# Patient Record
Sex: MALE | Race: WHITE | NOT HISPANIC OR LATINO | Employment: FULL TIME | URBAN - METROPOLITAN AREA
[De-identification: names, ages, dates, MRNs, and addresses within clinical notes are randomized per-mention and may not be internally consistent; named-entity substitution may affect disease eponyms.]

---

## 2017-01-01 ENCOUNTER — HOSPITAL ENCOUNTER (EMERGENCY)
Facility: HOSPITAL | Age: 57
Discharge: HOME | End: 2017-01-01
Attending: EMERGENCY MEDICINE
Payer: COMMERCIAL

## 2017-01-01 ENCOUNTER — GENERIC CONVERSION - ENCOUNTER (OUTPATIENT)
Dept: OTHER | Facility: OTHER | Age: 57
End: 2017-01-01

## 2017-01-01 VITALS
WEIGHT: 290 LBS | OXYGEN SATURATION: 98 % | HEIGHT: 72 IN | RESPIRATION RATE: 18 BRPM | BODY MASS INDEX: 39.28 KG/M2 | TEMPERATURE: 96.4 F | HEART RATE: 99 BPM | SYSTOLIC BLOOD PRESSURE: 140 MMHG | DIASTOLIC BLOOD PRESSURE: 71 MMHG

## 2017-01-01 DIAGNOSIS — R33.8 ACUTE URINARY RETENTION: Primary | ICD-10-CM

## 2017-01-01 LAB
AMORPH URATE CRY URNS QL MICRO: ABNORMAL /HPF
ANION GAP SERPL CALCULATED.3IONS-SCNC: 10 MMOL/L (ref 4–13)
BACTERIA UR QL AUTO: ABNORMAL /HPF
BILIRUB UR QL STRIP: NEGATIVE
BUN SERPL-MCNC: 22 MG/DL (ref 5–25)
CALCIUM SERPL-MCNC: 9.3 MG/DL (ref 8.3–10.1)
CHLORIDE SERPL-SCNC: 105 MMOL/L (ref 100–108)
CLARITY UR: CLEAR
CLARITY, POC: CLEAR
CO2 SERPL-SCNC: 24 MMOL/L (ref 21–32)
COLOR UR: YELLOW
COLOR, POC: YELLOW
CREAT SERPL-MCNC: 0.96 MG/DL (ref 0.6–1.3)
GFR SERPL CREATININE-BSD FRML MDRD: >60 ML/MIN/1.73SQ M
GLUCOSE SERPL-MCNC: 140 MG/DL (ref 65–140)
GLUCOSE UR STRIP-MCNC: NEGATIVE MG/DL
HGB UR QL STRIP.AUTO: ABNORMAL
KETONES UR STRIP-MCNC: NEGATIVE MG/DL
LEUKOCYTE ESTERASE UR QL STRIP: NEGATIVE
NITRITE UR QL STRIP: NEGATIVE
NON-SQ EPI CELLS URNS QL MICRO: ABNORMAL /HPF
PH UR STRIP.AUTO: 5.5 [PH] (ref 4.5–8)
POTASSIUM SERPL-SCNC: 3.8 MMOL/L (ref 3.5–5.3)
PROT UR STRIP-MCNC: NEGATIVE MG/DL
RBC #/AREA URNS AUTO: ABNORMAL /HPF
SODIUM SERPL-SCNC: 139 MMOL/L (ref 136–145)
SP GR UR STRIP.AUTO: 1.01 (ref 1–1.03)
UROBILINOGEN UR QL STRIP.AUTO: 0.2 E.U./DL
WBC #/AREA URNS AUTO: ABNORMAL /HPF

## 2017-01-01 PROCEDURE — 87086 URINE CULTURE/COLONY COUNT: CPT

## 2017-01-01 PROCEDURE — 99284 EMERGENCY DEPT VISIT MOD MDM: CPT

## 2017-01-01 PROCEDURE — 81002 URINALYSIS NONAUTO W/O SCOPE: CPT | Performed by: EMERGENCY MEDICINE

## 2017-01-01 PROCEDURE — 80048 BASIC METABOLIC PNL TOTAL CA: CPT | Performed by: EMERGENCY MEDICINE

## 2017-01-01 PROCEDURE — 81001 URINALYSIS AUTO W/SCOPE: CPT

## 2017-01-01 PROCEDURE — 36415 COLL VENOUS BLD VENIPUNCTURE: CPT | Performed by: EMERGENCY MEDICINE

## 2017-01-01 RX ORDER — LISINOPRIL 40 MG/1
40 TABLET ORAL DAILY
COMMUNITY

## 2017-01-01 RX ORDER — ALBUTEROL SULFATE 90 UG/1
2 AEROSOL, METERED RESPIRATORY (INHALATION) EVERY 6 HOURS PRN
COMMUNITY

## 2017-01-01 RX ORDER — AMOXICILLIN 500 MG
1 CAPSULE ORAL 2 TIMES DAILY
COMMUNITY

## 2017-01-01 RX ORDER — DIPHENOXYLATE HYDROCHLORIDE AND ATROPINE SULFATE 2.5; .025 MG/1; MG/1
1 TABLET ORAL DAILY
COMMUNITY

## 2017-01-01 RX ORDER — MELOXICAM 7.5 MG/1
7.5 TABLET ORAL DAILY
COMMUNITY
End: 2021-03-22

## 2017-01-01 RX ORDER — RANITIDINE 150 MG/1
150 TABLET ORAL 2 TIMES DAILY
COMMUNITY

## 2017-01-01 RX ORDER — GABAPENTIN 300 MG/1
300 CAPSULE ORAL 2 TIMES DAILY
COMMUNITY

## 2017-01-01 RX ORDER — LIDOCAINE HYDROCHLORIDE 20 MG/ML
JELLY TOPICAL ONCE
Status: COMPLETED | OUTPATIENT
Start: 2017-01-01 | End: 2017-01-01

## 2017-01-01 RX ORDER — TRAMADOL HYDROCHLORIDE 50 MG/1
50 TABLET ORAL EVERY 6 HOURS PRN
COMMUNITY
End: 2021-03-22

## 2017-01-01 RX ADMIN — LIDOCAINE HYDROCHLORIDE: 20 JELLY TOPICAL at 17:33

## 2017-01-02 LAB — BACTERIA UR CULT: NORMAL

## 2017-01-09 ENCOUNTER — ALLSCRIPTS OFFICE VISIT (OUTPATIENT)
Dept: OTHER | Facility: OTHER | Age: 57
End: 2017-01-09

## 2017-01-09 ENCOUNTER — TRANSCRIBE ORDERS (OUTPATIENT)
Dept: ADMINISTRATIVE | Facility: HOSPITAL | Age: 57
End: 2017-01-09

## 2017-01-09 DIAGNOSIS — R31.0 GROSS HEMATURIA: Primary | ICD-10-CM

## 2017-01-09 DIAGNOSIS — R97.20 ELEVATED PROSTATE SPECIFIC ANTIGEN (PSA): ICD-10-CM

## 2017-01-09 DIAGNOSIS — R31.0 GROSS HEMATURIA: ICD-10-CM

## 2017-01-16 ENCOUNTER — HOSPITAL ENCOUNTER (OUTPATIENT)
Dept: CT IMAGING | Facility: HOSPITAL | Age: 57
Discharge: HOME | End: 2017-01-16
Attending: UROLOGY
Payer: COMMERCIAL

## 2017-01-16 DIAGNOSIS — R97.20 ELEVATED PROSTATE SPECIFIC ANTIGEN (PSA): ICD-10-CM

## 2017-01-16 DIAGNOSIS — R31.0 GROSS HEMATURIA: ICD-10-CM

## 2017-01-16 PROCEDURE — 74178 CT ABD&PLV WO CNTR FLWD CNTR: CPT

## 2017-01-16 RX ADMIN — IOHEXOL 100 ML: 350 INJECTION, SOLUTION INTRAVENOUS at 16:00

## 2017-02-20 ENCOUNTER — ALLSCRIPTS OFFICE VISIT (OUTPATIENT)
Dept: OTHER | Facility: OTHER | Age: 57
End: 2017-02-20

## 2017-03-10 ENCOUNTER — ALLSCRIPTS OFFICE VISIT (OUTPATIENT)
Dept: OTHER | Facility: OTHER | Age: 57
End: 2017-03-10

## 2017-09-07 DIAGNOSIS — R97.20 ELEVATED PROSTATE SPECIFIC ANTIGEN (PSA): ICD-10-CM

## 2018-01-12 NOTE — PROCEDURES
Procedures  by Lorena Mcdowell PA-C at 1/1/2017  5:46 PM      Author: W Romayne Terryl Plumber, PA-C Service:  Urology Author Type:  Physician Assistant    Filed:  1/1/2017  5:49 PM Date of Service:  1/1/2017  5:46 PM Status:  Attested    : Lorena Mcdowell PA-C (Physician Assistant)  Cosigner:  Becky Fry MD at 1/2/2017  9:33 AM      Pre-procedure Diagnoses:       1  Urinary retention [R33 9]                Procedures:       1  BLADDER CATHETERIZATION [LSV278 (Custom)]              Attestation signed by Becky Fry MD at 1/2/2017  9:33 AM           I agree with the physician assistant as above  I received a call on the evening of January 1, 2017 from the emergency room at [facility]  The physician  stated that Mr Jaden Barba was in urinary retention and multiple attempts by the emergency room staff and physician were unsuccessful to place a Arriola catheter  I was informed that there was a urologist on call for the hospital but that he was unavailable  Therefore the emergency room at [facility] requested transfer to [facility] for urologic assistance with Arriola catheter placement  The on-call surgical physician assistant was able to easily place a Arriola catheter without difficulty  or resistance  The patient was discharged from the emergency room with an indwelling Arriola catheter in place will follow-up in the outpatient setting for a voiding trial                                            Pt transferred to ER from [facility]  Pt unable to urinate since 2 am  Doctors in ER there unable to place arriola  Using standard sterile technique and urojet lidocaine, placed 16F  coudet catheter without resistance  Urine returned, balloon inflated with 10 ml of sterile water  Pt tolerated procedure well  800 ml of urine returned with more still draining             Received [address]  Jan 2 2017 9:34AM Eastern Standard Time

## 2018-01-13 VITALS
WEIGHT: 292 LBS | RESPIRATION RATE: 16 BRPM | HEART RATE: 92 BPM | SYSTOLIC BLOOD PRESSURE: 150 MMHG | BODY MASS INDEX: 38.7 KG/M2 | DIASTOLIC BLOOD PRESSURE: 90 MMHG | HEIGHT: 73 IN

## 2018-01-14 VITALS
SYSTOLIC BLOOD PRESSURE: 130 MMHG | DIASTOLIC BLOOD PRESSURE: 80 MMHG | RESPIRATION RATE: 16 BRPM | BODY MASS INDEX: 40.16 KG/M2 | WEIGHT: 303 LBS | HEIGHT: 73 IN | HEART RATE: 100 BPM

## 2018-01-22 VITALS
BODY MASS INDEX: 40.64 KG/M2 | HEART RATE: 80 BPM | DIASTOLIC BLOOD PRESSURE: 82 MMHG | SYSTOLIC BLOOD PRESSURE: 140 MMHG | WEIGHT: 308 LBS

## 2018-02-26 ENCOUNTER — OFFICE VISIT (OUTPATIENT)
Dept: URGENT CARE | Facility: CLINIC | Age: 58
End: 2018-02-26
Payer: COMMERCIAL

## 2018-02-26 VITALS
DIASTOLIC BLOOD PRESSURE: 81 MMHG | OXYGEN SATURATION: 96 % | SYSTOLIC BLOOD PRESSURE: 165 MMHG | HEART RATE: 108 BPM | TEMPERATURE: 100.6 F

## 2018-02-26 DIAGNOSIS — J11.1 INFLUENZA: Primary | ICD-10-CM

## 2018-02-26 PROCEDURE — G0382 LEV 3 HOSP TYPE B ED VISIT: HCPCS | Performed by: NURSE PRACTITIONER

## 2018-02-26 RX ORDER — OSELTAMIVIR PHOSPHATE 75 MG/1
75 CAPSULE ORAL 2 TIMES DAILY
Qty: 10 CAPSULE | Refills: 0 | Status: SHIPPED | OUTPATIENT
Start: 2018-02-26 | End: 2018-03-03

## 2018-02-26 RX ORDER — FINASTERIDE 5 MG/1
5 TABLET, FILM COATED ORAL
COMMUNITY
End: 2018-04-09 | Stop reason: SDUPTHER

## 2018-02-26 RX ORDER — CYCLOBENZAPRINE HCL 10 MG
10 TABLET ORAL
COMMUNITY
End: 2021-03-22

## 2018-02-26 RX ORDER — TAMSULOSIN HYDROCHLORIDE 0.4 MG/1
0.4 CAPSULE ORAL
COMMUNITY
End: 2018-04-09 | Stop reason: SDUPTHER

## 2018-02-26 RX ORDER — BUDESONIDE AND FORMOTEROL FUMARATE DIHYDRATE 160; 4.5 UG/1; UG/1
2 AEROSOL RESPIRATORY (INHALATION)
COMMUNITY

## 2018-02-26 NOTE — PATIENT INSTRUCTIONS
Increase fluids and rest  OTC cough/cold medications  Discussed viral vs bacterial infection  Return if symptoms worsen or for problems/concerns  Tamiflu as directed

## 2018-02-26 NOTE — PROGRESS NOTES
[facility]        NAME: Bj Le is a 62 y o  male  : 1960    MRN: [id number]  DATE: 2018  TIME: 11:12 AM    Assessment and Plan   Influenza [J11 1]  1  Influenza  oseltamivir (TAMIFLU) 75 mg capsule         Patient Instructions     Patient Instructions   Increase fluids and rest  OTC cough/cold medications  Discussed viral vs bacterial infection  Return if symptoms worsen or for problems/concerns  Tamiflu as directed      Follow up with PCP in 3-5 days  Proceed to  ER if symptoms worsen  Chief Complaint     Chief Complaint   Patient presents with    Cold Like Symptoms     cough and achy since  with chills         History of Present Illness       Started with a scratchy throat on Saturday, last night started with bodyaches, fever, headache, sinus cognestion, and cough  Review of Systems   Review of Systems   Constitutional: Positive for activity change, chills, fatigue and fever  HENT: Positive for postnasal drip, rhinorrhea and sore throat  Negative for congestion, ear pain and sinus pressure  Eyes: Negative for pain, discharge and redness  Respiratory: Positive for cough  Negative for wheezing  Cardiovascular: Negative for chest pain  Gastrointestinal: Negative for constipation, diarrhea, nausea and vomiting  Musculoskeletal: Positive for myalgias  Skin: Negative for rash  Neurological: Positive for headaches  Negative for dizziness           Current Medications       Current Outpatient Prescriptions:     apixaban (ELIQUIS) 5 mg, 2 tablets twice a day until , then on  change to 1 tablet twice a day , Disp: , Rfl:     tamsulosin (FLOMAX) 0 4 mg, Take 0 4 mg by mouth daily with dinner, Disp: , Rfl:     albuterol (PROVENTIL HFA,VENTOLIN HFA) 90 mcg/act inhaler, Inhale 2 puffs every 6 (six) hours as needed for wheezing, Disp: , Rfl:     budesonide-formoterol (SYMBICORT) 160-4 5 mcg/act inhaler, Inhale 2 puffs, Disp: , Rfl:    Capsaicin-Menthol-Methyl Anil (Glo Browne) 0 025-10-30 % LOTN, Apply topically, Disp: , Rfl:     Cholecalciferol (VITAMIN D3 PO), Take 1 tablet by mouth daily, Disp: , Rfl:     cyclobenzaprine (FLEXERIL) 10 mg tablet, Take 10 mg by mouth, Disp: , Rfl:     finasteride (PROSCAR) 5 mg tablet, Take 5 mg by mouth, Disp: , Rfl:     gabapentin (NEURONTIN) 300 mg capsule, Take 300 mg by mouth 2 (two) times a day, Disp: , Rfl:     lisinopril (ZESTRIL) 40 mg tablet, Take 40 mg by mouth daily, Disp: , Rfl:     meloxicam (MOBIC) 7 5 mg tablet, Take 7 5 mg by mouth daily, Disp: , Rfl:     multivitamin (THERAGRAN) TABS, Take 1 tablet by mouth daily, Disp: , Rfl:     Omega-3 Fatty Acids (FISH OIL) 1200 MG CAPS, Take 1 capsule by mouth 2 (two) times a day, Disp: , Rfl:     oseltamivir (TAMIFLU) 75 mg capsule, Take 1 capsule (75 mg total) by mouth 2 (two) times a day for 5 days, Disp: 10 capsule, Rfl: 0    ranitidine (ZANTAC) 150 mg tablet, Take 150 mg by mouth 2 (two) times a day, Disp: , Rfl:     Red Yeast Rice Extract (RED YEAST RICE PO), Take by mouth, Disp: , Rfl:     traMADol (ULTRAM) 50 mg tablet, Take 50 mg by mouth every 6 (six) hours as needed for moderate pain, Disp: , Rfl:     Zolpidem Tartrate (AMBIEN PO), Take 10 mg by mouth daily at bedtime, Disp: , Rfl:     Current Allergies     Allergies as of 02/26/2018 - Reviewed 02/26/2018   Allergen Reaction Noted    Percocet [oxycodone-acetaminophen] Itching 01/01/2017            The following portions of the patient's history were reviewed and updated as appropriate: allergies, current medications, past family history, past medical history, past social history, past surgical history and problem list      Past Medical History:   Diagnosis Date    Asthma     Enlarged prostate     Hypertension     Lumbar herniated disc        Past Surgical History:   Procedure Laterality Date    LUNG BIOPSY      PROSTATE BIOPSY         No family history on file       Medications have been verified  Objective   /81   Pulse (!) 108   Temp (!) 100 6 °F (38 1 °C)   SpO2 96%        Physical Exam     Physical Exam   Constitutional: He is oriented to person, place, and time  He appears well-developed and well-nourished  He has a sickly appearance  No distress  HENT:   Head: Normocephalic and atraumatic  Right Ear: Tympanic membrane, external ear and ear canal normal    Left Ear: Tympanic membrane, external ear and ear canal normal    Nose: Rhinorrhea present  No epistaxis  Mouth/Throat: Uvula is midline, oropharynx is clear and moist and mucous membranes are normal    Cardiovascular: Regular rhythm and normal heart sounds  Tachycardia present  Exam reveals no gallop and no friction rub  No murmur heard  Pulmonary/Chest: Effort normal and breath sounds normal  No respiratory distress  He has no wheezes  He has no rales  Abdominal: He exhibits no distension  There is no tenderness  Lymphadenopathy:        Head (right side): No submandibular adenopathy present  Head (left side): No submandibular adenopathy present  Neurological: He is alert and oriented to person, place, and time  Skin: He is not diaphoretic  Psychiatric: He has a normal mood and affect  His behavior is normal  Judgment and thought content normal    Nursing note and vitals reviewed

## 2018-04-09 DIAGNOSIS — N40.1 BENIGN PROSTATIC HYPERPLASIA WITH LOWER URINARY TRACT SYMPTOMS, SYMPTOM DETAILS UNSPECIFIED: Primary | ICD-10-CM

## 2018-04-09 RX ORDER — TAMSULOSIN HYDROCHLORIDE 0.4 MG/1
0.4 CAPSULE ORAL
Qty: 30 CAPSULE | Refills: 1 | Status: SHIPPED | OUTPATIENT
Start: 2018-04-09 | End: 2018-07-04 | Stop reason: SDUPTHER

## 2018-04-09 RX ORDER — FINASTERIDE 5 MG/1
5 TABLET, FILM COATED ORAL DAILY
Qty: 30 TABLET | Refills: 1 | Status: SHIPPED | OUTPATIENT
Start: 2018-04-09 | End: 2018-07-08 | Stop reason: SDUPTHER

## 2018-07-04 DIAGNOSIS — N40.1 BENIGN PROSTATIC HYPERPLASIA WITH LOWER URINARY TRACT SYMPTOMS, SYMPTOM DETAILS UNSPECIFIED: ICD-10-CM

## 2018-07-05 RX ORDER — TAMSULOSIN HYDROCHLORIDE 0.4 MG/1
CAPSULE ORAL
Qty: 30 CAPSULE | Refills: 1 | Status: SHIPPED | OUTPATIENT
Start: 2018-07-05 | End: 2018-09-01 | Stop reason: SDUPTHER

## 2018-07-08 DIAGNOSIS — N40.1 BENIGN PROSTATIC HYPERPLASIA WITH LOWER URINARY TRACT SYMPTOMS, SYMPTOM DETAILS UNSPECIFIED: ICD-10-CM

## 2018-07-09 RX ORDER — FINASTERIDE 5 MG/1
TABLET, FILM COATED ORAL
Qty: 30 TABLET | Refills: 1 | Status: SHIPPED | OUTPATIENT
Start: 2018-07-09 | End: 2018-09-06 | Stop reason: SDUPTHER

## 2018-09-01 DIAGNOSIS — N40.1 BENIGN PROSTATIC HYPERPLASIA WITH LOWER URINARY TRACT SYMPTOMS, SYMPTOM DETAILS UNSPECIFIED: ICD-10-CM

## 2018-09-04 RX ORDER — TAMSULOSIN HYDROCHLORIDE 0.4 MG/1
CAPSULE ORAL
Qty: 30 CAPSULE | Refills: 1 | Status: SHIPPED | OUTPATIENT
Start: 2018-09-04 | End: 2018-10-16 | Stop reason: SDUPTHER

## 2018-09-06 DIAGNOSIS — N40.1 BENIGN PROSTATIC HYPERPLASIA WITH LOWER URINARY TRACT SYMPTOMS, SYMPTOM DETAILS UNSPECIFIED: ICD-10-CM

## 2018-09-06 RX ORDER — FINASTERIDE 5 MG/1
TABLET, FILM COATED ORAL
Qty: 30 TABLET | Refills: 1 | OUTPATIENT
Start: 2018-09-06

## 2018-09-06 RX ORDER — FINASTERIDE 5 MG/1
5 TABLET, FILM COATED ORAL DAILY
Qty: 30 TABLET | Refills: 0 | Status: SHIPPED | OUTPATIENT
Start: 2018-09-06 | End: 2018-10-16 | Stop reason: SDUPTHER

## 2018-09-21 LAB — HBA1C MFR BLD HPLC: 5.6 %

## 2018-10-16 ENCOUNTER — OFFICE VISIT (OUTPATIENT)
Dept: UROLOGY | Facility: CLINIC | Age: 58
End: 2018-10-16
Payer: COMMERCIAL

## 2018-10-16 VITALS
BODY MASS INDEX: 41.3 KG/M2 | WEIGHT: 313 LBS | DIASTOLIC BLOOD PRESSURE: 80 MMHG | HEART RATE: 68 BPM | SYSTOLIC BLOOD PRESSURE: 120 MMHG

## 2018-10-16 DIAGNOSIS — N40.1 BENIGN PROSTATIC HYPERPLASIA WITH LOWER URINARY TRACT SYMPTOMS, SYMPTOM DETAILS UNSPECIFIED: ICD-10-CM

## 2018-10-16 DIAGNOSIS — N40.0 BENIGN PROSTATIC HYPERPLASIA WITHOUT LOWER URINARY TRACT SYMPTOMS: Primary | ICD-10-CM

## 2018-10-16 DIAGNOSIS — R97.20 ELEVATED PSA: ICD-10-CM

## 2018-10-16 LAB — POST-VOID RESIDUAL VOLUME, ML POC: 22 ML

## 2018-10-16 PROCEDURE — 99213 OFFICE O/P EST LOW 20 MIN: CPT | Performed by: UROLOGY

## 2018-10-16 PROCEDURE — 51798 US URINE CAPACITY MEASURE: CPT | Performed by: UROLOGY

## 2018-10-16 RX ORDER — FINASTERIDE 5 MG/1
5 TABLET, FILM COATED ORAL DAILY
Qty: 30 TABLET | Refills: 11 | Status: SHIPPED | OUTPATIENT
Start: 2018-10-16 | End: 2019-12-03 | Stop reason: SDUPTHER

## 2018-10-16 RX ORDER — TAMSULOSIN HYDROCHLORIDE 0.4 MG/1
0.4 CAPSULE ORAL 2 TIMES DAILY
Qty: 60 CAPSULE | Refills: 11 | Status: SHIPPED | OUTPATIENT
Start: 2018-10-16 | End: 2019-10-23 | Stop reason: SDUPTHER

## 2018-10-16 NOTE — PROGRESS NOTES
UROLOGY ROUTINE FOLLOW UP NOTE     CHIEF COMPLAINT   Munira Mackey is a 62 y o  male with a complaint of   Chief Complaint   Patient presents with    Benign Prostatic Hypertrophy     PSA 19 3       History of Present Illness:     62 y o  male with a history of enlarged prostate and elevated PSA  Previously seen by my partner, Dr Felipe Duenas  Last seen in 2011  Patient was on Proscar for lower urinary tract symptoms  He had previously undergone a prostate biopsy in 2009 which was negative for cancer      Patient denies prostate cancer history in his family  His brother has undergone a transrectal resection of prostate for obstructive urinary symptoms      He presented again in 2017 with acute urinary retention  He was transferred from an outside hospital due to difficult placing a Fowler catheter the emergency room  Catheter was able to be placed at [address]  800 mL plus was returned  The patient was started on Flomax      After evaluation for his gross hematuria, patient was also started on Proscar  Hematuria workup was undertaken  The patient did undergo a cystoscopy and a transrectal biopsy the prostate in February 2017      He was noted to have a markedly enlarged prostate at 138 g  He did well after the prostate biopsy and had minimal issues or side effects  Patient is maintained on Proscar and Flomax  Due for updated visit and evaluation    Patient is very happy on the medication as it seems to control symptoms well  He is somewhat concerned about his rising PSA      AUA SS 12 QoL 1    Past Medical History:     Past Medical History:   Diagnosis Date    Asthma     Enlarged prostate     Hypertension     Lumbar herniated disc        PAST SURGICAL HISTORY:     Past Surgical History:   Procedure Laterality Date    LUNG BIOPSY      PROSTATE BIOPSY         CURRENT MEDICATIONS:     Current Outpatient Prescriptions   Medication Sig Dispense Refill    albuterol (PROVENTIL HFA,VENTOLIN HFA) 90 mcg/act inhaler Inhale 2 puffs every 6 (six) hours as needed for wheezing      apixaban (ELIQUIS) 5 mg 2 tablets twice a day until June 8, then on June 9 change to 1 tablet twice a day   budesonide-formoterol (SYMBICORT) 160-4 5 mcg/act inhaler Inhale 2 puffs      Cholecalciferol (VITAMIN D3 PO) Take 1 tablet by mouth daily      cyclobenzaprine (FLEXERIL) 10 mg tablet Take 10 mg by mouth      finasteride (PROSCAR) 5 mg tablet Take 1 tablet (5 mg total) by mouth daily 30 tablet 0    gabapentin (NEURONTIN) 300 mg capsule Take 300 mg by mouth 2 (two) times a day      lisinopril (ZESTRIL) 40 mg tablet Take 40 mg by mouth daily      multivitamin (THERAGRAN) TABS Take 1 tablet by mouth daily      Omega-3 Fatty Acids (FISH OIL) 1200 MG CAPS Take 1 capsule by mouth 2 (two) times a day      ranitidine (ZANTAC) 150 mg tablet Take 150 mg by mouth 2 (two) times a day      tamsulosin (FLOMAX) 0 4 mg take 1 capsule by mouth once daily WITH DINNER 30 capsule 1    traMADol (ULTRAM) 50 mg tablet Take 50 mg by mouth every 6 (six) hours as needed for moderate pain      Zolpidem Tartrate (AMBIEN PO) Take 10 mg by mouth daily at bedtime      Capsaicin-Menthol-Methyl Anil (DENDRACIN NEURODENDRAXCIN) 0 025-10-30 % LOTN Apply topically      meloxicam (MOBIC) 7 5 mg tablet Take 7 5 mg by mouth daily      Red Yeast Rice Extract (RED YEAST RICE PO) Take by mouth       No current facility-administered medications for this visit          ALLERGIES:     Allergies   Allergen Reactions    Percocet [Oxycodone-Acetaminophen] Itching       SOCIAL HISTORY:     Social History     Social History    Marital status: /Civil Union     Spouse name: N/A    Number of children: N/A    Years of education: N/A     Occupational History    laboror      Social History Main Topics    Smoking status: Never Smoker    Smokeless tobacco: Never Used    Alcohol use No    Drug use: No    Sexual activity: Not Asked     Other Topics Concern    None Social History Narrative    None       SOCIAL HISTORY:   History reviewed  No pertinent family history  REVIEW OF SYSTEMS:     Review of Systems   Constitutional: Negative  Respiratory: Negative  Cardiovascular: Negative  Genitourinary: Positive for frequency and urgency  Musculoskeletal: Negative  Neurological: Negative  Psychiatric/Behavioral: Negative  PHYSICAL EXAM:     /80   Pulse 68   Wt (!) 142 kg (313 lb)   BMI 41 30 kg/m²     General:  Healthy appearing male in no acute distress  They have a normal affect  There is not appear to be any gross neurologic defects or abnormalities  HEENT:  Normocephalic, atraumatic  Neck is supple without any palpable lymphadenopathy  Cardiovascular:  Patient has normal palpable distal radial pulses  There is no significant peripheral edema  No JVD is noted  Respiratory:  Patient has unlabored respirations  There is no audible wheeze or rhonchi  Abdomen:  Abdomen is obese without surgical scars  Abdomen is soft and nontender  There is no tympany  Inguinal and umbilical hernia are not appreciated  Musculoskeletal:  Patient does not have significant CVA tenderness in the  flank with palpation or percussion  They full range of motion in all 4 extremities  Strength in all 4 extremities appears congruent  Patient is able to ambulate without assistance or difficulty  Dermatologic:  Patient has no skin abnormalities or rashes        LABS:     CBC: No results found for: WBC, HGB, HCT, MCV, PLT    BMP:   Lab Results   Component Value Date    CALCIUM 9 3 2017     2017    K 3 8 2017    CO2 24 2017     2017    BUN 22 2017    CREATININE 0 96 2017     No results found for: PSA  PSA from May 17, 2016 demonstrates a total PSA of 16 8   PSA from 18 19 3    IMAGIN/16/17  CT ABDOMEN AND PELVIS WITH AND WITHOUT IV CONTRAST     INDICATION:  Urinary retention      COMPARISON: None      TECHNIQUE:  CT examination of the abdomen and pelvis  Precontrast imaging through the upper abdomen was performed  In addition to portal venous phase postcontrast scanning through the abdomen and pelvis, delayed phase postcontrast scanning was   performed through the abdomen from pelvis for performance of a urogram   Axial, sagittal and coronal reformatted projections were created  This examination, like all CT scans performed in the [facility], was performed utilizing   techniques to minimize radiation dose exposure, including the use of iterative reconstruction and automated exposure control       IV Contrast:  iohexol (OMNIPAQUE) 350 MG/ML injection (MULTI-DOSE) 100 mL  Note: (SINGLE DOSE/MULTI DOSE) information refers to the container from which the contrast was acquired  Contrast was injected one time intravenously without immediate   complication      Enteric Contrast:  Enteric contrast was not administered  FINDINGS:     ABDOMEN     LOWER CHEST:  No significant abnormalities identified in the lower chest      LIVER/BILIARY TREE:  Unremarkable      GALLBLADDER:  No calcified gallstones  No pericholecystic inflammatory change      SPLEEN:  Unremarkable      PANCREAS:  Unremarkable      ADRENAL GLANDS:  Unremarkable      KIDNEYS/URETERS:  One or more sharply circumscribed subcentimeter renal hypodensities are noted  These lesions are too small to accurately characterize, but are statistically most likely to represent benign cortical renal cyst(s)  According to the   guidelines published in the [facility] Paper of the ACR Incidental Findings Committee (Radiology 2010), no further workup of these lesions is recommended    No hydronephrosis, perinephric collections, or suspicious enhancing solid mass lesions      STOMACH AND BOWEL:  Unremarkable      APPENDIX:  A normal appendix was visualized      ABDOMINOPELVIC CAVITY:  No ascites or free intraperitoneal air  No lymphadenopathy      VESSELS:  Unremarkable for patient's age      PELVIS     REPRODUCTIVE ORGANS:  The prostate is enlarged      URINARY BLADDER:  Mild bladder wall thickening likely sequela of outlet obstruction with no enhancing mass or pericystic inflammation      ABDOMINAL WALL/INGUINAL REGIONS:  Unremarkable      OSSEOUS STRUCTURES:  No acute fracture or destructive osseous lesion      IMPRESSION:        1  Prostatomegaly with mild bladder wall thickening concerning for obstruction  2   No hydronephrosis or perinephric collections  PATHOLOGY:     2/2017  Prostate pathology from biopsy - negative    12/2009      PROCEDURE:     Recent Results (from the past 2 hour(s))   POCT Measure PVR    Collection Time: 10/16/18  1:44 PM   Result Value Ref Range    POST-VOID RESIDUAL VOLUME, ML POC 22 mL     ASSESSMENT:     62 y o  male with enlarged prostate, >130g, and moderate LUTs on dual therapy    PLAN:     Patient has a very large prostate and this is likely the reason for elevated PSA  However the PSA has risen over the last year and his PSA density has gone up given prior prostate measurements  I did discuss the patient multiparametric MRI of the prostate given his young age  He is interested in proceeding  I refilled his Proscar and Flomax is the seem to be controlling his symptoms very well  I will see the patient back in 3 months unless there is an abnormality on the MR I  If the MRI returns completely normal, we can certainly stretch out the patient's follow-up

## 2018-12-04 ENCOUNTER — HOSPITAL ENCOUNTER (OUTPATIENT)
Dept: RADIOLOGY | Facility: HOSPITAL | Age: 58
Discharge: HOME | End: 2018-12-04
Attending: UROLOGY
Payer: COMMERCIAL

## 2018-12-04 DIAGNOSIS — R97.20 ELEVATED PSA: ICD-10-CM

## 2018-12-04 PROCEDURE — 72197 MRI PELVIS W/O & W/DYE: CPT

## 2018-12-04 PROCEDURE — 76377 3D RENDER W/INTRP POSTPROCES: CPT

## 2018-12-04 PROCEDURE — A9585 GADOBUTROL INJECTION: HCPCS | Performed by: UROLOGY

## 2018-12-04 RX ADMIN — GADOBUTROL 17 ML: 604.72 INJECTION INTRAVENOUS at 09:32

## 2019-03-25 ENCOUNTER — OFFICE VISIT (OUTPATIENT)
Dept: UROLOGY | Facility: CLINIC | Age: 59
End: 2019-03-25
Payer: COMMERCIAL

## 2019-03-25 VITALS
WEIGHT: 315 LBS | HEIGHT: 73 IN | SYSTOLIC BLOOD PRESSURE: 150 MMHG | DIASTOLIC BLOOD PRESSURE: 90 MMHG | BODY MASS INDEX: 41.75 KG/M2 | HEART RATE: 80 BPM

## 2019-03-25 DIAGNOSIS — N40.0 BENIGN PROSTATIC HYPERPLASIA WITHOUT LOWER URINARY TRACT SYMPTOMS: ICD-10-CM

## 2019-03-25 DIAGNOSIS — R97.20 ELEVATED PSA: Primary | ICD-10-CM

## 2019-03-25 PROCEDURE — 99213 OFFICE O/P EST LOW 20 MIN: CPT | Performed by: UROLOGY

## 2019-10-23 DIAGNOSIS — N40.1 BENIGN PROSTATIC HYPERPLASIA WITH LOWER URINARY TRACT SYMPTOMS, SYMPTOM DETAILS UNSPECIFIED: ICD-10-CM

## 2019-10-23 RX ORDER — TAMSULOSIN HYDROCHLORIDE 0.4 MG/1
CAPSULE ORAL
Qty: 60 CAPSULE | Refills: 11 | Status: SHIPPED | OUTPATIENT
Start: 2019-10-23 | End: 2020-11-03 | Stop reason: SDUPTHER

## 2019-12-03 DIAGNOSIS — N40.1 BENIGN PROSTATIC HYPERPLASIA WITH LOWER URINARY TRACT SYMPTOMS, SYMPTOM DETAILS UNSPECIFIED: ICD-10-CM

## 2019-12-04 RX ORDER — FINASTERIDE 5 MG/1
TABLET, FILM COATED ORAL
Qty: 30 TABLET | Refills: 11 | Status: SHIPPED | OUTPATIENT
Start: 2019-12-04 | End: 2021-01-17 | Stop reason: SDUPTHER

## 2020-01-08 ENCOUNTER — APPOINTMENT (EMERGENCY)
Dept: CT IMAGING | Facility: HOSPITAL | Age: 60
End: 2020-01-08
Payer: COMMERCIAL

## 2020-01-08 ENCOUNTER — HOSPITAL ENCOUNTER (EMERGENCY)
Facility: HOSPITAL | Age: 60
Discharge: HOME | End: 2020-01-08
Attending: INTERNAL MEDICINE
Payer: COMMERCIAL

## 2020-01-08 VITALS
DIASTOLIC BLOOD PRESSURE: 97 MMHG | HEIGHT: 72 IN | SYSTOLIC BLOOD PRESSURE: 177 MMHG | OXYGEN SATURATION: 98 % | WEIGHT: 300 LBS | TEMPERATURE: 98.6 F | RESPIRATION RATE: 16 BRPM | HEART RATE: 98 BPM | BODY MASS INDEX: 40.63 KG/M2

## 2020-01-08 DIAGNOSIS — S06.0X0A CONCUSSION WITHOUT LOSS OF CONSCIOUSNESS, INITIAL ENCOUNTER: Primary | ICD-10-CM

## 2020-01-08 PROCEDURE — 99284 EMERGENCY DEPT VISIT MOD MDM: CPT | Performed by: INTERNAL MEDICINE

## 2020-01-08 PROCEDURE — 99283 EMERGENCY DEPT VISIT LOW MDM: CPT

## 2020-01-08 PROCEDURE — 70450 CT HEAD/BRAIN W/O DYE: CPT

## 2020-01-08 NOTE — ED PROVIDER NOTES
History  Chief Complaint   Patient presents with    Head Injury     patient fell and hit back of head on the concrete  on blood thinners    Headache     61year-old simply taking the trash, slipped on the drive falling back and hitting back of his head  He is on Eliquis for extensive history of blood clots  Does not feel well, somewhat nauseous  Denies loss of consciousness  Just feels out of sorts  Prior to Admission Medications   Prescriptions Last Dose Informant Patient Reported? Taking? Capsaicin-Menthol-Methyl Anil (DENDRACIN NEURODENDRAXCIN) 0 025-10-30 % LOTN   Yes No   Sig: Apply topically   Cholecalciferol (VITAMIN D3 PO)   Yes No   Sig: Take 1 tablet by mouth daily   Omega-3 Fatty Acids (FISH OIL) 1200 MG CAPS   Yes No   Sig: Take 1 capsule by mouth 2 (two) times a day   Red Yeast Rice Extract (RED YEAST RICE PO)   Yes No   Sig: Take by mouth   Zolpidem Tartrate (AMBIEN PO)   Yes No   Sig: Take 10 mg by mouth daily at bedtime   albuterol (PROVENTIL HFA,VENTOLIN HFA) 90 mcg/act inhaler   Yes No   Sig: Inhale 2 puffs every 6 (six) hours as needed for wheezing   apixaban (ELIQUIS) 5 mg   Yes No   Si tablets twice a day until , then on  change to 1 tablet twice a day     budesonide-formoterol (SYMBICORT) 160-4 5 mcg/act inhaler   Yes No   Sig: Inhale 2 puffs   cyclobenzaprine (FLEXERIL) 10 mg tablet   Yes No   Sig: Take 10 mg by mouth   finasteride (PROSCAR) 5 mg tablet   No No   Sig: take 1 tablet by mouth once daily   gabapentin (NEURONTIN) 300 mg capsule   Yes No   Sig: Take 300 mg by mouth 2 (two) times a day   lisinopril (ZESTRIL) 40 mg tablet   Yes No   Sig: Take 40 mg by mouth daily   meloxicam (MOBIC) 7 5 mg tablet   Yes No   Sig: Take 7 5 mg by mouth daily   multivitamin (THERAGRAN) TABS   Yes No   Sig: Take 1 tablet by mouth daily   ranitidine (ZANTAC) 150 mg tablet   Yes No   Sig: Take 150 mg by mouth 2 (two) times a day   tamsulosin (FLOMAX) 0 4 mg   No No   Sig: take 1 capsule by mouth twice a day   traMADol (ULTRAM) 50 mg tablet   Yes No   Sig: Take 50 mg by mouth every 6 (six) hours as needed for moderate pain      Facility-Administered Medications: None       Past Medical History:   Diagnosis Date    Asthma     Enlarged prostate     Hyperlipidemia     Hypertension     Lumbar herniated disc        Past Surgical History:   Procedure Laterality Date    LUNG BIOPSY      PROSTATE BIOPSY         History reviewed  No pertinent family history  I have reviewed and agree with the history as documented  Social History     Tobacco Use    Smoking status: Never Smoker    Smokeless tobacco: Never Used   Substance Use Topics    Alcohol use: No    Drug use: No        Review of Systems   Constitutional: Negative for chills and fever  HENT: Negative for rhinorrhea and sore throat  Eyes: Negative for visual disturbance  Respiratory: Negative for cough and shortness of breath  Cardiovascular: Negative for chest pain and leg swelling  Gastrointestinal: Positive for nausea  Negative for abdominal pain, diarrhea and vomiting  Genitourinary: Negative for dysuria  Musculoskeletal: Positive for arthralgias  Negative for back pain and myalgias  Skin: Negative for rash  Neurological: Negative for dizziness and headaches  Psychiatric/Behavioral: Negative for confusion  All other systems reviewed and are negative  Physical Exam  Physical Exam   Constitutional: He is oriented to person, place, and time  He appears well-developed and well-nourished  HENT:   Nose: Nose normal    Mouth/Throat: Oropharynx is clear and moist  No oropharyngeal exudate  Hematoma just above right occiput   Eyes: Pupils are equal, round, and reactive to light  Conjunctivae and EOM are normal  No scleral icterus  Neck: Normal range of motion  Neck supple  No JVD present  No tracheal deviation present  Cardiovascular: Normal rate, regular rhythm and normal heart sounds     No murmur heard   Pulmonary/Chest: Effort normal and breath sounds normal  No respiratory distress  He has no wheezes  He has no rales  Abdominal: Soft  Bowel sounds are normal  There is no tenderness  There is no guarding  Musculoskeletal: Normal range of motion  He exhibits no edema or tenderness  Trace edema lower extremities   Neurological: He is alert and oriented to person, place, and time  No cranial nerve deficit or sensory deficit  He exhibits normal muscle tone  5/5 motor, nl sens   Skin: Skin is warm and dry  There is pallor  Psychiatric: He has a normal mood and affect  His behavior is normal    Nursing note and vitals reviewed  Vital Signs  ED Triage Vitals [01/08/20 1613]   Temp Pulse Respirations Blood Pressure SpO2   -- 98 16 (!) 177/97 98 %      Temp src Heart Rate Source Patient Position - Orthostatic VS BP Location FiO2 (%)   -- Monitor Sitting Left arm --      Pain Score       4           Vitals:    01/08/20 1613   BP: (!) 177/97   Pulse: 98   Patient Position - Orthostatic VS: Sitting         Visual Acuity      ED Medications  Medications - No data to display    Diagnostic Studies  Results Reviewed     None                 No orders to display              Procedures  Procedures         ED Course  ED Course as of Jan 08 2113 Wed Jan 08, 2020   1729 Discussed CT scan findings and postconcussion syndrome  Patient will follow-up up with family doctor  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

## 2020-01-16 LAB
PSA FREE MFR SERPL: 21 % (CALC)
PSA FREE SERPL-MCNC: 1 NG/ML
PSA SERPL-MCNC: 4.7 NG/ML

## 2020-02-13 ENCOUNTER — OFFICE VISIT (OUTPATIENT)
Dept: UROLOGY | Facility: CLINIC | Age: 60
End: 2020-02-13
Payer: COMMERCIAL

## 2020-02-13 VITALS
HEART RATE: 81 BPM | HEIGHT: 72 IN | DIASTOLIC BLOOD PRESSURE: 84 MMHG | BODY MASS INDEX: 41.72 KG/M2 | SYSTOLIC BLOOD PRESSURE: 138 MMHG | WEIGHT: 308 LBS

## 2020-02-13 DIAGNOSIS — R31.0 GROSS HEMATURIA: ICD-10-CM

## 2020-02-13 DIAGNOSIS — N40.1 BENIGN PROSTATIC HYPERPLASIA WITH LOWER URINARY TRACT SYMPTOMS, SYMPTOM DETAILS UNSPECIFIED: Primary | ICD-10-CM

## 2020-02-13 DIAGNOSIS — R97.20 ELEVATED PSA: ICD-10-CM

## 2020-02-13 PROCEDURE — 99213 OFFICE O/P EST LOW 20 MIN: CPT | Performed by: UROLOGY

## 2020-02-13 RX ORDER — MONTELUKAST SODIUM 10 MG/1
TABLET ORAL
Refills: 0 | COMMUNITY
Start: 2019-11-27 | End: 2021-03-22

## 2020-02-13 NOTE — PROGRESS NOTES
UROLOGY ROUTINE FOLLOW UP NOTE     CHIEF COMPLAINT   Niel Brunner is a 61 y o  male with a complaint of   Chief Complaint   Patient presents with    Elevated PSA       History of Present Illness:     61 y o  male with a history of enlarged prostate and elevated PSA  Previously seen by my partner, Dr Keyla Raymundo  Last seen in 2011  Patient was on Proscar for lower urinary tract symptoms  He had previously undergone a prostate biopsy in 2009 which was negative for cancer      Patient denies prostate cancer history in his family  His brother has undergone a transrectal resection of prostate for obstructive urinary symptoms  PSA from May 17, 2016 demonstrates a total PSA of 16 8   PSA from 9/21/18 19 3     He presented again in 2017 with acute urinary retention  He was transferred from an outside hospital due to difficult placing a Fowler catheter the emergency room  Catheter was able to be placed at [address]  800 mL plus was returned  The patient was started on Flomax      After evaluation for his gross hematuria, patient was restarted on Proscar  Hematuria workup was undertaken  The patient did undergo a cystoscopy and a second transrectal biopsy the prostate in February 2017      He was noted to have a markedly enlarged prostate at 138 g  He did well after the prostate biopsy and had minimal issues or side effects  Patient is maintained on Proscar and Flomax  Given an elevated PSA in September after prior biopsy, the patient agreed to undergo multiparametric MRI  Lab Results   Component Value Date    PSA 4 7 (H) 01/15/2020      the patient has had dramatic reduction in his PSA on Proscar  His PSA previously approach 20 and is now 4 7  After 2- biopsies and an MRI that demonstrated a prostate size approaching 140 g, I am pleased to say that we can be comfortable that likely his risk of cancer is extremely low    The patient also feels as though he has improved his urinary function on dual therapy  Past Medical History:     Past Medical History:   Diagnosis Date    Asthma     Enlarged prostate     Hyperlipidemia     Hypertension     Lumbar herniated disc        PAST SURGICAL HISTORY:     Past Surgical History:   Procedure Laterality Date    LUNG BIOPSY      PROSTATE BIOPSY         CURRENT MEDICATIONS:     Current Outpatient Medications   Medication Sig Dispense Refill    albuterol (PROVENTIL HFA,VENTOLIN HFA) 90 mcg/act inhaler Inhale 2 puffs every 6 (six) hours as needed for wheezing      apixaban (ELIQUIS) 5 mg 2 tablets twice a day until June 8, then on June 9 change to 1 tablet twice a day   budesonide-formoterol (SYMBICORT) 160-4 5 mcg/act inhaler Inhale 2 puffs      Capsaicin-Menthol-Methyl Anil (DENDRACIN NEURODENDRAXCIN) 0 025-10-30 % LOTN Apply topically      Cholecalciferol (VITAMIN D3 PO) Take 1 tablet by mouth daily      cyclobenzaprine (FLEXERIL) 10 mg tablet Take 10 mg by mouth      finasteride (PROSCAR) 5 mg tablet take 1 tablet by mouth once daily 30 tablet 11    gabapentin (NEURONTIN) 300 mg capsule Take 300 mg by mouth 2 (two) times a day      lisinopril (ZESTRIL) 40 mg tablet Take 40 mg by mouth daily      meloxicam (MOBIC) 7 5 mg tablet Take 7 5 mg by mouth daily      montelukast (SINGULAIR) 10 mg tablet   0    multivitamin (THERAGRAN) TABS Take 1 tablet by mouth daily      Omega-3 Fatty Acids (FISH OIL) 1200 MG CAPS Take 1 capsule by mouth 2 (two) times a day      Red Yeast Rice Extract (RED YEAST RICE PO) Take by mouth      tamsulosin (FLOMAX) 0 4 mg take 1 capsule by mouth twice a day 60 capsule 11    Zolpidem Tartrate (AMBIEN PO) Take 10 mg by mouth daily at bedtime      ranitidine (ZANTAC) 150 mg tablet Take 150 mg by mouth 2 (two) times a day      traMADol (ULTRAM) 50 mg tablet Take 50 mg by mouth every 6 (six) hours as needed for moderate pain       No current facility-administered medications for this visit          ALLERGIES:     Allergies Allergen Reactions    Percocet [Oxycodone-Acetaminophen] Itching       SOCIAL HISTORY:     Social History     Socioeconomic History    Marital status: /Civil Union     Spouse name: None    Number of children: None    Years of education: None    Highest education level: None   Occupational History    Occupation: laboror   Social Needs    Financial resource strain: None    Food insecurity:     Worry: None     Inability: None    Transportation needs:     Medical: None     Non-medical: None   Tobacco Use    Smoking status: Never Smoker    Smokeless tobacco: Never Used   Substance and Sexual Activity    Alcohol use: No    Drug use: No    Sexual activity: None   Lifestyle    Physical activity:     Days per week: None     Minutes per session: None    Stress: None   Relationships    Social connections:     Talks on phone: None     Gets together: None     Attends Scientology service: None     Active member of club or organization: None     Attends meetings of clubs or organizations: None     Relationship status: None    Intimate partner violence:     Fear of current or ex partner: None     Emotionally abused: None     Physically abused: None     Forced sexual activity: None   Other Topics Concern    None   Social History Narrative    None       SOCIAL HISTORY:   History reviewed  No pertinent family history  REVIEW OF SYSTEMS:     Review of Systems   Constitutional: Negative  Respiratory: Negative  Cardiovascular: Negative  Genitourinary: Negative for difficulty urinating, frequency and urgency  Musculoskeletal: Negative  Neurological: Negative  Psychiatric/Behavioral: Negative  PHYSICAL EXAM:     /84 (BP Location: Left arm, Patient Position: Sitting, Cuff Size: Standard)   Pulse 81   Ht 6' (1 829 m)   Wt (!) 140 kg (308 lb)   BMI 41 77 kg/m²     General:  Healthy appearing male in no acute distress  They have a normal affect    There is not appear to be any gross neurologic defects or abnormalities  HEENT:  Normocephalic, atraumatic  Neck is supple without any palpable lymphadenopathy  Cardiovascular:  Patient has normal palpable distal radial pulses  There is no significant peripheral edema  No JVD is noted  Respiratory:  Patient has unlabored respirations  There is no audible wheeze or rhonchi  Abdomen:  Abdomen is obese without surgical scars  Abdomen is soft and nontender  There is no tympany  Inguinal and umbilical hernia are not appreciated  Musculoskeletal:  Patient does not have significant CVA tenderness in the  flank with palpation or percussion  They full range of motion in all 4 extremities  Strength in all 4 extremities appears congruent  Patient is able to ambulate without assistance or difficulty  Dermatologic:  Patient has no skin abnormalities or rashes  LABS:     CBC: No results found for: WBC, HGB, HCT, MCV, PLT    BMP:   Lab Results   Component Value Date    CALCIUM 9 3 2017    K 3 8 2017    CO2 24 2017     2017    BUN 22 2017    CREATININE 0 96 2017       PSA from May 17, 2016 demonstrates a total PSA of 16 8   PSA from 18 19 3  Lab Results   Component Value Date    PSA 4 7 (H) 01/15/2020     IMAGIN/4/18  MULTIPARAMETRIC MRI OF THE PROSTATE WITH AND WITHOUT CONTRAST      INDICATION:   R97 20: Elevated prostate specific antigen (PSA)      COMPARISON: None      PSA LEVEL: 19 3 ng/ml  on 2018  Jean Marie Ledesma PRIOR BIOPSY DATE:     BIOPSY RESULTS: Negative      TECHNIQUE: The following pulse sequences were obtained on a 1 5 T scanner:  T1w axial; T2w axial, sagittal and coronal; DWI axial and ADC map; T1w water, fat, in-phase and opposed-phase axials of entire pelvis and dynamic 3D T1w axial before and during   IV contrast injection      CONTRAST:  Gadobutrol (Gadavist) 17 mL of Gadobutrol injection (SINGLE-DOSE) ml      TECHNICAL LIMITATIONS: None         FINDINGS:     PROSTATE:     Size (AP x TRV x CC): 5 9 x 5 7 x 6 1 = 107 mL  Post-biopsy hemorrhage:  None  Central gland enlargement (BPH): Marked      Focal lesions - No dominant lesion  Heterogeneous peripheral zone  PI-RADSv2 Category 2 - Low (clinically significant cancer unlikely)      Note: Clinically significant cancer is defined on pathology/histology as [place] score greater than or equal to 7, and/or volume of greater than or equal to 0 5 mL, and/or extraprostatic extension      Seminal vesicles: Unremarkable      URINARY BLADDER: Mildly thickened wall, likely a sequela of chronic bladder outlet obstruction      LYMPH NODES: No pelvic lymphadenopathy      BONES: No suspicious osseous lesion         IMPRESSION:     1  PI-RADSv2 Category 2 - Low (clinically significant cancer is unlikely to be present)      2  No extraprostatic tumor, seminal vesicle invasion, pelvic lymphadenopathy, or pelvic osseous metastatic disease      3  Marked BPH with calculated prostate volume of 107 mL  PATHOLOGY:     2/2017 - 138grams on TRUS  Prostate pathology from biopsy - negative      12/2009      ASSESSMENT:     61 y o  male with enlarged prostate, >130g, and moderate LUTs on dual therapy    PLAN:     Patient doing well now on Proscar  PSA has reduced  To almost 25% of what  Was previously  Patient comfortable with urinary symptoms  No plans for surgical intervention on the benign prostate tissue at this point  Patient return to see me in 1 year with repeat PSA  Knows to call with worsening symptoms or recurrent hematuria

## 2020-04-06 ENCOUNTER — NURSE TRIAGE (OUTPATIENT)
Dept: OTHER | Facility: OTHER | Age: 60
End: 2020-04-06

## 2020-04-06 ENCOUNTER — OFFICE VISIT (OUTPATIENT)
Dept: URGENT CARE | Facility: MEDICAL CENTER | Age: 60
End: 2020-04-06
Payer: COMMERCIAL

## 2020-04-06 VITALS
TEMPERATURE: 97.6 F | OXYGEN SATURATION: 98 % | HEIGHT: 73 IN | SYSTOLIC BLOOD PRESSURE: 144 MMHG | BODY MASS INDEX: 39.76 KG/M2 | WEIGHT: 300 LBS | DIASTOLIC BLOOD PRESSURE: 82 MMHG | HEART RATE: 78 BPM | RESPIRATION RATE: 16 BRPM

## 2020-04-06 DIAGNOSIS — J20.8 ACUTE VIRAL BRONCHITIS: Primary | ICD-10-CM

## 2020-04-06 PROCEDURE — G0383 LEV 4 HOSP TYPE B ED VISIT: HCPCS | Performed by: PHYSICIAN ASSISTANT

## 2020-04-06 PROCEDURE — 87635 SARS-COV-2 COVID-19 AMP PRB: CPT | Performed by: PHYSICIAN ASSISTANT

## 2020-04-08 ENCOUNTER — TELEPHONE (OUTPATIENT)
Dept: URGENT CARE | Facility: MEDICAL CENTER | Age: 60
End: 2020-04-08

## 2020-04-08 LAB — SARS-COV-2 RNA SPEC QL NAA+PROBE: NOT DETECTED

## 2020-11-03 DIAGNOSIS — N40.1 BENIGN PROSTATIC HYPERPLASIA WITH LOWER URINARY TRACT SYMPTOMS, SYMPTOM DETAILS UNSPECIFIED: ICD-10-CM

## 2020-11-03 RX ORDER — TAMSULOSIN HYDROCHLORIDE 0.4 MG/1
0.4 CAPSULE ORAL 2 TIMES DAILY
Qty: 180 CAPSULE | Refills: 1 | Status: SHIPPED | OUTPATIENT
Start: 2020-11-03 | End: 2021-04-17

## 2020-12-03 ENCOUNTER — TELEPHONE (OUTPATIENT)
Dept: UROLOGY | Facility: CLINIC | Age: 60
End: 2020-12-03

## 2021-01-17 DIAGNOSIS — N40.1 BENIGN PROSTATIC HYPERPLASIA WITH LOWER URINARY TRACT SYMPTOMS, SYMPTOM DETAILS UNSPECIFIED: ICD-10-CM

## 2021-01-17 RX ORDER — FINASTERIDE 5 MG/1
5 TABLET, FILM COATED ORAL DAILY
Qty: 90 TABLET | Refills: 0 | Status: SHIPPED | OUTPATIENT
Start: 2021-01-17 | End: 2021-04-30 | Stop reason: SDUPTHER

## 2021-01-17 NOTE — TELEPHONE ENCOUNTER
An Auto-fax Refill Request for Finasteride 5mg was received from [facility] [place] #[id number]        The patient has an upcoming office visit scheduled for 2/12/21 with Dr Jazzy Reveles in the [place] [address] location but will run out of medication until then    Request for same, 90 day supply (180 capsules) with NO refills was queued and forwarded to the Advanced Practitioner covering the [facility] location for approval

## 2021-03-21 ENCOUNTER — APPOINTMENT (EMERGENCY)
Dept: RADIOLOGY | Facility: HOSPITAL | Age: 61
DRG: 871 | End: 2021-03-21
Payer: COMMERCIAL

## 2021-03-21 ENCOUNTER — HOSPITAL ENCOUNTER (INPATIENT)
Facility: HOSPITAL | Age: 61
LOS: 6 days | Discharge: HOME | DRG: 871 | End: 2021-03-28
Attending: EMERGENCY MEDICINE | Admitting: INTERNAL MEDICINE
Payer: COMMERCIAL

## 2021-03-21 DIAGNOSIS — J12.82 PNEUMONIA DUE TO COVID-19 VIRUS: Primary | ICD-10-CM

## 2021-03-21 DIAGNOSIS — J45.909 MODERATE ASTHMA WITHOUT COMPLICATION, UNSPECIFIED WHETHER PERSISTENT: ICD-10-CM

## 2021-03-21 DIAGNOSIS — U07.1 PNEUMONIA DUE TO COVID-19 VIRUS: Primary | ICD-10-CM

## 2021-03-21 PROCEDURE — 86140 C-REACTIVE PROTEIN: CPT | Performed by: PHYSICIAN ASSISTANT

## 2021-03-21 PROCEDURE — 83735 ASSAY OF MAGNESIUM: CPT | Performed by: EMERGENCY MEDICINE

## 2021-03-21 PROCEDURE — 99285 EMERGENCY DEPT VISIT HI MDM: CPT

## 2021-03-21 PROCEDURE — 80053 COMPREHEN METABOLIC PANEL: CPT | Performed by: EMERGENCY MEDICINE

## 2021-03-21 PROCEDURE — 84484 ASSAY OF TROPONIN QUANT: CPT | Performed by: EMERGENCY MEDICINE

## 2021-03-21 PROCEDURE — 0241U HB NFCT DS VIR RESP RNA 4 TRGT: CPT | Performed by: EMERGENCY MEDICINE

## 2021-03-21 PROCEDURE — 82728 ASSAY OF FERRITIN: CPT | Performed by: PHYSICIAN ASSISTANT

## 2021-03-21 PROCEDURE — 36415 COLL VENOUS BLD VENIPUNCTURE: CPT | Performed by: EMERGENCY MEDICINE

## 2021-03-21 PROCEDURE — 85025 COMPLETE CBC W/AUTO DIFF WBC: CPT | Performed by: EMERGENCY MEDICINE

## 2021-03-21 PROCEDURE — 71045 X-RAY EXAM CHEST 1 VIEW: CPT

## 2021-03-21 PROCEDURE — 93005 ELECTROCARDIOGRAM TRACING: CPT

## 2021-03-21 PROCEDURE — 83880 ASSAY OF NATRIURETIC PEPTIDE: CPT | Performed by: EMERGENCY MEDICINE

## 2021-03-22 PROBLEM — I10 ESSENTIAL HYPERTENSION: Chronic | Status: ACTIVE | Noted: 2021-03-22

## 2021-03-22 PROBLEM — A41.89 SEPSIS DUE TO COVID-19 (HCC): Status: ACTIVE | Noted: 2021-03-22

## 2021-03-22 PROBLEM — Z86.718 HISTORY OF DVT (DEEP VEIN THROMBOSIS): Chronic | Status: ACTIVE | Noted: 2021-03-22

## 2021-03-22 PROBLEM — J45.909 MODERATE ASTHMA WITHOUT COMPLICATION: Chronic | Status: ACTIVE | Noted: 2021-03-22

## 2021-03-22 PROBLEM — J12.82 PNEUMONIA DUE TO COVID-19 VIRUS: Status: ACTIVE | Noted: 2021-03-22

## 2021-03-22 PROBLEM — U07.1 PNEUMONIA DUE TO COVID-19 VIRUS: Status: ACTIVE | Noted: 2021-03-22

## 2021-03-22 PROBLEM — U07.1 SEPSIS DUE TO COVID-19 (HCC): Status: ACTIVE | Noted: 2021-03-22

## 2021-03-22 PROBLEM — N40.1 BENIGN PROSTATIC HYPERPLASIA WITH LOWER URINARY TRACT SYMPTOMS: Chronic | Status: ACTIVE | Noted: 2018-10-16

## 2021-03-22 LAB
ALBUMIN SERPL BCP-MCNC: 3.8 G/DL (ref 3.5–5.7)
ALBUMIN SERPL BCP-MCNC: 4.1 G/DL (ref 3.5–5.7)
ALP SERPL-CCNC: 29 U/L (ref 55–165)
ALP SERPL-CCNC: 30 U/L (ref 55–165)
ALT SERPL W P-5'-P-CCNC: 34 U/L (ref 7–52)
ALT SERPL W P-5'-P-CCNC: 36 U/L (ref 7–52)
ANION GAP SERPL CALCULATED.3IONS-SCNC: 11 MMOL/L (ref 4–13)
ANION GAP SERPL CALCULATED.3IONS-SCNC: 12 MMOL/L (ref 4–13)
AST SERPL W P-5'-P-CCNC: 47 U/L (ref 13–39)
AST SERPL W P-5'-P-CCNC: 48 U/L (ref 13–39)
ATRIAL RATE: 85 BPM
BACTERIA UR QL AUTO: ABNORMAL /HPF
BASOPHILS # BLD AUTO: 0 THOUSANDS/ΜL (ref 0–0.1)
BASOPHILS NFR BLD AUTO: 0 % (ref 0–2)
BILIRUB SERPL-MCNC: 0.7 MG/DL (ref 0.2–1)
BILIRUB SERPL-MCNC: 0.8 MG/DL (ref 0.2–1)
BILIRUB UR QL STRIP: NEGATIVE
BNP SERPL-MCNC: 46 PG/ML (ref 1–100)
BUN SERPL-MCNC: 17 MG/DL (ref 7–25)
BUN SERPL-MCNC: 19 MG/DL (ref 7–25)
CALCIUM SERPL-MCNC: 8.4 MG/DL (ref 8.6–10.5)
CALCIUM SERPL-MCNC: 8.9 MG/DL (ref 8.6–10.5)
CHLORIDE SERPL-SCNC: 102 MMOL/L (ref 98–107)
CHLORIDE SERPL-SCNC: 99 MMOL/L (ref 98–107)
CLARITY UR: CLEAR
CO2 SERPL-SCNC: 22 MMOL/L (ref 21–31)
CO2 SERPL-SCNC: 23 MMOL/L (ref 21–31)
COLOR UR: YELLOW
CREAT SERPL-MCNC: 0.78 MG/DL (ref 0.7–1.3)
CREAT SERPL-MCNC: 0.89 MG/DL (ref 0.7–1.3)
CRP SERPL QL: 33 MG/L
D DIMER PPP FEU-MCNC: 0.48 UG/ML FEU
EOSINOPHIL # BLD AUTO: 0 THOUSAND/ΜL (ref 0–0.61)
EOSINOPHIL NFR BLD AUTO: 0 % (ref 0–5)
ERYTHROCYTE [DISTWIDTH] IN BLOOD BY AUTOMATED COUNT: 13.9 % (ref 11.5–14.5)
FERRITIN SERPL-MCNC: 628 NG/ML (ref 8–388)
FLUAV RNA RESP QL NAA+PROBE: NEGATIVE
FLUBV RNA RESP QL NAA+PROBE: NEGATIVE
GFR SERPL CREATININE-BSD FRML MDRD: 93 ML/MIN/1.73SQ M
GFR SERPL CREATININE-BSD FRML MDRD: 98 ML/MIN/1.73SQ M
GLUCOSE SERPL-MCNC: 112 MG/DL (ref 65–99)
GLUCOSE SERPL-MCNC: 120 MG/DL (ref 65–99)
GLUCOSE UR STRIP-MCNC: NEGATIVE MG/DL
HCT VFR BLD AUTO: 43.3 % (ref 42–47)
HGB BLD-MCNC: 14.8 G/DL (ref 14–18)
HGB UR QL STRIP.AUTO: ABNORMAL
KETONES UR STRIP-MCNC: NEGATIVE MG/DL
LEUKOCYTE ESTERASE UR QL STRIP: NEGATIVE
LYMPHOCYTES # BLD AUTO: 0.8 THOUSANDS/ΜL (ref 0.6–4.47)
LYMPHOCYTES NFR BLD AUTO: 15 % (ref 21–51)
MAGNESIUM SERPL-MCNC: 2.1 MG/DL (ref 1.9–2.7)
MCH RBC QN AUTO: 31.7 PG (ref 26–34)
MCHC RBC AUTO-ENTMCNC: 34.1 G/DL (ref 31–37)
MCV RBC AUTO: 93 FL (ref 81–99)
MONOCYTES # BLD AUTO: 0.5 THOUSAND/ΜL (ref 0.17–1.22)
MONOCYTES NFR BLD AUTO: 11 % (ref 2–12)
MUCOUS THREADS UR QL AUTO: ABNORMAL
NEUTROPHILS # BLD AUTO: 3.7 THOUSANDS/ΜL (ref 1.4–6.5)
NEUTS SEG NFR BLD AUTO: 74 % (ref 42–75)
NITRITE UR QL STRIP: NEGATIVE
NON-SQ EPI CELLS URNS QL MICRO: ABNORMAL /HPF
P AXIS: 55 DEGREES
PH UR STRIP.AUTO: 6 [PH]
PLATELET # BLD AUTO: 152 THOUSANDS/UL (ref 149–390)
PMV BLD AUTO: 9 FL (ref 8.6–11.7)
POTASSIUM SERPL-SCNC: 3.3 MMOL/L (ref 3.5–5.5)
POTASSIUM SERPL-SCNC: 3.5 MMOL/L (ref 3.5–5.5)
PR INTERVAL: 166 MS
PROCALCITONIN SERPL-MCNC: <0.05 NG/ML
PROT SERPL-MCNC: 7 G/DL (ref 6.4–8.9)
PROT SERPL-MCNC: 7.5 G/DL (ref 6.4–8.9)
PROT UR STRIP-MCNC: NEGATIVE MG/DL
QRS AXIS: 42 DEGREES
QRSD INTERVAL: 92 MS
QT INTERVAL: 354 MS
QTC INTERVAL: 421 MS
RBC # BLD AUTO: 4.67 MILLION/UL (ref 4.3–5.9)
RBC #/AREA URNS AUTO: ABNORMAL /HPF
RSV RNA RESP QL NAA+PROBE: NEGATIVE
SARS-COV-2 RNA RESP QL NAA+PROBE: POSITIVE
SODIUM SERPL-SCNC: 133 MMOL/L (ref 134–143)
SODIUM SERPL-SCNC: 136 MMOL/L (ref 134–143)
SP GR UR STRIP.AUTO: 1.02 (ref 1–1.03)
T WAVE AXIS: 23 DEGREES
TROPONIN I SERPL-MCNC: <0.03 NG/ML
UROBILINOGEN UR QL STRIP.AUTO: 0.2 E.U./DL
VENTRICULAR RATE: 85 BPM
WBC # BLD AUTO: 5 THOUSAND/UL (ref 4.8–10.8)
WBC #/AREA URNS AUTO: ABNORMAL /HPF

## 2021-03-22 PROCEDURE — 99285 EMERGENCY DEPT VISIT HI MDM: CPT | Performed by: EMERGENCY MEDICINE

## 2021-03-22 PROCEDURE — 85379 FIBRIN DEGRADATION QUANT: CPT | Performed by: PHYSICIAN ASSISTANT

## 2021-03-22 PROCEDURE — 94760 N-INVAS EAR/PLS OXIMETRY 1: CPT

## 2021-03-22 PROCEDURE — 93010 ELECTROCARDIOGRAM REPORT: CPT | Performed by: INTERNAL MEDICINE

## 2021-03-22 PROCEDURE — 96360 HYDRATION IV INFUSION INIT: CPT

## 2021-03-22 PROCEDURE — XW033E5 INTRODUCTION OF REMDESIVIR ANTI-INFECTIVE INTO PERIPHERAL VEIN, PERCUTANEOUS APPROACH, NEW TECHNOLOGY GROUP 5: ICD-10-PCS | Performed by: INTERNAL MEDICINE

## 2021-03-22 PROCEDURE — 80053 COMPREHEN METABOLIC PANEL: CPT | Performed by: PHYSICIAN ASSISTANT

## 2021-03-22 PROCEDURE — 99220 PR INITIAL OBSERVATION CARE/DAY 70 MINUTES: CPT | Performed by: PHYSICIAN ASSISTANT

## 2021-03-22 PROCEDURE — 84145 PROCALCITONIN (PCT): CPT | Performed by: PHYSICIAN ASSISTANT

## 2021-03-22 PROCEDURE — 81001 URINALYSIS AUTO W/SCOPE: CPT | Performed by: EMERGENCY MEDICINE

## 2021-03-22 RX ORDER — ASCORBIC ACID 500 MG
1000 TABLET ORAL EVERY 12 HOURS SCHEDULED
Status: COMPLETED | OUTPATIENT
Start: 2021-03-22 | End: 2021-03-28

## 2021-03-22 RX ORDER — ALBUTEROL SULFATE 90 UG/1
2 AEROSOL, METERED RESPIRATORY (INHALATION) EVERY 6 HOURS PRN
Status: DISCONTINUED | OUTPATIENT
Start: 2021-03-22 | End: 2021-03-28 | Stop reason: HOSPADM

## 2021-03-22 RX ORDER — TAMSULOSIN HYDROCHLORIDE 0.4 MG/1
0.4 CAPSULE ORAL 2 TIMES DAILY
Status: DISCONTINUED | OUTPATIENT
Start: 2021-03-22 | End: 2021-03-28 | Stop reason: HOSPADM

## 2021-03-22 RX ORDER — ZINC SULFATE 50(220)MG
220 CAPSULE ORAL DAILY
Status: COMPLETED | OUTPATIENT
Start: 2021-03-22 | End: 2021-03-28

## 2021-03-22 RX ORDER — ACETAMINOPHEN 325 MG/1
650 TABLET ORAL EVERY 4 HOURS PRN
Status: DISCONTINUED | OUTPATIENT
Start: 2021-03-22 | End: 2021-03-28 | Stop reason: HOSPADM

## 2021-03-22 RX ORDER — FAMOTIDINE 20 MG/1
20 TABLET, FILM COATED ORAL 2 TIMES DAILY
Status: DISCONTINUED | OUTPATIENT
Start: 2021-03-22 | End: 2021-03-28 | Stop reason: HOSPADM

## 2021-03-22 RX ORDER — DEXAMETHASONE SODIUM PHOSPHATE 4 MG/ML
6 INJECTION, SOLUTION INTRA-ARTICULAR; INTRALESIONAL; INTRAMUSCULAR; INTRAVENOUS; SOFT TISSUE ONCE
Status: COMPLETED | OUTPATIENT
Start: 2021-03-22 | End: 2021-03-22

## 2021-03-22 RX ORDER — ACETAMINOPHEN 325 MG/1
650 TABLET ORAL ONCE
Status: COMPLETED | OUTPATIENT
Start: 2021-03-22 | End: 2021-03-22

## 2021-03-22 RX ORDER — DEXAMETHASONE SODIUM PHOSPHATE 4 MG/ML
6 INJECTION, SOLUTION INTRA-ARTICULAR; INTRALESIONAL; INTRAMUSCULAR; INTRAVENOUS; SOFT TISSUE EVERY 24 HOURS
Status: DISCONTINUED | OUTPATIENT
Start: 2021-03-23 | End: 2021-03-28 | Stop reason: HOSPADM

## 2021-03-22 RX ORDER — GABAPENTIN 300 MG/1
300 CAPSULE ORAL
Status: DISCONTINUED | OUTPATIENT
Start: 2021-03-22 | End: 2021-03-28 | Stop reason: HOSPADM

## 2021-03-22 RX ORDER — CHLORAL HYDRATE 500 MG
1000 CAPSULE ORAL 2 TIMES DAILY
Status: DISCONTINUED | OUTPATIENT
Start: 2021-03-22 | End: 2021-03-28 | Stop reason: HOSPADM

## 2021-03-22 RX ORDER — DOXYCYCLINE HYCLATE 100 MG/1
100 CAPSULE ORAL EVERY 12 HOURS
Status: DISCONTINUED | OUTPATIENT
Start: 2021-03-22 | End: 2021-03-22

## 2021-03-22 RX ORDER — LISINOPRIL 20 MG/1
40 TABLET ORAL DAILY
Status: DISCONTINUED | OUTPATIENT
Start: 2021-03-22 | End: 2021-03-28 | Stop reason: HOSPADM

## 2021-03-22 RX ORDER — MELATONIN
2000 DAILY
Status: DISCONTINUED | OUTPATIENT
Start: 2021-03-22 | End: 2021-03-28 | Stop reason: HOSPADM

## 2021-03-22 RX ORDER — ONDANSETRON 2 MG/ML
4 INJECTION INTRAMUSCULAR; INTRAVENOUS EVERY 6 HOURS PRN
Status: DISCONTINUED | OUTPATIENT
Start: 2021-03-22 | End: 2021-03-28 | Stop reason: HOSPADM

## 2021-03-22 RX ORDER — FINASTERIDE 5 MG/1
5 TABLET, FILM COATED ORAL DAILY
Status: DISCONTINUED | OUTPATIENT
Start: 2021-03-22 | End: 2021-03-28 | Stop reason: HOSPADM

## 2021-03-22 RX ORDER — DOXYCYCLINE HYCLATE 100 MG/1
100 CAPSULE ORAL EVERY 12 HOURS
Status: DISCONTINUED | OUTPATIENT
Start: 2021-03-22 | End: 2021-03-23

## 2021-03-22 RX ORDER — POTASSIUM CHLORIDE 20 MEQ/1
40 TABLET, EXTENDED RELEASE ORAL ONCE
Status: COMPLETED | OUTPATIENT
Start: 2021-03-22 | End: 2021-03-22

## 2021-03-22 RX ORDER — MULTIVITAMIN/IRON/FOLIC ACID 18MG-0.4MG
1 TABLET ORAL DAILY
Status: DISCONTINUED | OUTPATIENT
Start: 2021-03-29 | End: 2021-03-28 | Stop reason: HOSPADM

## 2021-03-22 RX ORDER — BUDESONIDE AND FORMOTEROL FUMARATE DIHYDRATE 160; 4.5 UG/1; UG/1
2 AEROSOL RESPIRATORY (INHALATION) 2 TIMES DAILY
Status: DISCONTINUED | OUTPATIENT
Start: 2021-03-22 | End: 2021-03-28 | Stop reason: HOSPADM

## 2021-03-22 RX ORDER — CEFTRIAXONE 1 G/50ML
1000 INJECTION, SOLUTION INTRAVENOUS EVERY 24 HOURS
Status: DISCONTINUED | OUTPATIENT
Start: 2021-03-22 | End: 2021-03-23

## 2021-03-22 RX ORDER — SODIUM CHLORIDE 9 MG/ML
100 INJECTION, SOLUTION INTRAVENOUS CONTINUOUS
Status: DISCONTINUED | OUTPATIENT
Start: 2021-03-22 | End: 2021-03-22

## 2021-03-22 RX ADMIN — GABAPENTIN 300 MG: 300 CAPSULE ORAL at 22:10

## 2021-03-22 RX ADMIN — OXYCODONE HYDROCHLORIDE AND ACETAMINOPHEN 1000 MG: 500 TABLET ORAL at 03:50

## 2021-03-22 RX ADMIN — DOXYCYCLINE 100 MG: 100 CAPSULE ORAL at 22:10

## 2021-03-22 RX ADMIN — BUDESONIDE AND FORMOTEROL FUMARATE DIHYDRATE 2 PUFF: 160; 4.5 AEROSOL RESPIRATORY (INHALATION) at 08:52

## 2021-03-22 RX ADMIN — APIXABAN 5 MG: 5 TABLET, FILM COATED ORAL at 17:00

## 2021-03-22 RX ADMIN — FINASTERIDE 5 MG: 5 TABLET, FILM COATED ORAL at 08:51

## 2021-03-22 RX ADMIN — POTASSIUM CHLORIDE 40 MEQ: 1500 TABLET, EXTENDED RELEASE ORAL at 16:34

## 2021-03-22 RX ADMIN — CEFTRIAXONE 1000 MG: 1 INJECTION, SOLUTION INTRAVENOUS at 03:50

## 2021-03-22 RX ADMIN — DEXAMETHASONE SODIUM PHOSPHATE 6 MG: 4 INJECTION, SOLUTION INTRA-ARTICULAR; INTRALESIONAL; INTRAMUSCULAR; INTRAVENOUS; SOFT TISSUE at 03:51

## 2021-03-22 RX ADMIN — ACETAMINOPHEN 650 MG: 325 TABLET ORAL at 00:30

## 2021-03-22 RX ADMIN — OXYCODONE HYDROCHLORIDE AND ACETAMINOPHEN 1000 MG: 500 TABLET ORAL at 22:09

## 2021-03-22 RX ADMIN — LISINOPRIL 40 MG: 20 TABLET ORAL at 08:51

## 2021-03-22 RX ADMIN — DOXYCYCLINE 100 MG: 100 CAPSULE ORAL at 08:51

## 2021-03-22 RX ADMIN — FAMOTIDINE 20 MG: 20 TABLET ORAL at 17:00

## 2021-03-22 RX ADMIN — BUDESONIDE AND FORMOTEROL FUMARATE DIHYDRATE 2 PUFF: 160; 4.5 AEROSOL RESPIRATORY (INHALATION) at 17:00

## 2021-03-22 RX ADMIN — SODIUM CHLORIDE 1000 ML: 0.9 INJECTION, SOLUTION INTRAVENOUS at 00:30

## 2021-03-22 RX ADMIN — ZINC SULFATE 220 MG (50 MG) CAPSULE 220 MG: CAPSULE at 08:51

## 2021-03-22 RX ADMIN — OMEGA-3 FATTY ACIDS CAP 1000 MG 1000 MG: 1000 CAP at 08:51

## 2021-03-22 RX ADMIN — SODIUM CHLORIDE 100 ML/HR: 0.9 INJECTION, SOLUTION INTRAVENOUS at 17:04

## 2021-03-22 RX ADMIN — Medication 2000 UNITS: at 08:51

## 2021-03-22 RX ADMIN — GABAPENTIN 300 MG: 300 CAPSULE ORAL at 03:54

## 2021-03-22 RX ADMIN — SODIUM CHLORIDE 100 ML/HR: 0.9 INJECTION, SOLUTION INTRAVENOUS at 03:50

## 2021-03-22 RX ADMIN — FAMOTIDINE 20 MG: 20 TABLET ORAL at 08:51

## 2021-03-22 RX ADMIN — TAMSULOSIN HYDROCHLORIDE 0.4 MG: 0.4 CAPSULE ORAL at 08:51

## 2021-03-22 RX ADMIN — OXYCODONE HYDROCHLORIDE AND ACETAMINOPHEN 1000 MG: 500 TABLET ORAL at 08:52

## 2021-03-22 RX ADMIN — TAMSULOSIN HYDROCHLORIDE 0.4 MG: 0.4 CAPSULE ORAL at 17:00

## 2021-03-22 RX ADMIN — REMDESIVIR 200 MG: 100 INJECTION, POWDER, LYOPHILIZED, FOR SOLUTION INTRAVENOUS at 08:53

## 2021-03-22 RX ADMIN — OMEGA-3 FATTY ACIDS CAP 1000 MG 1000 MG: 1000 CAP at 17:00

## 2021-03-22 RX ADMIN — APIXABAN 5 MG: 5 TABLET, FILM COATED ORAL at 08:51

## 2021-03-22 NOTE — RESPIRATORY THERAPY NOTE
RT Protocol Note  Kala Lott 61 y o  male MRN: [id number]  Unit/Bed#: -01 Encounter: [id number]    Assessment    Principal Problem:    Sepsis due to COVID-19 [facility])  Active Problems:    Benign prostatic hyperplasia with lower urinary tract symptoms    Pneumonia due to COVID-19 virus    Essential hypertension    Moderate asthma without complication    History of DVT (deep vein thrombosis)      Home Pulmonary Medications:    Home Devices/Therapy: (P) Other (Comment)(pt  denies home 02 ,CPAP/BiPAP)    Past Medical History:   Diagnosis Date    Asthma     Enlarged prostate     Hyperlipidemia     Hypertension     Lumbar herniated disc      Social History     Socioeconomic History    Marital status: /Civil Union     Spouse name: None    Number of children: None    Years of education: None    Highest education level: None   Occupational History    Occupation: laboror   Social Needs    Financial resource strain: None    Food insecurity     Worry: None     Inability: None    Transportation needs     Medical: None     Non-medical: None   Tobacco Use    Smoking status: Never Smoker    Smokeless tobacco: Never Used   Substance and Sexual Activity    Alcohol use: Yes    Drug use: No    Sexual activity: None   Lifestyle    Physical activity     Days per week: None     Minutes per session: None    Stress: None   Relationships    Social connections     Talks on phone: None     Gets together: None     Attends Hoahaoism service: None     Active member of club or organization: None     Attends meetings of clubs or organizations: None     Relationship status: None    Intimate partner violence     Fear of current or ex partner: None     Emotionally abused: None     Physically abused: None     Forced sexual activity: None   Other Topics Concern    None   Social History Narrative    None       Subjective Pt  Is Covid +  Home bronchodialator pathway followed   Pt  Will call if he needs further asst from respiratory therapy  Protocol DC'D  Objective    Physical Exam:   Assessment Type: (P) Assess only  General Appearance: (P) Alert, Awake  Respiratory Pattern: (P) Dyspnea with exertion  Chest Assessment: (P) Chest expansion symmetrical  Bilateral Breath Sounds: (P) Diminished, Expiratory wheezes(Ft  expiratory whz's noted T/O)    Vitals:  Blood pressure 140/78, pulse 74, temperature (!) 96 5 °F (35 8 °C), temperature source Temporal, resp  rate 20, height 6' (1 829 m), weight 121 kg (267 lb 3 2 oz), SpO2 94 %  Imaging and other studies: I have personally reviewed pertinent reports              Plan    Respiratory Plan: (P) Home Bronchodilator Patient pathway, Discontinue Protocol  Airway Clearance Plan: (P) Incentive Spirometer     Resp Comments: (P) Nursing asst'd with assessment

## 2021-03-22 NOTE — H&P
[facility]  H&P- Tyler Juarez 1960, 61 y o  male MRN: [id number]  Unit/Bed#: FARZAD Encounter: [id number]  Primary Care Provider: Gabby Yuen MD   Date and time admitted to hospital: 3/21/2021 10:56 PM    * Sepsis due to COVID-19 [facility])  Assessment & Plan  · Noted by fever and tachypnea  · Secondary to COVID infection      Pneumonia due to COVID-19 virus  Assessment & Plan  · Start COVID treatment moderate pathway patient currently on 4 L  · IV antibiotics, doxycycline, steroids, remdesivir, vitamin-C, vitamin-D  · Respiratory protocol  · Follow-up serial labs    History of DVT (deep vein thrombosis)  Assessment & Plan  · And pulmonary emboli  · Continue Eliquis 5 mg b i d     Moderate asthma without complication  Assessment & Plan  · Continue Symbicort and Respiratory protocol    Essential hypertension  Assessment & Plan  · Continue lisinopril    Benign prostatic hyperplasia with lower urinary tract symptoms  Assessment & Plan  · Continue Proscar and Flomax    VTE Prophylaxis: Apixaban (Eliquis)  Code Status: full code  Discussion with patient    Anticipated Length of Stay:  Patient will be admitted on an Observation basis with an anticipated length of stay of  < 2 midnights  Justification for Hospital Stay: Covid 19 treatment    Chief Complaint:   Weakness    History of Present Illness: Tyler Juarez is a 61 y o  male who presents with shortness breath  Patient with medical history of hypertension, hyperlipidemia asthma BPH presents ER secondary weakness  Patient reported weakness and fatigue for about 5 days  Went to [facility] in [facility] with no relief  He noted cough, diarrhea diagnosed with COVID 5 days ago  He has fevers that come and go  No shortness of breath noted    He had no leg swelling   "feel like I got hit by a truck"    ER treatment Tylenol 650mg, Decadron 6mg, 1 L IVF    Review of Systems:    Review of Systems   Constitutional: Positive for chills, fatigue and fever  HENT: Positive for rhinorrhea  Negative for sore throat and trouble swallowing  Eyes: Negative for photophobia  Respiratory: Positive for cough  Negative for shortness of breath  Cardiovascular: Negative for chest pain and leg swelling  Gastrointestinal: Positive for diarrhea and nausea  Negative for abdominal pain and vomiting  Genitourinary: Negative for dysuria and hematuria  Musculoskeletal: Positive for back pain, myalgias and neck pain  Skin: Negative for rash and wound  Neurological: Positive for weakness and headaches  Negative for dizziness  Psychiatric/Behavioral: Negative for agitation and confusion  Past Medical and Surgical History:     Past Medical History:   Diagnosis Date    Asthma     Enlarged prostate     Hyperlipidemia     Hypertension     Lumbar herniated disc        Past Surgical History:   Procedure Laterality Date    LUNG BIOPSY      PROSTATE BIOPSY         Meds/Allergies:    Prior to Admission medications    Medication Sig Start Date End Date Taking? Authorizing Provider   albuterol (PROVENTIL HFA,VENTOLIN HFA) 90 mcg/act inhaler Inhale 2 puffs every 6 (six) hours as needed for wheezing    Historical Provider, MD   apixaban (ELIQUIS) 5 mg 2 tablets twice a day until June 8, then on June 9 change to 1 tablet twice a day   7/26/17   Historical Provider, MD   budesonide-formoterol (SYMBICORT) 160-4 5 mcg/act inhaler Inhale 2 puffs    Historical Provider, MD   Cholecalciferol (VITAMIN D3 PO) Take 1 tablet by mouth daily    Historical Provider, MD   cyclobenzaprine (FLEXERIL) 10 mg tablet Take 10 mg by mouth    Historical Provider, MD   finasteride (PROSCAR) 5 mg tablet Take 1 tablet (5 mg total) by mouth daily 1/17/21   LIS Tucker   gabapentin (NEURONTIN) 300 mg capsule Take 300 mg by mouth 2 (two) times a day    Historical Provider, MD   lisinopril (ZESTRIL) 40 mg tablet Take 40 mg by mouth daily    Historical Provider, MD meloxicam (MOBIC) 7 5 mg tablet Take 7 5 mg by mouth daily    Historical Provider, MD   montelukast (SINGULAIR) 10 mg tablet  11/27/19   Historical Provider, MD   multivitamin (THERAGRAN) TABS Take 1 tablet by mouth daily    Historical Provider, MD   Omega-3 Fatty Acids (FISH OIL) 1200 MG CAPS Take 1 capsule by mouth 2 (two) times a day    Historical Provider, MD   ranitidine (ZANTAC) 150 mg tablet Take 150 mg by mouth 2 (two) times a day    Historical Provider, MD   Red Yeast Rice Extract (RED YEAST RICE PO) Take by mouth    Historical Provider, MD   tamsulosin (FLOMAX) 0 4 mg Take 1 capsule (0 4 mg total) by mouth 2 (two) times a day 11/3/20   Agustin Aguilar PA-C   traMADol (ULTRAM) 50 mg tablet Take 50 mg by mouth every 6 (six) hours as needed for moderate pain    Historical Provider, MD   Zolpidem Tartrate (AMBIEN PO) Take 10 mg by mouth daily at bedtime    Historical Provider, MD     all medications and allergies reviewed    Allergies: Allergies   Allergen Reactions    Percocet [Oxycodone-Acetaminophen] Itching       Social History:     Marital Status: /Civil Union   Occupation:   Patient Pre-hospital Living Situation:  Home  Patient Pre-hospital Level of Mobility:  Mobile  Patient Pre-hospital Diet Restrictions:  Gluten free  Substance Use History:   Social History     Substance and Sexual Activity   Alcohol Use Yes     Social History     Tobacco Use   Smoking Status Never Smoker   Smokeless Tobacco Never Used     Social History     Substance and Sexual Activity   Drug Use No       Family History:  I have reviewed the patient's family history    Physical Exam:     Vitals:   Blood Pressure: 121/66 (03/22/21 0300)  Pulse: 73 (03/22/21 0300)  Temperature: (!) 100 7 °F (38 2 °C) (03/21/21 2307)  Respirations: (!) 23 (03/22/21 0300)  Height: 6' (182 9 cm) (03/21/21 2307)  Weight - Scale: 120 kg (265 lb) (03/21/21 2307)  SpO2: 92 % (03/22/21 0300)    Physical Exam  Vitals signs reviewed  Constitutional:       Comments: Fatigued and ill-appearing   HENT:      Head: Normocephalic and atraumatic  Right Ear: External ear normal       Left Ear: External ear normal       Nose: Nose normal    Eyes:      General:         Right eye: No discharge  Left eye: No discharge  Conjunctiva/sclera: Conjunctivae normal    Neck:      Musculoskeletal: Normal range of motion  Cardiovascular:      Rate and Rhythm: Normal rate and regular rhythm  Heart sounds: Normal heart sounds  No murmur  Pulmonary:      Effort: Pulmonary effort is normal  Tachypnea present  No respiratory distress  Breath sounds: Examination of the right-lower field reveals decreased breath sounds  Examination of the left-lower field reveals decreased breath sounds  Decreased breath sounds present  No wheezing or rales  Abdominal:      General: Bowel sounds are normal  There is no distension  Palpations: Abdomen is soft  Tenderness: There is no abdominal tenderness  There is no guarding  Musculoskeletal: Normal range of motion  Skin:     General: Skin is warm and dry  Neurological:      Mental Status: He is alert and oriented to person, place, and time  Mental status is at baseline  Psychiatric:         Mood and Affect: Mood normal          Behavior: Behavior normal          Thought Content: Thought content normal          Judgment: Judgment normal          Additional Data:     Lab Results: I have personally reviewed pertinent reports        Results from last 7 days   Lab Units 03/21/21  2344   WBC Thousand/uL 5 00   HEMOGLOBIN g/dL 14 8   HEMATOCRIT % 43 3   PLATELETS Thousands/uL 152   NEUTROS PCT % 74   LYMPHS PCT % 15*   MONOS PCT % 11   EOS PCT % 0     Results from last 7 days   Lab Units 03/21/21  2344   SODIUM mmol/L 133*   POTASSIUM mmol/L 3 5   CHLORIDE mmol/L 99   CO2 mmol/L 23   BUN mg/dL 19   CREATININE mg/dL 0 89   ANION GAP mmol/L 11   CALCIUM mg/dL 8 9   ALBUMIN g/dL 4 1   TOTAL BILIRUBIN mg/dL 0 80   ALK PHOS U/L 30*   ALT U/L 36   AST U/L 48*   GLUCOSE RANDOM mg/dL 120*     Imaging: Formal read pending  XR chest 1 view portable    (Results Pending)     [facility] Everywhere Records Reviewed: Yes    ** Please Note: This note has been constructed using a voice recognition system   **

## 2021-03-22 NOTE — ASSESSMENT & PLAN NOTE
· Start COVID treatment moderate pathway patient currently on 4 L  · IV antibiotics, doxycycline, steroids, remdesivir, vitamin-C, vitamin-D  · Respiratory protocol  · Follow-up serial labs

## 2021-03-22 NOTE — PLAN OF CARE
Problem: Potential for Falls  Goal: Patient will remain free of falls  Description: INTERVENTIONS:  - Assess patient frequently for physical needs  -  Identify cognitive and physical deficits and behaviors that affect risk of falls    -  [place] fall precautions as indicated by assessment   - Educate patient/family on patient safety including physical limitations  - Instruct patient to call for assistance with activity based on assessment  - Modify environment to reduce risk of injury  - Consider OT/PT consult to assist with strengthening/mobility  Outcome: Progressing     Problem: PAIN - ADULT  Goal: Verbalizes/displays adequate comfort level or baseline comfort level  Description: Interventions:  - Encourage patient to monitor pain and request assistance  - Assess pain using appropriate pain scale  - Administer analgesics based on type and severity of pain and evaluate response  - Implement non-pharmacological measures as appropriate and evaluate response  - Consider cultural and social influences on pain and pain management  - Notify physician/advanced practitioner if interventions unsuccessful or patient reports new pain  Outcome: Progressing     Problem: INFECTION - ADULT  Goal: Absence or prevention of progression during hospitalization  Description: INTERVENTIONS:  - Assess and monitor for signs and symptoms of infection  - Monitor lab/diagnostic results  - Monitor all insertion sites, i e  indwelling lines, tubes, and drains  - Monitor endotracheal if appropriate and nasal secretions for changes in amount and color  - [place] appropriate cooling/warming therapies per order  - Administer medications as ordered  - Instruct and encourage patient and family to use good hand hygiene technique  - Identify and instruct in appropriate isolation precautions for identified infection/condition  Outcome: Progressing  Goal: Absence of fever/infection during neutropenic period  Description: INTERVENTIONS:  - Monitor WBC    Outcome: Progressing     Problem: SAFETY ADULT  Goal: Maintain or return to baseline ADL function  Description: INTERVENTIONS:  -  Assess patient's ability to carry out ADLs; assess patient's baseline for ADL function and identify physical deficits which impact ability to perform ADLs (bathing, care of mouth/teeth, toileting, grooming, dressing, etc )  - Assess/evaluate cause of self-care deficits   - Assess range of motion  - Assess patient's mobility; develop plan if impaired  - Assess patient's need for assistive devices and provide as appropriate  - Encourage maximum independence but intervene and supervise when necessary  - Involve family in performance of ADLs  - Assess for home care needs following discharge   - Consider OT consult to assist with ADL evaluation and planning for discharge  - Provide patient education as appropriate  Outcome: Progressing  Goal: Maintain or return mobility status to optimal level  Description: INTERVENTIONS:  - Assess patient's baseline mobility status (ambulation, transfers, stairs, etc )    - Identify cognitive and physical deficits and behaviors that affect mobility  - Identify mobility aids required to assist with transfers and/or ambulation (gait belt, sit-to-stand, lift, walker, cane, etc )  - [place] fall precautions as indicated by assessment  - Record patient progress and toleration of activity level on Mobility SBAR; progress patient to next Phase/Stage  - Instruct patient to call for assistance with activity based on assessment  - Consider rehabilitation consult to assist with strengthening/weightbearing, etc   Outcome: Progressing     Problem: DISCHARGE PLANNING  Goal: Discharge to home or other facility with appropriate resources  Description: INTERVENTIONS:  - Identify barriers to discharge w/patient and caregiver  - Arrange for needed discharge resources and transportation as appropriate  - Identify discharge learning needs (meds, wound care, etc )  - Arrange for interpretive services to assist at discharge as needed  - Refer to Case Management Department for coordinating discharge planning if the patient needs post-hospital services based on physician/advanced practitioner order or complex needs related to functional status, cognitive ability, or social support system  Outcome: Progressing     Problem: Knowledge Deficit  Goal: Patient/family/caregiver demonstrates understanding of disease process, treatment plan, medications, and discharge instructions  Description: Complete learning assessment and assess knowledge base  Interventions:  - Provide teaching at level of understanding  - Provide teaching via preferred learning methods  Outcome: Progressing     Problem: Nutrition/Hydration-ADULT  Goal: Nutrient/Hydration intake appropriate for improving, restoring or maintaining nutritional needs  Description: Monitor and assess patient's nutrition/hydration status for malnutrition  Collaborate with interdisciplinary team and initiate plan and interventions as ordered  Monitor patient's weight and dietary intake as ordered or per policy  Utilize nutrition screening tool and intervene as necessary  Determine patient's food preferences and provide high-protein, high-caloric foods as appropriate       INTERVENTIONS:  - Monitor oral intake, urinary output, labs, and treatment plans  - Assess nutrition and hydration status and recommend course of action  - Evaluate amount of meals eaten  - Assist patient with eating if necessary   - Allow adequate time for meals  - Recommend/ encourage appropriate diets, oral nutritional supplements, and vitamin/mineral supplements  - Order, calculate, and assess calorie counts as needed  - Recommend, monitor, and adjust tube feedings and TPN/PPN based on assessed needs  - Assess need for intravenous fluids  - Provide specific nutrition/hydration education as appropriate  - Include patient/family/caregiver in decisions related to nutrition  Outcome: Progressing

## 2021-03-22 NOTE — UTILIZATION REVIEW
Initial Clinical Review  Observation 3/22/21 @ 0113, converted to inpatient admission 3/22/21 @ 1556 for continued care & tx for sepsis 2nd COVID 19    Per MD 3/22:  Tmax 100 7  remains on COVID 10 med/tx pathway  Persistent LEMA, lungs with diminished breath sounds, dry, NPC, O2 requirements continue @ 4ltr via nc at this time  3/23:  Acute respiratory failure 2nd COVID pneumonia  Persistent dyspnea on exertion, lungs with decreased breath sounds, O2 remains @ 4ltr nc  ABT d/c'd as procalcitonin neg x2  Remains on COVID med/tx pathway, currently refusing convalescent plasma at this time  /77 (BP Location: Right arm)   Pulse 69   Temp 98 5 °F (36 9 °C) (Tympanic)   Resp 16   Ht 6' (1 829 m)   Wt 121 kg (267 lb 3 2 oz)   SpO2 93%   BMI 36 24 kg/m²     Admission: Date/Time/Statement:   Admission Orders (From admission, onward)     Ordered        03/22/21 1556  Inpatient Admission  Once                Orders Placed This Encounter   Procedures    Inpatient Admission     Standing Status:   Standing     Number of Occurrences:   1     Order Specific Question:   Level of Care     Answer:   Med Surg [16]     Order Specific Question:   Estimated length of stay     Answer:   More than 2 Midnights     Order Specific Question:   Certification     Answer:   I certify that inpatient services are medically necessary for this patient for a duration of greater than two midnights  See H&P and MD Progress Notes for additional information about the patient's course of treatment  ED Arrival Information     Expected Arrival Acuity Means of Arrival Escorted By Service Admission Type    3/21/2021 22:08 3/21/2021 22:56 Urgent Ambulance [facility] Hospitalist Urgent    Arrival Complaint    Covid+        Chief Complaint   Patient presents with    Weakness - Generalized     COVID (+) weakness and confusion as per wife   O2 sat good on room air, no pain as per PT     Assessment/Plan:   61 y o  male who presents with shortness breath  Patient with medical history of hypertension, hyperlipidemia asthma BPH presents ER secondary weakness  Patient reported weakness and fatigue for about 5 days  Went to [facility] in [place]  Tried Dayquil with no relief  He noted cough, diarrhea diagnosed with COVID 5 days ago  He has fevers that come and go  No shortness of breath noted  He had no leg swelling   "feel like I got hit by a truck"      ED Triage Vitals   Temperature Pulse Respirations Blood Pressure SpO2   03/21/21 2307 03/21/21 2307 03/21/21 2307 03/21/21 2307 03/21/21 2307   (!) 100 7 °F (38 2 °C) 84 22 147/73 95 %      Temp Source Heart Rate Source Patient Position - Orthostatic VS BP Location FiO2 (%)   03/22/21 0310 03/21/21 2307 03/21/21 2307 03/21/21 2307 --   Temporal Monitor Lying Left arm       Pain Score       03/22/21 0030       Med Not Given for Pain - for MAR use only          Wt Readings from Last 1 Encounters:   03/22/21 121 kg (267 lb 3 2 oz)     Additional Vital Signs:   Date/Time  Temp  Pulse  Resp  BP  MAP (mmHg)  SpO2  Calculated FIO2 (%) - Nasal Cannula  Nasal Cannula O2 Flow Rate (L/min)  O2 Device  Patient Position - Orthostatic VS   03/23/21 0752  98 5 °F (36 9 °C)  69  16  123/77  --  93 %  36  4 L/min  Nasal cannula  Lying       Date/Time  Temp  Pulse  Resp  BP  MAP (mmHg)  SpO2  Calculated FIO2 (%) - Nasal Cannula  Nasal Cannula O2 Flow Rate (L/min)  O2 Device  Patient Position - Orthostatic VS   03/22/21 0743  97 7 °F (36 5 °C)  73  16  124/78  --  97 %  36  4 L/min  Nasal cannula  Sitting   03/22/21 0330  --  --  --  --  --  94 %  36  4 L/min  Nasal cannula  --   03/22/21 0310  96 5 °F (35 8 °C)  Abnormal   74  20  140/78  --  95 %  36  4 L/min  Nasal cannula  Lying   03/22/21 0300  --  73  23  Abnormal   121/66  88  92 %  36  4 L/min  Nasal cannula  --   03/22/21 0200  --  76  13  124/69  91  92 %  36  4 L/min  Nasal cannula  --   03/22/21 0130  --  78  25  Abnormal   127/66  90  88 %  Abnormal   36  4 L/min  Nasal cannula  --   03/22/21 0030  --  86  21  --  --  91 %  --  --  --  --   03/21/21 2315  --  85  22  147/73  104  92 %  --  --  --  --   03/21/21 2307  100 7 °F (38 2 °C)  Abnormal   84  22  147/73  --  95 %  --  --  None (Room air)  Lying       Pertinent Labs/Diagnostic Test Results:   Results from last 7 days   Lab Units 03/21/21  2356   SARS-COV-2  Positive*     Results from last 7 days   Lab Units 03/23/21  0456 03/21/21  2344   WBC Thousand/uL 6 20 5 00   HEMOGLOBIN g/dL 13 1* 14 8   HEMATOCRIT % 38 7* 43 3   PLATELETS Thousands/uL 153 152   NEUTROS ABS Thousands/µL  --  3 70     Results from last 7 days   Lab Units 03/23/21  0456 03/22/21  0439 03/21/21  2344   SODIUM mmol/L 138 136 133*   POTASSIUM mmol/L 4 0 3 3* 3 5   CHLORIDE mmol/L 106 102 99   CO2 mmol/L 23 22 23   ANION GAP mmol/L 9 12 11   BUN mg/dL 22 17 19   CREATININE mg/dL 0 90 0 78 0 89   EGFR ml/min/1 73sq m 93 98 93   CALCIUM mg/dL 8 4* 8 4* 8 9   MAGNESIUM mg/dL  --   --  2 1     Results from last 7 days   Lab Units 03/23/21  0456 03/22/21  0439 03/21/21  2344   AST U/L 58* 47* 48*   ALT U/L 50 34 36   ALK PHOS U/L 25* 29* 30*   TOTAL PROTEIN g/dL 6 4 7 0 7 5   ALBUMIN g/dL 3 4* 3 8 4 1   TOTAL BILIRUBIN mg/dL 0 50 0 70 0 80     Results from last 7 days   Lab Units 03/23/21  0456 03/22/21  0439 03/21/21  2344   GLUCOSE RANDOM mg/dL 103* 112* 120*     Results from last 7 days   Lab Units 03/21/21  2344   TROPONIN I ng/mL <0 03     Results from last 7 days   Lab Units 03/23/21  0456 03/22/21  0439   D-DIMER QUANTITATIVE ug/ml FEU 0 50* 0 48     Results from last 7 days   Lab Units 03/21/21  2344   BNP pg/mL 46     Results from last 7 days   Lab Units 03/23/21  0456 03/21/21  2344   CRP mg/L 23 2* 33 0*     Results from last 7 days   Lab Units 03/22/21  0456   CLARITY UA  Clear   COLOR UA  Yellow   SPEC GRAV UA  1 020   PH UA  6 0   GLUCOSE UA mg/dl Negative   KETONES UA mg/dl Negative   BLOOD UA  Trace-Intact* PROTEIN UA mg/dl Negative   NITRITE UA  Negative   BILIRUBIN UA  Negative   UROBILINOGEN UA E U /dl 0 2   LEUKOCYTES UA  Negative   WBC UA /hpf 1-2   RBC UA /hpf 1-2   BACTERIA UA /hpf None Seen   EPITHELIAL CELLS WET PREP /hpf Occasional   MUCUS THREADS  Moderate*     Results from last 7 days   Lab Units 03/21/21  2356   INFLUENZA A PCR  Negative   INFLUENZA B PCR  Negative   RSV PCR  Negative       ED Treatment:   Medication Administration from 03/21/2021 2256 to 03/22/2021 0321       Date/Time Order Dose Route Action     03/22/2021 0030 sodium chloride 0 9 % bolus 1,000 mL 1,000 mL Intravenous New Bag     03/22/2021 0030 acetaminophen (TYLENOL) tablet 650 mg 650 mg Oral Given        Past Medical History:   Diagnosis Date    Arthritis     Asthma     Enlarged prostate     Hyperlipidemia     Hypertension     Lumbar herniated disc      Present on Admission:   Benign prostatic hyperplasia with lower urinary tract symptoms   Sepsis due to COVID-19 ([facility] [address] )   Pneumonia due to COVID-19 virus   Essential hypertension   Moderate asthma without complication    Admitting Diagnosis: COVID-19 [U07 1]  Age/Sex: 61 y o  male  Admission Orders:  Contact & airborne isolation  Scd/foot pumps  O2 to keep sats>90%  Self-proning    Scheduled Medications:  apixaban, 5 mg, Oral, BID  ascorbic acid, 1,000 mg, Oral, Q12H KAMI  budesonide-formoterol, 2 puff, Inhalation, BID  cefTRIAXone, 1,000 mg, Intravenous, Q24H>d/c'd  cholecalciferol, 2,000 Units, Oral, Daily  [START ON 3/23/2021] dexamethasone, 6 mg, Intravenous, Q24H  doxycycline hyclate, 100 mg, Oral, Q12H>d/c'd  famotidine, 20 mg, Oral, BID  finasteride, 5 mg, Oral, Daily  fish oil, 1,000 mg, Oral, BID  gabapentin, 300 mg, Oral, HS  lisinopril, 40 mg, Oral, Daily  zinc sulfate, 220 mg, Oral, Daily    Followed by  Kadie Cummins ON 3/29/2021] multivitamin-minerals, 1 tablet, Oral, Daily  [START ON 3/23/2021] remdesivir, 100 mg, Intravenous, Q24H  remdesivir, 200 mg, Intravenous, Q24H  tamsulosin, 0 4 mg, Oral, BID    Continuous IV Infusions:  sodium chloride, 100 mL/hr, Intravenous, Continuous    PRN Meds:  acetaminophen, 650 mg, Oral, Q4H PRN  albuterol, 2 puff, Inhalation, Q6H PRN  ondansetron, 4 mg, Intravenous, Q6H PRN    Network Utilization Review Department  ATTENTION: Please call with any questions or concerns to [phone] and carefully listen to the prompts so that you are directed to the right person  All voicemails are confidential   Ro[facility] Wong all requests for admission clinical reviews, approved or denied determinations and any other requests to dedicated fax number below belonging to the campus where the patient is receiving treatment   List of dedicated fax numbers for the Facilities:  [address] DENIALS (Administrative/Medical Necessity) [phone]   [address] (Maternity/NICU/Pediatrics) [phone]   [address] Anabela Walters 7972 (  [address] Zun[address]) [address] [place] [address] [address] [place] [address] [phone]

## 2021-03-22 NOTE — CASE MANAGEMENT
TC to pt room to obtain information  Pt was admitted to the hospital for COVID+  Explained the role of CM and the option of discharge planning with the pt  Pt states he lives with his wife in a split level home  Pt comes through the garage into the family room with 7 SIDNEY to the main floor and 7 steps up to the bedroom  Pt was independent and able to drive  Pt PCP is Dr Braden Leggett  Pt gets his medications at [facility]  Pt has no DME  Wife will transport home upon discharge  Pt does not wear oxygen at home  Patient/caregiver received discharge checklist   Content reviewed  Patient/caregiver encouraged to participate in discharge plan of care prior to discharge home  CM reviewed d/c planning process including the following: identifying help at home, patient preference for d/c planning needs, availability of treatment team to discuss questions or concerns patient and/or family may have regarding understanding medications and recognizing signs and symptoms once discharged  CM also encouraged patient to follow up with all recommended appointments after discharge  Patient advised of importance for patient and family to participate in managing patients medical well being

## 2021-03-22 NOTE — ED PROVIDER NOTES
History  Chief Complaint   Patient presents with    Weakness - Generalized     COVID (+) weakness and confusion as per wife  O2 sat good on room air, no pain as per PT     Generalized weakness and run down sensation for about 5 days  He has cough, diarrhea and is covid positive dx 5 days ago  He has fever that coems and goes  He has pulse ox at home and reported this is 93 range  No sob, cp, leg swelling  No uti sx  denies cardiac hx  He states his wife said he was confused, in er he does not feel confused he states and appears appropriate  Prior to Admission Medications   Prescriptions Last Dose Informant Patient Reported? Taking? Cholecalciferol (VITAMIN D3 PO) 3/21/2021 at Unknown time  Yes Yes   Sig: Take 1 tablet by mouth daily   Omega-3 Fatty Acids (FISH OIL) 1200 MG CAPS 3/21/2021 at Unknown time  Yes Yes   Sig: Take 1 capsule by mouth 2 (two) times a day   Red Yeast Rice Extract (RED YEAST RICE PO) Not Taking at Unknown time  Yes No   Sig: Take by mouth   albuterol (PROVENTIL HFA,VENTOLIN HFA) 90 mcg/act inhaler 3/21/2021 at Unknown time  Yes Yes   Sig: Inhale 2 puffs every 6 (six) hours as needed for wheezing   apixaban (ELIQUIS) 5 mg 3/21/2021 at Unknown time  Yes Yes   Si tablets twice a day until , then on  change to 1 tablet twice a day     budesonide-formoterol (SYMBICORT) 160-4 5 mcg/act inhaler 3/21/2021 at Unknown time  Yes Yes   Sig: Inhale 2 puffs   finasteride (PROSCAR) 5 mg tablet 3/21/2021 at Unknown time  No Yes   Sig: Take 1 tablet (5 mg total) by mouth daily   gabapentin (NEURONTIN) 300 mg capsule 3/21/2021 at Unknown time  Yes Yes   Sig: Take 300 mg by mouth 2 (two) times a day   lisinopril (ZESTRIL) 40 mg tablet 3/21/2021 at Unknown time  Yes Yes   Sig: Take 40 mg by mouth daily   multivitamin (THERAGRAN) TABS 3/21/2021 at Unknown time  Yes Yes   Sig: Take 1 tablet by mouth daily   ranitidine (ZANTAC) 150 mg tablet Not Taking at Unknown time  Yes No   Sig: Take 150 mg by mouth 2 (two) times a day   tamsulosin (FLOMAX) 0 4 mg 3/21/2021 at Unknown time  No Yes   Sig: Take 1 capsule (0 4 mg total) by mouth 2 (two) times a day      Facility-Administered Medications: None       Past Medical History:   Diagnosis Date    Arthritis     Asthma     Enlarged prostate     Hyperlipidemia     Hypertension     Lumbar herniated disc        Past Surgical History:   Procedure Laterality Date    LUNG BIOPSY      PROSTATE BIOPSY         Family History   Problem Relation Age of Onset    Alzheimer's disease Mother     Kidney disease Father     Diabetes Father     Alzheimer's disease Father      I have reviewed and agree with the history as documented  E-Cigarette/Vaping    E-Cigarette Use Never User      E-Cigarette/Vaping Substances     Social History     Tobacco Use    Smoking status: Never Smoker    Smokeless tobacco: Never Used   Substance Use Topics    Alcohol use: Never     Frequency: Never    Drug use: Never       Review of Systems   All other systems reviewed and are negative  Physical Exam  Physical Exam  Constitutional:       General: He is not in acute distress  Appearance: He is not ill-appearing, toxic-appearing or diaphoretic  HENT:      Head: Normocephalic and atraumatic  Right Ear: External ear normal       Left Ear: External ear normal       Nose: Nose normal       Mouth/Throat:      Mouth: Mucous membranes are moist       Pharynx: No oropharyngeal exudate  Eyes:      General:         Right eye: No discharge  Left eye: No discharge  Conjunctiva/sclera: Conjunctivae normal       Pupils: Pupils are equal, round, and reactive to light  Neck:      Musculoskeletal: Normal range of motion and neck supple  Vascular: No JVD  Trachea: No tracheal deviation  Cardiovascular:      Rate and Rhythm: Normal rate and regular rhythm  Pulses: Normal pulses  Heart sounds: Normal heart sounds  No murmur  No friction rub   No gallop  Pulmonary:      Effort: Pulmonary effort is normal  No respiratory distress  Breath sounds: No stridor  Rhonchi present  No wheezing or rales  Chest:      Chest wall: No tenderness  Abdominal:      General: Bowel sounds are normal  There is no distension  Palpations: Abdomen is soft  There is no mass  Tenderness: There is no abdominal tenderness  There is no guarding or rebound  Hernia: No hernia is present  Musculoskeletal: Normal range of motion  General: No swelling, tenderness, deformity or signs of injury  Right lower leg: No edema  Left lower leg: No edema  Skin:     General: Skin is warm and dry  Capillary Refill: Capillary refill takes less than 2 seconds  Coloration: Skin is not pale  Findings: No rash  Neurological:      General: No focal deficit present  Mental Status: He is alert and oriented to person, place, and time  Sensory: No sensory deficit  Motor: No weakness or abnormal muscle tone        Deep Tendon Reflexes: Reflexes normal    Psychiatric:         Mood and Affect: Mood normal          Vital Signs  ED Triage Vitals   Temperature Pulse Respirations Blood Pressure SpO2   03/21/21 2307 03/21/21 2307 03/21/21 2307 03/21/21 2307 03/21/21 2307   (!) 100 7 °F (38 2 °C) 84 22 147/73 95 %      Temp Source Heart Rate Source Patient Position - Orthostatic VS BP Location FiO2 (%)   03/22/21 0310 03/21/21 2307 03/21/21 2307 03/21/21 2307 --   Temporal Monitor Lying Left arm       Pain Score       03/22/21 0030       Med Not Given for Pain - for MAR use only           Vitals:    03/22/21 0300 03/22/21 0310 03/22/21 0743 03/22/21 1500   BP: 121/66 140/78 124/78 162/79   Pulse: 73 74 73 80   Patient Position - Orthostatic VS:  Lying Sitting Lying         Visual Acuity      ED Medications  Medications   acetaminophen (TYLENOL) tablet 650 mg (has no administration in time range)   ondansetron (ZOFRAN) injection 4 mg (has no administration in time range)   sodium chloride 0 9 % infusion (100 mL/hr Intravenous New Bag 3/22/21 1704)   cholecalciferol (VITAMIN D3) tablet 2,000 Units (2,000 Units Oral Given 3/22/21 0851)   ascorbic acid (VITAMIN C) tablet 1,000 mg (1,000 mg Oral Given 3/22/21 0852)   zinc sulfate (ZINCATE) capsule 220 mg (220 mg Oral Given 3/22/21 0851)     Followed by   multivitamin-minerals (CENTRUM ADULTS) tablet 1 tablet (has no administration in time range)   dexamethasone (DECADRON) injection 6 mg (has no administration in time range)   famotidine (PEPCID) tablet 20 mg (20 mg Oral Given 3/22/21 1700)   cefTRIAXone (ROCEPHIN) IVPB (premix in dextrose) 1,000 mg 50 mL (1,000 mg Intravenous New Bag 3/22/21 0350)   apixaban (ELIQUIS) tablet 5 mg (5 mg Oral Given 3/22/21 1700)   budesonide-formoterol (SYMBICORT) 160-4 5 mcg/act inhaler 2 puff (2 puffs Inhalation Given 3/22/21 1700)   finasteride (PROSCAR) tablet 5 mg (5 mg Oral Given 3/22/21 0851)   gabapentin (NEURONTIN) capsule 300 mg (300 mg Oral Given 3/22/21 0354)   lisinopril (ZESTRIL) tablet 40 mg (40 mg Oral Given 3/22/21 0851)   fish oil capsule 1,000 mg (1,000 mg Oral Given 3/22/21 1700)   tamsulosin (FLOMAX) capsule 0 4 mg (0 4 mg Oral Given 3/22/21 1700)   albuterol (PROVENTIL HFA,VENTOLIN HFA) inhaler 2 puff (has no administration in time range)   doxycycline hyclate (VIBRAMYCIN) capsule 100 mg (100 mg Oral Given 3/22/21 0851)   remdesivir Karol Celestin) 100 mg in sodium chloride 0 9 % 250 mL IVPB (has no administration in time range)   sodium chloride 0 9 % bolus 1,000 mL (0 mL Intravenous Stopped 3/22/21 0302)   acetaminophen (TYLENOL) tablet 650 mg (650 mg Oral Given 3/22/21 0030)   dexamethasone (DECADRON) injection 6 mg (6 mg Intravenous Given 3/22/21 0351)   remdesivir (Veklury) 200 mg in sodium chloride 0 9 % 250 mL IVPB (200 mg Intravenous New Bag 3/22/21 2388)   potassium chloride (K-DUR,KLOR-CON) CR tablet 40 mEq (40 mEq Oral Given 3/22/21 1214) Diagnostic Studies  Results Reviewed     Procedure Component Value Units Date/Time    Procalcitonin with AM Reflex [[id number]]  (Normal) Collected: 03/22/21 0439    Lab Status: Final result Specimen: Blood from Arm, Right Updated: 03/22/21 1303     Procalcitonin <0 05 ng/ml     Ferritin [[id number]]  (Abnormal) Collected: 03/21/21 2344    Lab Status: Final result Specimen: Blood from Arm, Right Updated: 03/22/21 1219     Ferritin 628 ng/mL     UA w Reflex to Microscopic w Reflex to Culture [[id number]]  (Abnormal) Collected: 03/22/21 0456    Lab Status: Final result Specimen: Urine, Clean Catch Updated: 03/22/21 0636     Color, UA Yellow     Clarity, UA Clear     Specific [place], UA 1 020     pH, UA 6 0     Leukocytes, UA Negative     Nitrite, UA Negative     Protein, UA Negative mg/dl      Glucose, UA Negative mg/dl      Ketones, UA Negative mg/dl      Urobilinogen, UA 0 2 E U /dl      Bilirubin, UA Negative     Blood, UA Trace-Intact    Comprehensive metabolic panel [[id number]]  (Abnormal) Collected: 03/22/21 0439    Lab Status: Final result Specimen: Blood from Arm, Right Updated: 03/22/21 0629     Sodium 136 mmol/L      Potassium 3 3 mmol/L      Chloride 102 mmol/L      CO2 22 mmol/L      ANION GAP 12 mmol/L      BUN 17 mg/dL      Creatinine 0 78 mg/dL      Glucose 112 mg/dL      Calcium 8 4 mg/dL      AST 47 U/L      ALT 34 U/L      Alkaline Phosphatase 29 U/L      Total Protein 7 0 g/dL      Albumin 3 8 g/dL      Total Bilirubin 0 70 mg/dL      eGFR 98 ml/min/1 73sq m     Narrative:      [place] guidelines for Chronic Kidney Disease (CKD):     Stage 1 with normal or high GFR (GFR > 90 mL/min/1 73 square meters)    Stage 2 Mild CKD (GFR = 60-89 mL/min/1 73 square meters)    Stage 3A Moderate CKD (GFR = 45-59 mL/min/1 73 square meters)    Stage 3B Moderate CKD (GFR = 30-44 mL/min/1 73 square meters)    Stage 4 Severe CKD (GFR = 15-29 mL/min/1 73 square meters)    Stage 5 End Stage CKD (GFR <15 mL/min/1 73 square meters)  Note: GFR calculation is accurate only with a steady state creatinine    D-dimer, quantitative [[id number]]  (Normal) Collected: 03/22/21 0439    Lab Status: Final result Specimen: Blood from Arm, Right Updated: 03/22/21 0621     D-Dimer, Quant 0 48 ug/ml FEU     C-reactive protein [[id number]]  (Abnormal) Collected: 03/21/21 2344    Lab Status: Final result Specimen: Blood from Arm, Right Updated: 03/22/21 0212     CRP 33 0 mg/L     COVID19, Influenza A/B, RSV PCR, [facility] [[id number]]  (Abnormal) Collected: 03/21/21 2356    Lab Status: Final result Specimen: Nares from Nasopharyngeal Swab Updated: 03/22/21 0100     SARS-CoV-2 Positive     INFLUENZA A PCR Negative     INFLUENZA B PCR Negative     RSV PCR Negative    Narrative: This test has been authorized by FDA under an EUA (Emergency Use Assay) for use by authorized laboratories  Clinical caution and judgement should be used with the interpretation of these results with consideration of the clinical impression and other laboratory testing  Testing reported as "Positive" or "Negative" has been proven to be accurate according to standard laboratory validation requirements  All testing is performed with control materials showing appropriate reactivity at standard intervals      Comprehensive metabolic panel [[id number]]  (Abnormal) Collected: 03/21/21 2344    Lab Status: Final result Specimen: Blood from Arm, Right Updated: 03/22/21 0021     Sodium 133 mmol/L      Potassium 3 5 mmol/L      Chloride 99 mmol/L      CO2 23 mmol/L      ANION GAP 11 mmol/L      BUN 19 mg/dL      Creatinine 0 89 mg/dL      Glucose 120 mg/dL      Calcium 8 9 mg/dL      AST 48 U/L      ALT 36 U/L      Alkaline Phosphatase 30 U/L      Total Protein 7 5 g/dL      Albumin 4 1 g/dL      Total Bilirubin 0 80 mg/dL      eGFR 93 ml/min/1 73sq m     Narrative:      [place] guidelines for Chronic Kidney Disease (CKD):    Stage 1 with normal or high GFR (GFR > 90 mL/min/1 73 square meters)    Stage 2 Mild CKD (GFR = 60-89 mL/min/1 73 square meters)    Stage 3A Moderate CKD (GFR = 45-59 mL/min/1 73 square meters)    Stage 3B Moderate CKD (GFR = 30-44 mL/min/1 73 square meters)    Stage 4 Severe CKD (GFR = 15-29 mL/min/1 73 square meters)    Stage 5 End Stage CKD (GFR <15 mL/min/1 73 square meters)  Note: GFR calculation is accurate only with a steady state creatinine    B-Type Natriuretic Peptide [facility] ONLY) [[id number]]  (Normal) Collected: 03/21/21 2344    Lab Status: Final result Specimen: Blood from Arm, Right Updated: 03/22/21 0015     BNP 46 pg/mL     Magnesium [[id number]]  (Normal) Collected: 03/21/21 2344    Lab Status: Final result Specimen: Blood from Arm, Right Updated: 03/22/21 0011     Magnesium 2 1 mg/dL     Troponin I [[id number]]  (Normal) Collected: 03/21/21 2344    Lab Status: Final result Specimen: Blood from Arm, Right Updated: 03/22/21 0011     Troponin I <0 03 ng/mL     CBC and differential [[id number]]  (Abnormal) Collected: 03/21/21 2344    Lab Status: Final result Specimen: Blood from Arm, Right Updated: 03/22/21 0001     WBC 5 00 Thousand/uL      RBC 4 67 Million/uL      Hemoglobin 14 8 g/dL      Hematocrit 43 3 %      MCV 93 fL      MCH 31 7 pg      MCHC 34 1 g/dL      RDW 13 9 %      MPV 9 0 fL      Platelets 099 Thousands/uL      Neutrophils Relative 74 %      Lymphocytes Relative 15 %      Monocytes Relative 11 %      Eosinophils Relative 0 %      Basophils Relative 0 %      Neutrophils Absolute 3 70 Thousands/µL      Lymphocytes Absolute 0 80 Thousands/µL      Monocytes Absolute 0 50 Thousand/µL      Eosinophils Absolute 0 00 Thousand/µL      Basophils Absolute 0 00 Thousands/µL                  XR chest 1 view portable   Final Result by Rosa Lawrence MD (03/22 0950)      Patchy airspace opacities bilaterally consistent with the patient's clinical history                    Workstation performed: [id number]                    Procedures  Procedures         ED Course                                           MDM  Number of Diagnoses or Management Options  Pneumonia due to COVID-19 virus:   Diagnosis management comments: covid positive, to er with generalized weakness  Labs are well  cxrt likely with covid pna  At arrival was satting delisa, on repeat eval sats 88% on room air  On 4 l nc with sat of 92  I will plan to admit for covid pna and O2 requirements  Discussed with dayami who accepted      ekg- nsr at  85, no obvious st deviation, non specific t wave, normal axis and intervals  Disposition  Final diagnoses:   Pneumonia due to COVID-19 virus     Time reflects when diagnosis was documented in both MDM as applicable and the Disposition within this note     Time User Action Codes Description Comment    3/22/2021  4:46 AM Meek Hughes Add [U07 1,  J12 82] Pneumonia due to COVID-19 virus       ED Disposition     ED Disposition Condition Date/Time Comment    Admit Stable Mon Mar 22, 2021  1:13 AM Case was discussed with dayami  and the patient's admission status was agreed to be Admission Status: observation status to the service of Dr Marry Chang   Follow-up Information    None         Current Discharge Medication List      CONTINUE these medications which have NOT CHANGED    Details   albuterol (PROVENTIL HFA,VENTOLIN HFA) 90 mcg/act inhaler Inhale 2 puffs every 6 (six) hours as needed for wheezing      apixaban (ELIQUIS) 5 mg 2 tablets twice a day until June 8, then on June 9 change to 1 tablet twice a day        budesonide-formoterol (SYMBICORT) 160-4 5 mcg/act inhaler Inhale 2 puffs      Cholecalciferol (VITAMIN D3 PO) Take 1 tablet by mouth daily      finasteride (PROSCAR) 5 mg tablet Take 1 tablet (5 mg total) by mouth daily  Qty: 90 tablet, Refills: 0    Associated Diagnoses: Benign prostatic hyperplasia with lower urinary tract symptoms, symptom details unspecified gabapentin (NEURONTIN) 300 mg capsule Take 300 mg by mouth 2 (two) times a day      lisinopril (ZESTRIL) 40 mg tablet Take 40 mg by mouth daily      multivitamin (THERAGRAN) TABS Take 1 tablet by mouth daily      Omega-3 Fatty Acids (FISH OIL) 1200 MG CAPS Take 1 capsule by mouth 2 (two) times a day      tamsulosin (FLOMAX) 0 4 mg Take 1 capsule (0 4 mg total) by mouth 2 (two) times a day  Qty: 180 capsule, Refills: 1    Associated Diagnoses: Benign prostatic hyperplasia with lower urinary tract symptoms, symptom details unspecified      ranitidine (ZANTAC) 150 mg tablet Take 150 mg by mouth 2 (two) times a day      Red Yeast Rice Extract (RED YEAST RICE PO) Take by mouth           No discharge procedures on file      PDMP Review     None          ED Provider  Electronically Signed by           Misty Hill MD  03/22/21 1482

## 2021-03-22 NOTE — PLAN OF CARE
Problem: Potential for Falls  Goal: Patient will remain free of falls  Description: INTERVENTIONS:  - Assess patient frequently for physical needs  -  Identify cognitive and physical deficits and behaviors that affect risk of falls    -  [place] fall precautions as indicated by assessment   - Educate patient/family on patient safety including physical limitations  - Instruct patient to call for assistance with activity based on assessment  - Modify environment to reduce risk of injury  - Consider OT/PT consult to assist with strengthening/mobility  Outcome: Progressing     Problem: PAIN - ADULT  Goal: Verbalizes/displays adequate comfort level or baseline comfort level  Description: Interventions:  - Encourage patient to monitor pain and request assistance  - Assess pain using appropriate pain scale  - Administer analgesics based on type and severity of pain and evaluate response  - Implement non-pharmacological measures as appropriate and evaluate response  - Consider cultural and social influences on pain and pain management  - Notify physician/advanced practitioner if interventions unsuccessful or patient reports new pain  Outcome: Progressing     Problem: INFECTION - ADULT  Goal: Absence or prevention of progression during hospitalization  Description: INTERVENTIONS:  - Assess and monitor for signs and symptoms of infection  - Monitor lab/diagnostic results  - Monitor all insertion sites, i e  indwelling lines, tubes, and drains  - Monitor endotracheal if appropriate and nasal secretions for changes in amount and color  - [place] appropriate cooling/warming therapies per order  - Administer medications as ordered  - Instruct and encourage patient and family to use good hand hygiene technique  - Identify and instruct in appropriate isolation precautions for identified infection/condition  Outcome: Progressing  Goal: Absence of fever/infection during neutropenic period  Description: INTERVENTIONS:  - Monitor WBC    Outcome: Progressing     Problem: SAFETY ADULT  Goal: Maintain or return to baseline ADL function  Description: INTERVENTIONS:  -  Assess patient's ability to carry out ADLs; assess patient's baseline for ADL function and identify physical deficits which impact ability to perform ADLs (bathing, care of mouth/teeth, toileting, grooming, dressing, etc )  - Assess/evaluate cause of self-care deficits   - Assess range of motion  - Assess patient's mobility; develop plan if impaired  - Assess patient's need for assistive devices and provide as appropriate  - Encourage maximum independence but intervene and supervise when necessary  - Involve family in performance of ADLs  - Assess for home care needs following discharge   - Consider OT consult to assist with ADL evaluation and planning for discharge  - Provide patient education as appropriate  Outcome: Progressing  Goal: Maintain or return mobility status to optimal level  Description: INTERVENTIONS:  - Assess patient's baseline mobility status (ambulation, transfers, stairs, etc )    - Identify cognitive and physical deficits and behaviors that affect mobility  - Identify mobility aids required to assist with transfers and/or ambulation (gait belt, sit-to-stand, lift, walker, cane, etc )  - [place] fall precautions as indicated by assessment  - Record patient progress and toleration of activity level on Mobility SBAR; progress patient to next Phase/Stage  - Instruct patient to call for assistance with activity based on assessment  - Consider rehabilitation consult to assist with strengthening/weightbearing, etc   Outcome: Progressing     Problem: DISCHARGE PLANNING  Goal: Discharge to home or other facility with appropriate resources  Description: INTERVENTIONS:  - Identify barriers to discharge w/patient and caregiver  - Arrange for needed discharge resources and transportation as appropriate  - Identify discharge learning needs (meds, wound care, etc )  - Arrange for interpretive services to assist at discharge as needed  - Refer to Case Management Department for coordinating discharge planning if the patient needs post-hospital services based on physician/advanced practitioner order or complex needs related to functional status, cognitive ability, or social support system  Outcome: Progressing     Problem: Knowledge Deficit  Goal: Patient/family/caregiver demonstrates understanding of disease process, treatment plan, medications, and discharge instructions  Description: Complete learning assessment and assess knowledge base    Interventions:  - Provide teaching at level of understanding  - Provide teaching via preferred learning methods  Outcome: Progressing

## 2021-03-23 PROBLEM — J45.909 MODERATE ASTHMA WITHOUT COMPLICATION: Status: ACTIVE | Noted: 2021-03-22

## 2021-03-23 PROBLEM — J45.901 MODERATE ASTHMA WITH ACUTE EXACERBATION: Status: ACTIVE | Noted: 2021-03-22

## 2021-03-23 PROBLEM — J96.01 ACUTE RESPIRATORY FAILURE WITH HYPOXIA (HCC): Status: ACTIVE | Noted: 2021-03-23

## 2021-03-23 LAB
ALBUMIN SERPL BCP-MCNC: 3.4 G/DL (ref 3.5–5.7)
ALP SERPL-CCNC: 25 U/L (ref 55–165)
ALT SERPL W P-5'-P-CCNC: 50 U/L (ref 7–52)
ANION GAP SERPL CALCULATED.3IONS-SCNC: 9 MMOL/L (ref 4–13)
AST SERPL W P-5'-P-CCNC: 58 U/L (ref 13–39)
BILIRUB SERPL-MCNC: 0.5 MG/DL (ref 0.2–1)
BUN SERPL-MCNC: 22 MG/DL (ref 7–25)
CALCIUM ALBUM COR SERPL-MCNC: 8.9 MG/DL (ref 8.3–10.1)
CALCIUM SERPL-MCNC: 8.4 MG/DL (ref 8.6–10.5)
CHLORIDE SERPL-SCNC: 106 MMOL/L (ref 98–107)
CO2 SERPL-SCNC: 23 MMOL/L (ref 21–31)
CREAT SERPL-MCNC: 0.9 MG/DL (ref 0.7–1.3)
CRP SERPL QL: 23.2 MG/L
D DIMER PPP FEU-MCNC: 0.5 UG/ML FEU
ERYTHROCYTE [DISTWIDTH] IN BLOOD BY AUTOMATED COUNT: 13.3 % (ref 11.5–14.5)
FERRITIN SERPL-MCNC: 475 NG/ML (ref 8–388)
GFR SERPL CREATININE-BSD FRML MDRD: 93 ML/MIN/1.73SQ M
GLUCOSE SERPL-MCNC: 103 MG/DL (ref 65–99)
HCT VFR BLD AUTO: 38.7 % (ref 42–47)
HGB BLD-MCNC: 13.1 G/DL (ref 14–18)
MCH RBC QN AUTO: 31.4 PG (ref 26–34)
MCHC RBC AUTO-ENTMCNC: 33.8 G/DL (ref 31–37)
MCV RBC AUTO: 93 FL (ref 81–99)
PLATELET # BLD AUTO: 153 THOUSANDS/UL (ref 149–390)
PMV BLD AUTO: 8.4 FL (ref 8.6–11.7)
POTASSIUM SERPL-SCNC: 4 MMOL/L (ref 3.5–5.5)
PROCALCITONIN SERPL-MCNC: <0.05 NG/ML
PROT SERPL-MCNC: 6.4 G/DL (ref 6.4–8.9)
RBC # BLD AUTO: 4.17 MILLION/UL (ref 4.3–5.9)
SODIUM SERPL-SCNC: 138 MMOL/L (ref 134–143)
WBC # BLD AUTO: 6.2 THOUSAND/UL (ref 4.8–10.8)

## 2021-03-23 PROCEDURE — 82728 ASSAY OF FERRITIN: CPT | Performed by: NURSE PRACTITIONER

## 2021-03-23 PROCEDURE — 85027 COMPLETE CBC AUTOMATED: CPT | Performed by: NURSE PRACTITIONER

## 2021-03-23 PROCEDURE — 80053 COMPREHEN METABOLIC PANEL: CPT | Performed by: NURSE PRACTITIONER

## 2021-03-23 PROCEDURE — 99232 SBSQ HOSP IP/OBS MODERATE 35: CPT | Performed by: NURSE PRACTITIONER

## 2021-03-23 PROCEDURE — 86140 C-REACTIVE PROTEIN: CPT | Performed by: NURSE PRACTITIONER

## 2021-03-23 PROCEDURE — 85379 FIBRIN DEGRADATION QUANT: CPT | Performed by: NURSE PRACTITIONER

## 2021-03-23 PROCEDURE — 84145 PROCALCITONIN (PCT): CPT | Performed by: PHYSICIAN ASSISTANT

## 2021-03-23 RX ADMIN — OMEGA-3 FATTY ACIDS CAP 1000 MG 1000 MG: 1000 CAP at 09:34

## 2021-03-23 RX ADMIN — REMDESIVIR 100 MG: 100 INJECTION, POWDER, LYOPHILIZED, FOR SOLUTION INTRAVENOUS at 11:01

## 2021-03-23 RX ADMIN — GABAPENTIN 300 MG: 300 CAPSULE ORAL at 21:19

## 2021-03-23 RX ADMIN — FAMOTIDINE 20 MG: 20 TABLET ORAL at 09:34

## 2021-03-23 RX ADMIN — OXYCODONE HYDROCHLORIDE AND ACETAMINOPHEN 1000 MG: 500 TABLET ORAL at 09:34

## 2021-03-23 RX ADMIN — FAMOTIDINE 20 MG: 20 TABLET ORAL at 17:05

## 2021-03-23 RX ADMIN — LISINOPRIL 40 MG: 20 TABLET ORAL at 09:34

## 2021-03-23 RX ADMIN — Medication 2000 UNITS: at 09:35

## 2021-03-23 RX ADMIN — OMEGA-3 FATTY ACIDS CAP 1000 MG 1000 MG: 1000 CAP at 17:05

## 2021-03-23 RX ADMIN — CEFTRIAXONE 1000 MG: 1 INJECTION, SOLUTION INTRAVENOUS at 01:09

## 2021-03-23 RX ADMIN — OXYCODONE HYDROCHLORIDE AND ACETAMINOPHEN 1000 MG: 500 TABLET ORAL at 21:19

## 2021-03-23 RX ADMIN — DOXYCYCLINE 100 MG: 100 CAPSULE ORAL at 09:34

## 2021-03-23 RX ADMIN — APIXABAN 5 MG: 5 TABLET, FILM COATED ORAL at 09:35

## 2021-03-23 RX ADMIN — BUDESONIDE AND FORMOTEROL FUMARATE DIHYDRATE 2 PUFF: 160; 4.5 AEROSOL RESPIRATORY (INHALATION) at 17:06

## 2021-03-23 RX ADMIN — TAMSULOSIN HYDROCHLORIDE 0.4 MG: 0.4 CAPSULE ORAL at 09:34

## 2021-03-23 RX ADMIN — FINASTERIDE 5 MG: 5 TABLET, FILM COATED ORAL at 09:34

## 2021-03-23 RX ADMIN — DEXAMETHASONE SODIUM PHOSPHATE 6 MG: 4 INJECTION, SOLUTION INTRA-ARTICULAR; INTRALESIONAL; INTRAMUSCULAR; INTRAVENOUS; SOFT TISSUE at 05:08

## 2021-03-23 RX ADMIN — TAMSULOSIN HYDROCHLORIDE 0.4 MG: 0.4 CAPSULE ORAL at 17:05

## 2021-03-23 RX ADMIN — ZINC SULFATE 220 MG (50 MG) CAPSULE 220 MG: CAPSULE at 09:34

## 2021-03-23 RX ADMIN — BUDESONIDE AND FORMOTEROL FUMARATE DIHYDRATE 2 PUFF: 160; 4.5 AEROSOL RESPIRATORY (INHALATION) at 09:37

## 2021-03-23 RX ADMIN — ACETAMINOPHEN 650 MG: 325 TABLET ORAL at 09:52

## 2021-03-23 RX ADMIN — APIXABAN 5 MG: 5 TABLET, FILM COATED ORAL at 17:05

## 2021-03-23 NOTE — ASSESSMENT & PLAN NOTE
· Noted by fever and tachypnea due to COVID-19 pneumonia  · The patient was admitted under mild pathway  · He was started on dexamethasone day 2/10 and remdesivir day 2/5  · Continue with COVID vitamins  · Anticoagulation- apixaban  · Trend inflammation markers  · Received 2 days of ceftriaxone and doxycycline; these were stopped as procalcitonin is negative x2  · Discussed convalescent plasma with patient but at this time he would like to wait  Handout given       Lab Results   Component Value Date    SARSCOV2 Positive (A) 03/21/2021    SARSCOV2 Not Detected 04/06/2020     Results from last 7 days   Lab Units 03/23/21  0456 03/22/21  0439 03/21/21  2344   D-DIMER QUANTITATIVE ug/ml FEU 0 50* 0 48  --    CRP mg/L 23 2*  --  33 0*   FERRITIN ng/mL 475*  --  628*     Results from last 7 days   Lab Units 03/23/21  0456 03/22/21  0439   PROCALCITONIN ng/ml <0 05 <0 05

## 2021-03-23 NOTE — ASSESSMENT & PLAN NOTE
· Due to COVID-19 pneumonia  · Continue treatment outlined above  · Respiratory protocol  · Oxygen supplement to maintain SpO2 greater than 90-92%  · Titrate off oxygen as able  · Encourage cough and deep breathing with incentive spirometry and early ambulation

## 2021-03-23 NOTE — ASSESSMENT & PLAN NOTE
· With mild exacerbation due to COVID-19 infection  · Continue Symbicort and p r n   Albuterol  · Respiratory protocol

## 2021-03-23 NOTE — PLAN OF CARE
Problem: Potential for Falls  Goal: Patient will remain free of falls  Description: INTERVENTIONS:  - Assess patient frequently for physical needs  -  Identify cognitive and physical deficits and behaviors that affect risk of falls    -  [employer] fall precautions as indicated by assessment   - Educate patient/family on patient safety including physical limitations  - Instruct patient to call for assistance with activity based on assessment  - Modify environment to reduce risk of injury  - Consider OT/PT consult to assist with strengthening/mobility  Outcome: Progressing     Problem: PAIN - ADULT  Goal: Verbalizes/displays adequate comfort level or baseline comfort level  Description: Interventions:  - Encourage patient to monitor pain and request assistance  - Assess pain using appropriate pain scale  - Administer analgesics based on type and severity of pain and evaluate response  - Implement non-pharmacological measures as appropriate and evaluate response  - Consider cultural and social influences on pain and pain management  - Notify physician/advanced practitioner if interventions unsuccessful or patient reports new pain  Outcome: Progressing     Problem: INFECTION - ADULT  Goal: Absence or prevention of progression during hospitalization  Description: INTERVENTIONS:  - Assess and monitor for signs and symptoms of infection  - Monitor lab/diagnostic results  - Monitor all insertion sites, i e  indwelling lines, tubes, and drains  - Monitor endotracheal if appropriate and nasal secretions for changes in amount and color  - [employer] appropriate cooling/warming therapies per order  - Administer medications as ordered  - Instruct and encourage patient and family to use good hand hygiene technique  - Identify and instruct in appropriate isolation precautions for identified infection/condition  Outcome: Progressing  Goal: Absence of fever/infection during neutropenic period  Description: INTERVENTIONS:  - Monitor WBC    Outcome: Progressing     Problem: SAFETY ADULT  Goal: Maintain or return to baseline ADL function  Description: INTERVENTIONS:  -  Assess patient's ability to carry out ADLs; assess patient's baseline for ADL function and identify physical deficits which impact ability to perform ADLs (bathing, care of mouth/teeth, toileting, grooming, dressing, etc )  - Assess/evaluate cause of self-care deficits   - Assess range of motion  - Assess patient's mobility; develop plan if impaired  - Assess patient's need for assistive devices and provide as appropriate  - Encourage maximum independence but intervene and supervise when necessary  - Involve family in performance of ADLs  - Assess for home care needs following discharge   - Consider OT consult to assist with ADL evaluation and planning for discharge  - Provide patient education as appropriate  Outcome: Progressing  Goal: Maintain or return mobility status to optimal level  Description: INTERVENTIONS:  - Assess patient's baseline mobility status (ambulation, transfers, stairs, etc )    - Identify cognitive and physical deficits and behaviors that affect mobility  - Identify mobility aids required to assist with transfers and/or ambulation (gait belt, sit-to-stand, lift, walker, cane, etc )  - [employer] fall precautions as indicated by assessment  - Record patient progress and toleration of activity level on Mobility SBAR; progress patient to next Phase/Stage  - Instruct patient to call for assistance with activity based on assessment  - Consider rehabilitation consult to assist with strengthening/weightbearing, etc   Outcome: Progressing     Problem: DISCHARGE PLANNING  Goal: Discharge to home or other facility with appropriate resources  Description: INTERVENTIONS:  - Identify barriers to discharge w/patient and caregiver  - Arrange for needed discharge resources and transportation as appropriate  - Identify discharge learning needs (meds, wound care, etc )  - Arrange for interpretive services to assist at discharge as needed  - Refer to Case Management Department for coordinating discharge planning if the patient needs post-hospital services based on physician/advanced practitioner order or complex needs related to functional status, cognitive ability, or social support system  Outcome: Progressing     Problem: Knowledge Deficit  Goal: Patient/family/caregiver demonstrates understanding of disease process, treatment plan, medications, and discharge instructions  Description: Complete learning assessment and assess knowledge base  Interventions:  - Provide teaching at level of understanding  - Provide teaching via preferred learning methods  Outcome: Progressing     Problem: Nutrition/Hydration-ADULT  Goal: Nutrient/Hydration intake appropriate for improving, restoring or maintaining nutritional needs  Description: Monitor and assess patient's nutrition/hydration status for malnutrition  Collaborate with interdisciplinary team and initiate plan and interventions as ordered  Monitor patient's weight and dietary intake as ordered or per policy  Utilize nutrition screening tool and intervene as necessary  Determine patient's food preferences and provide high-protein, high-caloric foods as appropriate       INTERVENTIONS:  - Monitor oral intake, urinary output, labs, and treatment plans  - Assess nutrition and hydration status and recommend course of action  - Evaluate amount of meals eaten  - Assist patient with eating if necessary   - Allow adequate time for meals  - Recommend/ encourage appropriate diets, oral nutritional supplements, and vitamin/mineral supplements  - Order, calculate, and assess calorie counts as needed  - Recommend, monitor, and adjust tube feedings and TPN/PPN based on assessed needs  - Assess need for intravenous fluids  - Provide specific nutrition/hydration education as appropriate  - Include patient/family/caregiver in decisions related to nutrition  Outcome: Progressing

## 2021-03-23 NOTE — UTILIZATION REVIEW
Notification of Inpatient Admission/Inpatient Authorization Request   This is a Notification of Inpatient Admission for [address]  Be advised that this patient was admitted to our facility under Inpatient Status  Contact Rina Sherman at [phone] for additional admission information  [facility] DEPT  DEDICATED [phone]  Patient Name:   Dai Gaxiola   YOB: 1960       [address]   [address]:   [facility]  Tax ID: [id number]  NPI: [id number] Attending Provider/NPI:  Phone:  Address: David Mckinley [[id number]]  [phone]  Same as the facility   Place of Service Code: 24 Place of Service Name:  [address] [place] [address]   Start Date: 3/22/21 1556 Discharge Date & Time: No discharge date for patient encounter  Type of Admission: Inpatient Status Discharge Disposition   (if discharged): Home/Self Care   Patient Diagnoses: [id number] [U07 [id number]]     Orders: Admission Orders (From admission, onward)     Ordered        03/22/21 1556  Inpatient Admission  Once         03/22/21 0113  Place in Observation  Once                    Assigned Utilization Review Contact: Gilson Cleaning  Utilization   Network Utilization Review Department  Phone: [phone]; Fax [phone]  Email: [email or web address]   ATTENTION PAYERS: Please call the assigned Utilization  directly with any questions or concerns ALL voicemails in the department are confidential  Send all requests for admission clinical reviews, approved or denied determinations and any other requests to dedicated fax number belonging to the campus where the patient is receiving treatment

## 2021-03-23 NOTE — PROGRESS NOTES
[facility]  Progress Note - Melvin Ravi 1960, 61 y o  male MRN: [id number]  Unit/Bed#: -01 Encounter: [id number]  Primary Care Provider: Lisa Cerrato MD   Date and time admitted to hospital: 3/21/2021 10:56 PM    * Sepsis due to COVID-19 [facility])  Assessment & Plan  · Noted by fever and tachypnea due to COVID-19 pneumonia  · The patient was admitted under mild pathway  · He was started on dexamethasone day 2/10 and remdesivir day 2/5  · Continue with COVID vitamins  · Anticoagulation- apixaban  · Trend inflammation markers  · Received 2 days of ceftriaxone and doxycycline; these were stopped as procalcitonin is negative x2  · Discussed convalescent plasma with patient but at this time he would like to wait  Handout given  Lab Results   Component Value Date    SARSCOV2 Positive (A) 03/21/2021    SARSCOV2 Not Detected 04/06/2020     Results from last 7 days   Lab Units 03/23/21  0456 03/22/21  0439 03/21/21  2344   D-DIMER QUANTITATIVE ug/ml FEU 0 50* 0 48  --    CRP mg/L 23 2*  --  33 0*   FERRITIN ng/mL 475*  --  628*     Results from last 7 days   Lab Units 03/23/21  0456 03/22/21  0439   PROCALCITONIN ng/ml <0 05 <0 05         Acute respiratory failure with hypoxia (HCC)  Assessment & Plan  · Due to COVID-19 pneumonia  · Continue treatment outlined above  · Respiratory protocol  · Oxygen supplement to maintain SpO2 greater than 90-92%  · Titrate off oxygen as able  · Encourage cough and deep breathing with incentive spirometry and early ambulation    History of DVT (deep vein thrombosis)  Assessment & Plan  · And history of pulmonary embolism  · Continue Eliquis 5 mg b i d     Moderate asthma without complication  Assessment & Plan  · With mild exacerbation due to COVID-19 infection  · Continue Symbicort and p r n   Albuterol  · Respiratory protocol    Essential hypertension  Assessment & Plan  · Continue lisinopril     Benign prostatic hyperplasia with lower urinary tract symptoms  Assessment & Plan  · Continue Proscar and Flomax       VTE Prophylaxis:  Apixaban (Eliquis)    Patient Centered Rounds: I have performed bedside rounds with nursing staff today  Discussions with Specialists or Other Care Team Provider:  Nursing, case management, respiratory therapy  Education and Discussions with Family / Patient:  Patient  Offered to update family however the patient would like to do so himself    Current Length of Stay: 1 day(s)    Current Patient Status: Inpatient   Certification Statement: The patient will continue to require additional inpatient hospital stay due to Sepsis    Discharge Plan:  Pending hospital course    Code Status: Level 1 - Full Code    Subjective:   Feeling slightly better now  He was feeling tired earlier this morning  Denies any chest pain  Complain of some mild shortness of breath with ambulation  Objective:     Vitals:   Temp (24hrs), Av 6 °F (37 °C), Min:98 5 °F (36 9 °C), Max:98 6 °F (37 °C)    Temp:  [98 5 °F (36 9 °C)-98 6 °F (37 °C)] 98 5 °F (36 9 °C)  HR:  [69-81] 69  Resp:  [16-20] 16  BP: (123-128)/(76-77) 123/77  SpO2:  [93 %-95 %] 93 %  Body mass index is 36 24 kg/m²  Input and Output Summary (last 24 hours): Intake/Output Summary (Last 24 hours) at 3/23/2021 1540  Last data filed at 3/23/2021 1300  Gross per 24 hour   Intake 600 ml   Output --   Net 600 ml       Physical Exam:   Physical Exam  Vitals signs and nursing note reviewed  Constitutional:       General: He is not in acute distress  Appearance: Normal appearance  HENT:      Head: Normocephalic and atraumatic  Right Ear: External ear normal       Left Ear: External ear normal       Nose: Nose normal       Mouth/Throat:      Mouth: Mucous membranes are moist       Pharynx: Oropharynx is clear  Eyes:      General:         Right eye: No discharge  Left eye: No discharge  Extraocular Movements: Extraocular movements intact        Pupils: Pupils are equal, round, and reactive to light  Neck:      Musculoskeletal: Normal range of motion and neck supple  No neck rigidity  Cardiovascular:      Rate and Rhythm: Normal rate and regular rhythm  Pulses: Normal pulses  Heart sounds: Normal heart sounds  No murmur  Pulmonary:      Effort: Pulmonary effort is normal  No respiratory distress  Breath sounds: No wheezing or rales  Comments: Decreased  Abdominal:      General: Bowel sounds are normal  There is no distension  Palpations: Abdomen is soft  There is no mass  Tenderness: There is no abdominal tenderness  Musculoskeletal: Normal range of motion  General: No swelling, tenderness or deformity  Skin:     General: Skin is warm and dry  Capillary Refill: Capillary refill takes less than 2 seconds  Coloration: Skin is not pale  Findings: No erythema  Neurological:      General: No focal deficit present  Mental Status: He is alert and oriented to person, place, and time  Mental status is at baseline  Psychiatric:         Mood and Affect: Mood normal          Behavior: Behavior normal          Thought Content: Thought content normal          Judgment: Judgment normal          Additional Data:     Labs:    Results from last 7 days   Lab Units 03/23/21  0456 03/21/21  2344   WBC Thousand/uL 6 20 5 00   HEMOGLOBIN g/dL 13 1* 14 8   HEMATOCRIT % 38 7* 43 3   PLATELETS Thousands/uL 153 152   NEUTROS PCT %  --  74   LYMPHS PCT %  --  15*   MONOS PCT %  --  11   EOS PCT %  --  0     Results from last 7 days   Lab Units 03/23/21  0456   SODIUM mmol/L 138   POTASSIUM mmol/L 4 0   CHLORIDE mmol/L 106   CO2 mmol/L 23   BUN mg/dL 22   CREATININE mg/dL 0 90   CALCIUM mg/dL 8 4*   ALK PHOS U/L 25*   ALT U/L 50   AST U/L 58*                   * I Have Reviewed All Lab Data Listed Above  * Additional Pertinent Lab Tests Reviewed:  Rebeca [address] Admission  Reviewed    Imaging:  Imaging Reports Reviewed Today Include: n/a     Recent Cultures (last 7 days):           Last 24 Hours Medication List:   Current Facility-Administered Medications   Medication Dose Route Frequency Provider Last Rate    acetaminophen  650 mg Oral Q4H PRN Tony Loud, PA-C      albuterol  2 puff Inhalation Q6H PRN Vipin Crocker MD      apixaban  5 mg Oral BID Tony Loud, PA-C      ascorbic acid  1,000 mg Oral Q12H [address] Tony Loud, PA-C      budesonide-formoterol  2 puff Inhalation BID Tony Loud, PA-C      cholecalciferol  2,000 Units Oral Daily Tony Loud, PA-C      dexamethasone  6 mg Intravenous Q24H Tony Loud, PA-C      famotidine  20 mg Oral BID Tony Loud, PA-C      finasteride  5 mg Oral Daily Tony Loud, PA-C      fish oil  1,000 mg Oral BID Tony Loud, PA-C      gabapentin  300 mg Oral HS Tony Loud, PA-C      lisinopril  40 mg Oral Daily Tony Loud, PA-C      zinc sulfate  220 mg Oral Daily Tony Loud, PA-C      Followed by   Taisha Elmore ON 3/29/2021] multivitamin-minerals  1 tablet Oral Daily Tony Loud, PA-C      ondansetron  4 mg Intravenous Q6H PRN Tony Loud, PA-C      remdesivir  100 mg Intravenous Q24H Tony Loud, PA-C      tamsulosin  0 4 mg Oral BID Tony Loud, PA-C          Today, Patient Was Seen By: LIS Fontaine    ** Please Note: Dictation voice to text software may have been used in the creation of this document   **

## 2021-03-24 LAB
ALBUMIN SERPL BCP-MCNC: 3.6 G/DL (ref 3.5–5.7)
ALP SERPL-CCNC: 27 U/L (ref 55–165)
ALT SERPL W P-5'-P-CCNC: 55 U/L (ref 7–52)
ANION GAP SERPL CALCULATED.3IONS-SCNC: 9 MMOL/L (ref 4–13)
AST SERPL W P-5'-P-CCNC: 52 U/L (ref 13–39)
BILIRUB SERPL-MCNC: 0.5 MG/DL (ref 0.2–1)
BUN SERPL-MCNC: 23 MG/DL (ref 7–25)
CALCIUM SERPL-MCNC: 8.9 MG/DL (ref 8.6–10.5)
CHLORIDE SERPL-SCNC: 106 MMOL/L (ref 98–107)
CO2 SERPL-SCNC: 25 MMOL/L (ref 21–31)
CREAT SERPL-MCNC: 0.82 MG/DL (ref 0.7–1.3)
CRP SERPL QL: 15.7 MG/L
D DIMER PPP FEU-MCNC: 0.43 UG/ML FEU
ERYTHROCYTE [DISTWIDTH] IN BLOOD BY AUTOMATED COUNT: 13.5 % (ref 11.5–14.5)
FERRITIN SERPL-MCNC: 387 NG/ML (ref 8–388)
GFR SERPL CREATININE-BSD FRML MDRD: 96 ML/MIN/1.73SQ M
GLUCOSE SERPL-MCNC: 110 MG/DL (ref 65–99)
HCT VFR BLD AUTO: 38.8 % (ref 42–47)
HGB BLD-MCNC: 13.4 G/DL (ref 14–18)
MCH RBC QN AUTO: 31.9 PG (ref 26–34)
MCHC RBC AUTO-ENTMCNC: 34.5 G/DL (ref 31–37)
MCV RBC AUTO: 92 FL (ref 81–99)
PLATELET # BLD AUTO: 180 THOUSANDS/UL (ref 149–390)
PMV BLD AUTO: 8.4 FL (ref 8.6–11.7)
POTASSIUM SERPL-SCNC: 3.6 MMOL/L (ref 3.5–5.5)
PROT SERPL-MCNC: 6.6 G/DL (ref 6.4–8.9)
RBC # BLD AUTO: 4.2 MILLION/UL (ref 4.3–5.9)
SODIUM SERPL-SCNC: 140 MMOL/L (ref 134–143)
WBC # BLD AUTO: 6 THOUSAND/UL (ref 4.8–10.8)

## 2021-03-24 PROCEDURE — 85027 COMPLETE CBC AUTOMATED: CPT | Performed by: NURSE PRACTITIONER

## 2021-03-24 PROCEDURE — 86140 C-REACTIVE PROTEIN: CPT | Performed by: NURSE PRACTITIONER

## 2021-03-24 PROCEDURE — 82728 ASSAY OF FERRITIN: CPT | Performed by: NURSE PRACTITIONER

## 2021-03-24 PROCEDURE — 80053 COMPREHEN METABOLIC PANEL: CPT | Performed by: NURSE PRACTITIONER

## 2021-03-24 PROCEDURE — 99232 SBSQ HOSP IP/OBS MODERATE 35: CPT | Performed by: NURSE PRACTITIONER

## 2021-03-24 PROCEDURE — 85379 FIBRIN DEGRADATION QUANT: CPT | Performed by: NURSE PRACTITIONER

## 2021-03-24 PROCEDURE — 94760 N-INVAS EAR/PLS OXIMETRY 1: CPT

## 2021-03-24 PROCEDURE — 94664 DEMO&/EVAL PT USE INHALER: CPT

## 2021-03-24 RX ADMIN — OXYCODONE HYDROCHLORIDE AND ACETAMINOPHEN 1000 MG: 500 TABLET ORAL at 08:20

## 2021-03-24 RX ADMIN — DEXAMETHASONE SODIUM PHOSPHATE 6 MG: 4 INJECTION, SOLUTION INTRA-ARTICULAR; INTRALESIONAL; INTRAMUSCULAR; INTRAVENOUS; SOFT TISSUE at 05:12

## 2021-03-24 RX ADMIN — FAMOTIDINE 20 MG: 20 TABLET ORAL at 08:20

## 2021-03-24 RX ADMIN — TAMSULOSIN HYDROCHLORIDE 0.4 MG: 0.4 CAPSULE ORAL at 08:20

## 2021-03-24 RX ADMIN — GABAPENTIN 300 MG: 300 CAPSULE ORAL at 21:43

## 2021-03-24 RX ADMIN — BUDESONIDE AND FORMOTEROL FUMARATE DIHYDRATE 2 PUFF: 160; 4.5 AEROSOL RESPIRATORY (INHALATION) at 08:21

## 2021-03-24 RX ADMIN — TAMSULOSIN HYDROCHLORIDE 0.4 MG: 0.4 CAPSULE ORAL at 17:36

## 2021-03-24 RX ADMIN — OMEGA-3 FATTY ACIDS CAP 1000 MG 1000 MG: 1000 CAP at 08:20

## 2021-03-24 RX ADMIN — APIXABAN 5 MG: 5 TABLET, FILM COATED ORAL at 17:36

## 2021-03-24 RX ADMIN — OMEGA-3 FATTY ACIDS CAP 1000 MG 1000 MG: 1000 CAP at 17:36

## 2021-03-24 RX ADMIN — ZINC SULFATE 220 MG (50 MG) CAPSULE 220 MG: CAPSULE at 08:20

## 2021-03-24 RX ADMIN — APIXABAN 5 MG: 5 TABLET, FILM COATED ORAL at 08:20

## 2021-03-24 RX ADMIN — FAMOTIDINE 20 MG: 20 TABLET ORAL at 17:36

## 2021-03-24 RX ADMIN — ACETAMINOPHEN 650 MG: 325 TABLET ORAL at 21:49

## 2021-03-24 RX ADMIN — LISINOPRIL 40 MG: 20 TABLET ORAL at 08:20

## 2021-03-24 RX ADMIN — FINASTERIDE 5 MG: 5 TABLET, FILM COATED ORAL at 08:20

## 2021-03-24 RX ADMIN — Medication 2000 UNITS: at 08:20

## 2021-03-24 RX ADMIN — BUDESONIDE AND FORMOTEROL FUMARATE DIHYDRATE 2 PUFF: 160; 4.5 AEROSOL RESPIRATORY (INHALATION) at 17:36

## 2021-03-24 RX ADMIN — OXYCODONE HYDROCHLORIDE AND ACETAMINOPHEN 1000 MG: 500 TABLET ORAL at 21:43

## 2021-03-24 RX ADMIN — REMDESIVIR 100 MG: 100 INJECTION, POWDER, LYOPHILIZED, FOR SOLUTION INTRAVENOUS at 08:21

## 2021-03-24 NOTE — PROGRESS NOTES
[facility]  Progress Note - Judit Matthews 1960, 61 y o  male MRN: [id number]  Unit/Bed#: -01 Encounter: [id number]  Primary Care Provider: Cuca Doll MD   Date and time admitted to hospital: 3/21/2021 10:56 PM    * Sepsis due to COVID-19 [facility])  Assessment & Plan  · Noted by fever and tachypnea due to COVID-19 pneumonia  · The patient was admitted under mild pathway  · He was started on dexamethasone day 3/10 and remdesivir day 3/5  · Continue with COVID vitamins  · Anticoagulation- apixaban  · Trend inflammation markers  · Received 2 days of ceftriaxone and doxycycline; these were stopped as procalcitonin is negative x2  · Discussed convalescent plasma with patient but at this time he would like to wait  Handout given  Lab Results   Component Value Date    SARSCOV2 Positive (A) 03/21/2021    SARSCOV2 Not Detected 04/06/2020     Results from last 7 days   Lab Units 03/24/21  0441 03/23/21  0456 03/22/21  0439 03/21/21  2344   D-DIMER QUANTITATIVE ug/ml FEU 0 43 0 50* 0 48  --    CRP mg/L 15 7* 23 2*  --  33 0*   FERRITIN ng/mL  --  475*  --  628*     Results from last 7 days   Lab Units 03/23/21  0456 03/22/21  0439   PROCALCITONIN ng/ml <0 05 <0 05         Acute respiratory failure with hypoxia (HCC)  Assessment & Plan  · Due to COVID-19 pneumonia  · Continue treatment outlined above  · Respiratory protocol  · Oxygen supplement to maintain SpO2 greater than 90-92%  · Titrate off oxygen as able; 4L -> 3L today   · Encourage cough and deep breathing with incentive spirometry and early ambulation    History of DVT (deep vein thrombosis)  Assessment & Plan  · And history of pulmonary embolism  · Continue Eliquis 5 mg b i d      Moderate asthma without complication  Assessment & Plan  · With mild exacerbation due to COVID-19 infection  · Continue Symbicort and p r n  Albuterol  · Reports "feeling worse in the morning but better as the day progress"   Will try albuterol TID/QID  · Respiratory protocol   · Please see COVID treatment outline above    Essential hypertension  Assessment & Plan  · Continue lisinopril      Benign prostatic hyperplasia with lower urinary tract symptoms  Assessment & Plan  · Continue Proscar and Flomax          VTE Prophylaxis:  Apixaban (Eliquis)    Patient Centered Rounds: I have performed bedside rounds with nursing staff today  Discussions with Specialists or Other Care Team Provider:  Nursing, case management, RT  Education and Discussions with Family / Patient:  Patient    Current Length of Stay: 2 day(s)    Current Patient Status: Inpatient   Certification Statement: The patient will continue to require additional inpatient hospital stay due to COVID-19 pneumonia    Discharge Plan:  Pending hospital course    Code Status: Level 1 - Full Code    Subjective:   Feeling slightly better compared to when he 1st admitted however he does have some increasing shortness of breath 1st thing in the morning after he wakes up  He states that this gets better as the day goes on  Objective:     Vitals:   Temp (24hrs), Av 7 °F (36 5 °C), Min:96 4 °F (35 8 °C), Max:99 1 °F (37 3 °C)    Temp:  [96 4 °F (35 8 °C)-99 1 °F (37 3 °C)] 96 4 °F (35 8 °C)  HR:  [61-71] 61  Resp:  [13-16] 16  BP: (134-135)/(71-78) 135/71  SpO2:  [93 %-96 %] 93 %  Body mass index is 36 24 kg/m²  Input and Output Summary (last 24 hours): Intake/Output Summary (Last 24 hours) at 3/24/2021 0947  Last data filed at 3/23/2021 1300  Gross per 24 hour   Intake 240 ml   Output --   Net 240 ml       Physical Exam:   Physical Exam  Vitals signs and nursing note reviewed  Constitutional:       General: He is not in acute distress  Appearance: Normal appearance  HENT:      Head: Normocephalic and atraumatic        Right Ear: External ear normal       Left Ear: External ear normal       Nose: Nose normal       Mouth/Throat:      Mouth: Mucous membranes are moist       Pharynx: Oropharynx is clear  Eyes:      General:         Right eye: No discharge  Left eye: No discharge  Extraocular Movements: Extraocular movements intact  Pupils: Pupils are equal, round, and reactive to light  Neck:      Musculoskeletal: Normal range of motion and neck supple  No neck rigidity  Cardiovascular:      Rate and Rhythm: Normal rate and regular rhythm  Pulses: Normal pulses  Heart sounds: Normal heart sounds  No murmur  Pulmonary:      Effort: Pulmonary effort is normal  No respiratory distress  Breath sounds: Wheezing present  No rales  Abdominal:      General: Bowel sounds are normal  There is no distension  Palpations: Abdomen is soft  There is no mass  Tenderness: There is no abdominal tenderness  Musculoskeletal: Normal range of motion  General: No swelling, tenderness or deformity  Skin:     General: Skin is warm and dry  Capillary Refill: Capillary refill takes less than 2 seconds  Coloration: Skin is not pale  Findings: No erythema  Neurological:      General: No focal deficit present  Mental Status: He is alert and oriented to person, place, and time  Mental status is at baseline  Psychiatric:         Mood and Affect: Mood normal          Behavior: Behavior normal          Thought Content: Thought content normal          Judgment: Judgment normal          Additional Data:     Labs:    Results from last 7 days   Lab Units 03/24/21  0441  03/21/21  2344   WBC Thousand/uL 6 00   < > 5 00   HEMOGLOBIN g/dL 13 4*   < > 14 8   HEMATOCRIT % 38 8*   < > 43 3   PLATELETS Thousands/uL 180   < > 152   NEUTROS PCT %  --   --  74   LYMPHS PCT %  --   --  15*   MONOS PCT %  --   --  11   EOS PCT %  --   --  0    < > = values in this interval not displayed       Results from last 7 days   Lab Units 03/24/21  0441   SODIUM mmol/L 140   POTASSIUM mmol/L 3 6   CHLORIDE mmol/L 106   CO2 mmol/L 25   BUN mg/dL 23   CREATININE mg/dL 0  82   CALCIUM mg/dL 8 9   ALK PHOS U/L 27*   ALT U/L 55*   AST U/L 52*                   * I Have Reviewed All Lab Data Listed Above  * Additional Pertinent Lab Tests Reviewed: Rebeca [address] Admission  Reviewed    Imaging:  Imaging Reports Reviewed Today Include: n/a     Recent Cultures (last 7 days):           Last 24 Hours Medication List:   Current Facility-Administered Medications   Medication Dose Route Frequency Provider Last Rate    acetaminophen  650 mg Oral Q4H PRN Batool Glory, PA-C      albuterol  2 puff Inhalation Q6H PRN Rajesh Lozano MD      apixaban  5 mg Oral BID Batool Glory, PA-C      ascorbic acid  1,000 mg Oral Q12H [facility] Batool Glory, PA-C      budesonide-formoterol  2 puff Inhalation BID Batool Glory, PA-C      cholecalciferol  2,000 Units Oral Daily Batool Glory, PA-C      dexamethasone  6 mg Intravenous Q24H Batool Glory, PA-C      famotidine  20 mg Oral BID Batolo Glory, PA-C      finasteride  5 mg Oral Daily Batool Glory, PA-C      fish oil  1,000 mg Oral BID Batool Glory, PA-C      gabapentin  300 mg Oral HS Batool Glory, PA-C      lisinopril  40 mg Oral Daily Batool Glory, PA-C      zinc sulfate  220 mg Oral Daily Batool Glory, PA-C      Followed by   Spencer Wilkinson ON 3/29/2021] multivitamin-minerals  1 tablet Oral Daily Batool Glory, PA-C      ondansetron  4 mg Intravenous Q6H PRN Batool Glory, PA-C      remdesivir  100 mg Intravenous Q24H Batool Glory, PA-C      tamsulosin  0 4 mg Oral BID Batool Glory, PA-C          Today, Patient Was Seen By: LIS Dunaway    ** Please Note: Dictation voice to text software may have been used in the creation of this document   **

## 2021-03-24 NOTE — ASSESSMENT & PLAN NOTE
· With mild exacerbation due to COVID-19 infection  · Continue Symbicort and p r n  Albuterol  · Reports "feeling worse in the morning but better as the day progress"  Will try albuterol TID/QID     · Respiratory protocol   · Please see COVID treatment outline above

## 2021-03-24 NOTE — ASSESSMENT & PLAN NOTE
· Due to COVID-19 pneumonia  · Continue treatment outlined above  · Respiratory protocol  · Oxygen supplement to maintain SpO2 greater than 90-92%  · Titrate off oxygen as able; 4L -> 3L today   · Encourage cough and deep breathing with incentive spirometry and early ambulation

## 2021-03-24 NOTE — RESPIRATORY THERAPY NOTE
RT Protocol Note  Hector Gates 61 y o  male MRN: [id number]  Unit/Bed#: -01 Encounter: [id number]    Assessment    Principal Problem:    Sepsis due to COVID-19 [facility])  Active Problems:    Benign prostatic hyperplasia with lower urinary tract symptoms    Pneumonia due to COVID-19 virus    Essential hypertension    Moderate asthma without complication    History of DVT (deep vein thrombosis)    Acute respiratory failure with hypoxia (HCC)      Home Pulmonary Medications:  None    Home Devices/Therapy: Other (Comment)(pt  denies home 02 ,CPAP/BiPAP)    Past Medical History:   Diagnosis Date    Arthritis     Asthma     Enlarged prostate     Hyperlipidemia     Hypertension     Lumbar herniated disc      Social History     Socioeconomic History    Marital status: /Civil Union     Spouse name: None    Number of children: None    Years of education: None    Highest education level: None   Occupational History    Occupation: laboror   Social Needs    Financial resource strain: None    Food insecurity     Worry: None     Inability: None    Transportation needs     Medical: None     Non-medical: None   Tobacco Use    Smoking status: Never Smoker    Smokeless tobacco: Never Used   Substance and Sexual Activity    Alcohol use: Never     Frequency: Never    Drug use: Never    Sexual activity: Not Currently   Lifestyle    Physical activity     Days per week: None     Minutes per session: None    Stress: None   Relationships    Social connections     Talks on phone: None     Gets together: None     Attends Holiness service: None     Active member of club or organization: None     Attends meetings of clubs or organizations: None     Relationship status: None    Intimate partner violence     Fear of current or ex partner: None     Emotionally abused: None     Physically abused: None     Forced sexual activity: None   Other Topics Concern    None   Social History Narrative    None Subjective         Objective    Physical Exam:        Vitals:  Blood pressure 135/71, pulse 61, temperature (!) 96 4 °F (35 8 °C), temperature source Temporal, resp  rate 16, height 6' (1 829 m), weight 121 kg (267 lb 3 2 oz), SpO2 93 %  Imaging and other studies: I have personally reviewed pertinent reports  Plan  Home bronchodilator patient pathway     Respiratory comments:  Patient already ordered on his home bronchodilators  No Sob or distress noted  Will continue to   Monitor the patient

## 2021-03-24 NOTE — PLAN OF CARE
Problem: Potential for Falls  Goal: Patient will remain free of falls  Description: INTERVENTIONS:  - Assess patient frequently for physical needs  -  Identify cognitive and physical deficits and behaviors that affect risk of falls    -  [place] fall precautions as indicated by assessment   - Educate patient/family on patient safety including physical limitations  - Instruct patient to call for assistance with activity based on assessment  - Modify environment to reduce risk of injury  - Consider OT/PT consult to assist with strengthening/mobility  Outcome: Progressing     Problem: PAIN - ADULT  Goal: Verbalizes/displays adequate comfort level or baseline comfort level  Description: Interventions:  - Encourage patient to monitor pain and request assistance  - Assess pain using appropriate pain scale  - Administer analgesics based on type and severity of pain and evaluate response  - Implement non-pharmacological measures as appropriate and evaluate response  - Consider cultural and social influences on pain and pain management  - Notify physician/advanced practitioner if interventions unsuccessful or patient reports new pain  Outcome: Progressing     Problem: INFECTION - ADULT  Goal: Absence or prevention of progression during hospitalization  Description: INTERVENTIONS:  - Assess and monitor for signs and symptoms of infection  - Monitor lab/diagnostic results  - Monitor all insertion sites, i e  indwelling lines, tubes, and drains  - Monitor endotracheal if appropriate and nasal secretions for changes in amount and color  - [place] appropriate cooling/warming therapies per order  - Administer medications as ordered  - Instruct and encourage patient and family to use good hand hygiene technique  - Identify and instruct in appropriate isolation precautions for identified infection/condition  Outcome: Progressing  Goal: Absence of fever/infection during neutropenic period  Description: INTERVENTIONS:  - Monitor WBC    Outcome: Progressing     Problem: SAFETY ADULT  Goal: Maintain or return to baseline ADL function  Description: INTERVENTIONS:  -  Assess patient's ability to carry out ADLs; assess patient's baseline for ADL function and identify physical deficits which impact ability to perform ADLs (bathing, care of mouth/teeth, toileting, grooming, dressing, etc )  - Assess/evaluate cause of self-care deficits   - Assess range of motion  - Assess patient's mobility; develop plan if impaired  - Assess patient's need for assistive devices and provide as appropriate  - Encourage maximum independence but intervene and supervise when necessary  - Involve family in performance of ADLs  - Assess for home care needs following discharge   - Consider OT consult to assist with ADL evaluation and planning for discharge  - Provide patient education as appropriate  Outcome: Progressing  Goal: Maintain or return mobility status to optimal level  Description: INTERVENTIONS:  - Assess patient's baseline mobility status (ambulation, transfers, stairs, etc )    - Identify cognitive and physical deficits and behaviors that affect mobility  - Identify mobility aids required to assist with transfers and/or ambulation (gait belt, sit-to-stand, lift, walker, cane, etc )  - [place] fall precautions as indicated by assessment  - Record patient progress and toleration of activity level on Mobility SBAR; progress patient to next Phase/Stage  - Instruct patient to call for assistance with activity based on assessment  - Consider rehabilitation consult to assist with strengthening/weightbearing, etc   Outcome: Progressing     Problem: DISCHARGE PLANNING  Goal: Discharge to home or other facility with appropriate resources  Description: INTERVENTIONS:  - Identify barriers to discharge w/patient and caregiver  - Arrange for needed discharge resources and transportation as appropriate  - Identify discharge learning needs (meds, wound care, etc )  - Arrange for interpretive services to assist at discharge as needed  - Refer to Case Management Department for coordinating discharge planning if the patient needs post-hospital services based on physician/advanced practitioner order or complex needs related to functional status, cognitive ability, or social support system  Outcome: Progressing     Problem: Knowledge Deficit  Goal: Patient/family/caregiver demonstrates understanding of disease process, treatment plan, medications, and discharge instructions  Description: Complete learning assessment and assess knowledge base  Interventions:  - Provide teaching at level of understanding  - Provide teaching via preferred learning methods  Outcome: Progressing     Problem: Nutrition/Hydration-ADULT  Goal: Nutrient/Hydration intake appropriate for improving, restoring or maintaining nutritional needs  Description: Monitor and assess patient's nutrition/hydration status for malnutrition  Collaborate with interdisciplinary team and initiate plan and interventions as ordered  Monitor patient's weight and dietary intake as ordered or per policy  Utilize nutrition screening tool and intervene as necessary  Determine patient's food preferences and provide high-protein, high-caloric foods as appropriate       INTERVENTIONS:  - Monitor oral intake, urinary output, labs, and treatment plans  - Assess nutrition and hydration status and recommend course of action  - Evaluate amount of meals eaten  - Assist patient with eating if necessary   - Allow adequate time for meals  - Recommend/ encourage appropriate diets, oral nutritional supplements, and vitamin/mineral supplements  - Order, calculate, and assess calorie counts as needed  - Recommend, monitor, and adjust tube feedings and TPN/PPN based on assessed needs  - Assess need for intravenous fluids  - Provide specific nutrition/hydration education as appropriate  - Include patient/family/caregiver in decisions related to nutrition  Outcome: Progressing

## 2021-03-24 NOTE — CASE MANAGEMENT
Continues with COVID treatment, on 3L of oxygen  Will need a desat study if requiring O2 upon discharge

## 2021-03-25 PROBLEM — E87.1 HYPONATREMIA: Status: ACTIVE | Noted: 2021-03-25

## 2021-03-25 LAB
ALBUMIN SERPL BCP-MCNC: 3.4 G/DL (ref 3.5–5.7)
ALP SERPL-CCNC: 28 U/L (ref 55–165)
ALT SERPL W P-5'-P-CCNC: 53 U/L (ref 7–52)
ANION GAP SERPL CALCULATED.3IONS-SCNC: 10 MMOL/L (ref 4–13)
AST SERPL W P-5'-P-CCNC: 39 U/L (ref 13–39)
BILIRUB SERPL-MCNC: 0.6 MG/DL (ref 0.2–1)
BUN SERPL-MCNC: 25 MG/DL (ref 7–25)
CALCIUM ALBUM COR SERPL-MCNC: 9.1 MG/DL (ref 8.3–10.1)
CALCIUM SERPL-MCNC: 8.6 MG/DL (ref 8.6–10.5)
CHLORIDE SERPL-SCNC: 106 MMOL/L (ref 98–107)
CO2 SERPL-SCNC: 24 MMOL/L (ref 21–31)
CREAT SERPL-MCNC: 0.72 MG/DL (ref 0.7–1.3)
CRP SERPL QL: 8.7 MG/L
D DIMER PPP FEU-MCNC: 0.39 UG/ML FEU
ERYTHROCYTE [DISTWIDTH] IN BLOOD BY AUTOMATED COUNT: 13.5 % (ref 11.5–14.5)
FERRITIN SERPL-MCNC: 340 NG/ML (ref 8–388)
GFR SERPL CREATININE-BSD FRML MDRD: 101 ML/MIN/1.73SQ M
GLUCOSE SERPL-MCNC: 118 MG/DL (ref 65–99)
HCT VFR BLD AUTO: 38.7 % (ref 42–47)
HGB BLD-MCNC: 13.6 G/DL (ref 14–18)
MCH RBC QN AUTO: 32.2 PG (ref 26–34)
MCHC RBC AUTO-ENTMCNC: 35 G/DL (ref 31–37)
MCV RBC AUTO: 92 FL (ref 81–99)
PLATELET # BLD AUTO: 216 THOUSANDS/UL (ref 149–390)
PMV BLD AUTO: 8.9 FL (ref 8.6–11.7)
POTASSIUM SERPL-SCNC: 3.7 MMOL/L (ref 3.5–5.5)
PROT SERPL-MCNC: 6.4 G/DL (ref 6.4–8.9)
RBC # BLD AUTO: 4.21 MILLION/UL (ref 4.3–5.9)
SODIUM SERPL-SCNC: 140 MMOL/L (ref 134–143)
WBC # BLD AUTO: 8.8 THOUSAND/UL (ref 4.8–10.8)

## 2021-03-25 PROCEDURE — 86140 C-REACTIVE PROTEIN: CPT | Performed by: NURSE PRACTITIONER

## 2021-03-25 PROCEDURE — 82728 ASSAY OF FERRITIN: CPT | Performed by: NURSE PRACTITIONER

## 2021-03-25 PROCEDURE — 85379 FIBRIN DEGRADATION QUANT: CPT | Performed by: NURSE PRACTITIONER

## 2021-03-25 PROCEDURE — 85027 COMPLETE CBC AUTOMATED: CPT | Performed by: NURSE PRACTITIONER

## 2021-03-25 PROCEDURE — 94760 N-INVAS EAR/PLS OXIMETRY 1: CPT

## 2021-03-25 PROCEDURE — 99232 SBSQ HOSP IP/OBS MODERATE 35: CPT | Performed by: NURSE PRACTITIONER

## 2021-03-25 PROCEDURE — 80053 COMPREHEN METABOLIC PANEL: CPT | Performed by: NURSE PRACTITIONER

## 2021-03-25 RX ADMIN — BUDESONIDE AND FORMOTEROL FUMARATE DIHYDRATE 2 PUFF: 160; 4.5 AEROSOL RESPIRATORY (INHALATION) at 10:02

## 2021-03-25 RX ADMIN — FINASTERIDE 5 MG: 5 TABLET, FILM COATED ORAL at 09:28

## 2021-03-25 RX ADMIN — LISINOPRIL 40 MG: 20 TABLET ORAL at 09:28

## 2021-03-25 RX ADMIN — OMEGA-3 FATTY ACIDS CAP 1000 MG 1000 MG: 1000 CAP at 09:29

## 2021-03-25 RX ADMIN — ZINC SULFATE 220 MG (50 MG) CAPSULE 220 MG: CAPSULE at 09:29

## 2021-03-25 RX ADMIN — REMDESIVIR 100 MG: 100 INJECTION, POWDER, LYOPHILIZED, FOR SOLUTION INTRAVENOUS at 09:28

## 2021-03-25 RX ADMIN — Medication 2000 UNITS: at 09:28

## 2021-03-25 RX ADMIN — GABAPENTIN 300 MG: 300 CAPSULE ORAL at 22:02

## 2021-03-25 RX ADMIN — FAMOTIDINE 20 MG: 20 TABLET ORAL at 09:29

## 2021-03-25 RX ADMIN — OXYCODONE HYDROCHLORIDE AND ACETAMINOPHEN 1000 MG: 500 TABLET ORAL at 09:29

## 2021-03-25 RX ADMIN — TAMSULOSIN HYDROCHLORIDE 0.4 MG: 0.4 CAPSULE ORAL at 09:29

## 2021-03-25 RX ADMIN — BUDESONIDE AND FORMOTEROL FUMARATE DIHYDRATE 2 PUFF: 160; 4.5 AEROSOL RESPIRATORY (INHALATION) at 17:23

## 2021-03-25 RX ADMIN — OMEGA-3 FATTY ACIDS CAP 1000 MG 1000 MG: 1000 CAP at 17:19

## 2021-03-25 RX ADMIN — FAMOTIDINE 20 MG: 20 TABLET ORAL at 17:19

## 2021-03-25 RX ADMIN — TAMSULOSIN HYDROCHLORIDE 0.4 MG: 0.4 CAPSULE ORAL at 17:19

## 2021-03-25 RX ADMIN — OXYCODONE HYDROCHLORIDE AND ACETAMINOPHEN 1000 MG: 500 TABLET ORAL at 22:02

## 2021-03-25 RX ADMIN — APIXABAN 5 MG: 5 TABLET, FILM COATED ORAL at 09:29

## 2021-03-25 RX ADMIN — DEXAMETHASONE SODIUM PHOSPHATE 6 MG: 4 INJECTION, SOLUTION INTRA-ARTICULAR; INTRALESIONAL; INTRAMUSCULAR; INTRAVENOUS; SOFT TISSUE at 05:07

## 2021-03-25 RX ADMIN — APIXABAN 5 MG: 5 TABLET, FILM COATED ORAL at 17:19

## 2021-03-25 NOTE — ASSESSMENT & PLAN NOTE
· Noted by fever and tachypnea due to COVID-19 pneumonia  · The patient was admitted under mild pathway  · He was started on dexamethasone day 4/10 and remdesivir day 4/5  · Continue with COVID vitamins  · Anticoagulation- apixaban  · Trend inflammation markers  · Received 2 days of ceftriaxone and doxycycline; these were stopped as procalcitonin is negative x2  · Discussed convalescent plasma with patient but at this time he would like to wait  Handout given       Lab Results   Component Value Date    SARSCOV2 Positive (A) 03/21/2021    SARSCOV2 Not Detected 04/06/2020     Results from last 7 days   Lab Units 03/25/21  0508 03/24/21  0441 03/23/21  0456 03/22/21  0439 03/21/21  2344   D-DIMER QUANTITATIVE ug/ml FEU 0 39 0 43 0 50* 0 48  --    CRP mg/L 8 7* 15 7* 23 2*  --  33 0*   FERRITIN ng/mL 340 387 475*  --  628*     Results from last 7 days   Lab Units 03/23/21  0456 03/22/21  0439   PROCALCITONIN ng/ml <0 05 <0 05

## 2021-03-25 NOTE — PROGRESS NOTES
[address]  Progress Note - Tyler Juarez 1960, 61 y o  male MRN: [id number]  Unit/Bed#: -02 Encounter: [id number]  Primary Care Provider: Gabby Yuen MD   Date and time admitted to hospital: 3/21/2021 10:56 PM    * Sepsis due to COVID-19 [facility])  Assessment & Plan  · Noted by fever and tachypnea due to COVID-19 pneumonia  · The patient was admitted under mild pathway  · He was started on dexamethasone day 4/10 and remdesivir day 4/5  · Continue with COVID vitamins  · Anticoagulation- apixaban  · Trend inflammation markers  · Received 2 days of ceftriaxone and doxycycline; these were stopped as procalcitonin is negative x2  · Discussed convalescent plasma with patient but at this time he would like to wait  Handout given  Lab Results   Component Value Date    SARSCOV2 Positive (A) 03/21/2021    SARSCOV2 Not Detected 04/06/2020     Results from last 7 days   Lab Units 03/25/21  0508 03/24/21  0441 03/23/21  0456 03/22/21  0439 03/21/21  2344   D-DIMER QUANTITATIVE ug/ml FEU 0 39 0 43 0 50* 0 48  --    CRP mg/L 8 7* 15 7* 23 2*  --  33 0*   FERRITIN ng/mL 340 387 475*  --  628*     Results from last 7 days   Lab Units 03/23/21  0456 03/22/21  0439   PROCALCITONIN ng/ml <0 05 <0 05         Hyponatremia  Assessment & Plan  · Hypovolemia hyponatremia  · This is resolved with IV fluid    Acute respiratory failure with hypoxia (HCC)  Assessment & Plan  · Due to COVID-19 pneumonia  · Continue treatment outlined above  · Respiratory protocol  · Oxygen supplement to maintain SpO2 greater than 90-92%  · Titrate off oxygen as able; 4L -> 3L     Continues titrate off O2 as tolerated  · Encourage cough and deep breathing with incentive spirometry and early ambulation    History of DVT (deep vein thrombosis)  Assessment & Plan  · And history of pulmonary embolism   · Continue Eliquis 5 mg b i d      Moderate asthma without complication  Assessment & Plan  · With mild exacerbation due to COVID-19 infection  · Continue Symbicort and albuterol t i d /q i d  · Respiratory protocol   · Please see COVID treatment outline above    Essential hypertension  Assessment & Plan  · Continue lisinopril       Benign prostatic hyperplasia with lower urinary tract symptoms  Assessment & Plan  · Continue Proscar and Flomax            VTE Prophylaxis:  Apixaban (Eliquis)    Patient Centered Rounds: I have performed bedside rounds with nursing staff today  Discussions with Specialists or Other Care Team Provider: nursing, CM, RT  Education and Discussions with Family / Patient: patient  Offered to of the family however the patient would like to do so himself    Current Length of Stay: 3 day(s)    Current Patient Status: Inpatient   Certification Statement: The patient will continue to require additional inpatient hospital stay due to COVID-19    Discharge Plan:  Pending hospital course hopeful within the next 24-48 hours once we are able to titrate off oxygen    Code Status: Level 1 - Full Code    Subjective:   Feeling okay  He states that morning time is harder for him  He denies any dyspnea currently  He uses albuterol approximately 6:00 a m  Today  Objective:     Vitals:   Temp (24hrs), Av 8 °F (36 6 °C), Min:97 °F (36 1 °C), Max:98 4 °F (36 9 °C)    Temp:  [97 °F (36 1 °C)-98 4 °F (36 9 °C)] 97 9 °F (36 6 °C)  HR:  [62-70] 65  Resp:  [17-20] 20  BP: (138-156)/(72-82) 156/82  SpO2:  [93 %-96 %] 93 %  Body mass index is 36 24 kg/m²  Input and Output Summary (last 24 hours): Intake/Output Summary (Last 24 hours) at 3/25/2021 1004  Last data filed at 3/24/2021 1819  Gross per 24 hour   Intake 240 ml   Output --   Net 240 ml       Physical Exam:   Physical Exam  Vitals signs and nursing note reviewed  Constitutional:       General: He is not in acute distress  Appearance: Normal appearance  HENT:      Head: Normocephalic and atraumatic        Right Ear: External ear normal       Left Ear: External ear normal       Nose: Nose normal       Mouth/Throat:      Mouth: Mucous membranes are moist       Pharynx: Oropharynx is clear  Eyes:      General:         Right eye: No discharge  Left eye: No discharge  Extraocular Movements: Extraocular movements intact  Pupils: Pupils are equal, round, and reactive to light  Neck:      Musculoskeletal: Normal range of motion and neck supple  No neck rigidity  Cardiovascular:      Rate and Rhythm: Normal rate and regular rhythm  Pulses: Normal pulses  Heart sounds: Normal heart sounds  No murmur  Pulmonary:      Effort: Pulmonary effort is normal  No respiratory distress  Breath sounds: Rales (LLL) present  No wheezing  Comments: Decreased      Abdominal:      General: Bowel sounds are normal  There is no distension  Palpations: Abdomen is soft  There is no mass  Tenderness: There is no abdominal tenderness  Musculoskeletal: Normal range of motion  General: No swelling, tenderness or deformity  Skin:     General: Skin is warm and dry  Capillary Refill: Capillary refill takes less than 2 seconds  Coloration: Skin is not pale  Findings: No erythema  Neurological:      General: No focal deficit present  Mental Status: He is alert and oriented to person, place, and time  Mental status is at baseline  Psychiatric:         Mood and Affect: Mood normal          Behavior: Behavior normal          Thought Content: Thought content normal          Judgment: Judgment normal          Additional Data:     Labs:    Results from last 7 days   Lab Units 03/25/21  0508  03/21/21  2344   WBC Thousand/uL 8 80   < > 5 00   HEMOGLOBIN g/dL 13 6*   < > 14 8   HEMATOCRIT % 38 7*   < > 43 3   PLATELETS Thousands/uL 216   < > 152   NEUTROS PCT %  --   --  74   LYMPHS PCT %  --   --  15*   MONOS PCT %  --   --  11   EOS PCT %  --   --  0    < > = values in this interval not displayed       Results from last 7 days   Lab Units 03/25/21  0508   SODIUM mmol/L 140   POTASSIUM mmol/L 3 7   CHLORIDE mmol/L 106   CO2 mmol/L 24   BUN mg/dL 25   CREATININE mg/dL 0 72   CALCIUM mg/dL 8 6   ALK PHOS U/L 28*   ALT U/L 53*   AST U/L 39                   * I Have Reviewed All Lab Data Listed Above  * Additional Pertinent Lab Tests Reviewed: Rebeca 66 Admission  Reviewed    Imaging:  Imaging Reports Reviewed Today Include: n/a    Recent Cultures (last 7 days):           Last 24 Hours Medication List:   Current Facility-Administered Medications   Medication Dose Route Frequency Provider Last Rate    acetaminophen  650 mg Oral Q4H PRN Vergia Pellet, PA-C      albuterol  2 puff Inhalation Q6H PRN Elaine Garcia MD      apixaban  5 mg Oral BID Vergia Pellet, PA-C      ascorbic acid  1,000 mg Oral Q12H [facility] Vergia Pellet, PA-C      budesonide-formoterol  2 puff Inhalation BID Vergia Pellet, PA-C      cholecalciferol  2,000 Units Oral Daily Vergia Pellet, PA-C      dexamethasone  6 mg Intravenous Q24H Vergia Pellet, PA-C      famotidine  20 mg Oral BID Vergia Pellet, PA-C      finasteride  5 mg Oral Daily Vergia Pellet, PA-C      fish oil  1,000 mg Oral BID Vergia Pellet, PA-C      gabapentin  300 mg Oral HS Vergia Pellet, PA-C      lisinopril  40 mg Oral Daily Vergia Pellet, PA-C      zinc sulfate  220 mg Oral Daily Vergia Pellet, PA-C      Followed by   Whit Temple ON 3/29/2021] multivitamin-minerals  1 tablet Oral Daily Vergia Pellet, PA-C      ondansetron  4 mg Intravenous Q6H PRN Vergia Pellet, PA-C      remdesivir  100 mg Intravenous Q24H Vergia Pellet, PA-C      tamsulosin  0 4 mg Oral BID Vergia Pellet, PA-C          Today, Patient Was Seen By: LIS Angulo    ** Please Note: Dictation voice to text software may have been used in the creation of this document   **

## 2021-03-25 NOTE — PLAN OF CARE
Problem: Potential for Falls  Goal: Patient will remain free of falls  Description: INTERVENTIONS:  - Assess patient frequently for physical needs  -  Identify cognitive and physical deficits and behaviors that affect risk of falls    -  [place] fall precautions as indicated by assessment   - Educate patient/family on patient safety including physical limitations  - Instruct patient to call for assistance with activity based on assessment  - Modify environment to reduce risk of injury  - Consider OT/PT consult to assist with strengthening/mobility  Outcome: Progressing     Problem: PAIN - ADULT  Goal: Verbalizes/displays adequate comfort level or baseline comfort level  Description: Interventions:  - Encourage patient to monitor pain and request assistance  - Assess pain using appropriate pain scale  - Administer analgesics based on type and severity of pain and evaluate response  - Implement non-pharmacological measures as appropriate and evaluate response  - Consider cultural and social influences on pain and pain management  - Notify physician/advanced practitioner if interventions unsuccessful or patient reports new pain  Outcome: Progressing     Problem: INFECTION - ADULT  Goal: Absence or prevention of progression during hospitalization  Description: INTERVENTIONS:  - Assess and monitor for signs and symptoms of infection  - Monitor lab/diagnostic results  - Monitor all insertion sites, i e  indwelling lines, tubes, and drains  - Monitor endotracheal if appropriate and nasal secretions for changes in amount and color  - [place] appropriate cooling/warming therapies per order  - Administer medications as ordered  - Instruct and encourage patient and family to use good hand hygiene technique  - Identify and instruct in appropriate isolation precautions for identified infection/condition  Outcome: Progressing  Goal: Absence of fever/infection during neutropenic period  Description: INTERVENTIONS:  - Monitor WBC    Outcome: Progressing     Problem: SAFETY ADULT  Goal: Maintain or return to baseline ADL function  Description: INTERVENTIONS:  -  Assess patient's ability to carry out ADLs; assess patient's baseline for ADL function and identify physical deficits which impact ability to perform ADLs (bathing, care of mouth/teeth, toileting, grooming, dressing, etc )  - Assess/evaluate cause of self-care deficits   - Assess range of motion  - Assess patient's mobility; develop plan if impaired  - Assess patient's need for assistive devices and provide as appropriate  - Encourage maximum independence but intervene and supervise when necessary  - Involve family in performance of ADLs  - Assess for home care needs following discharge   - Consider OT consult to assist with ADL evaluation and planning for discharge  - Provide patient education as appropriate  Outcome: Progressing  Goal: Maintain or return mobility status to optimal level  Description: INTERVENTIONS:  - Assess patient's baseline mobility status (ambulation, transfers, stairs, etc )    - Identify cognitive and physical deficits and behaviors that affect mobility  - Identify mobility aids required to assist with transfers and/or ambulation (gait belt, sit-to-stand, lift, walker, cane, etc )  - [place] fall precautions as indicated by assessment  - Record patient progress and toleration of activity level on Mobility SBAR; progress patient to next Phase/Stage  - Instruct patient to call for assistance with activity based on assessment  - Consider rehabilitation consult to assist with strengthening/weightbearing, etc   Outcome: Progressing     Problem: DISCHARGE PLANNING  Goal: Discharge to home or other facility with appropriate resources  Description: INTERVENTIONS:  - Identify barriers to discharge w/patient and caregiver  - Arrange for needed discharge resources and transportation as appropriate  - Identify discharge learning needs (meds, wound care, etc )  - Arrange for interpretive services to assist at discharge as needed  - Refer to Case Management Department for coordinating discharge planning if the patient needs post-hospital services based on physician/advanced practitioner order or complex needs related to functional status, cognitive ability, or social support system  Outcome: Progressing     Problem: Knowledge Deficit  Goal: Patient/family/caregiver demonstrates understanding of disease process, treatment plan, medications, and discharge instructions  Description: Complete learning assessment and assess knowledge base  Interventions:  - Provide teaching at level of understanding  - Provide teaching via preferred learning methods  Outcome: Progressing     Problem: Nutrition/Hydration-ADULT  Goal: Nutrient/Hydration intake appropriate for improving, restoring or maintaining nutritional needs  Description: Monitor and assess patient's nutrition/hydration status for malnutrition  Collaborate with interdisciplinary team and initiate plan and interventions as ordered  Monitor patient's weight and dietary intake as ordered or per policy  Utilize nutrition screening tool and intervene as necessary  Determine patient's food preferences and provide high-protein, high-caloric foods as appropriate       INTERVENTIONS:  - Monitor oral intake, urinary output, labs, and treatment plans  - Assess nutrition and hydration status and recommend course of action  - Evaluate amount of meals eaten  - Assist patient with eating if necessary   - Allow adequate time for meals  - Recommend/ encourage appropriate diets, oral nutritional supplements, and vitamin/mineral supplements  - Order, calculate, and assess calorie counts as needed  - Recommend, monitor, and adjust tube feedings and TPN/PPN based on assessed needs  - Assess need for intravenous fluids  - Provide specific nutrition/hydration education as appropriate  - Include patient/family/caregiver in decisions related to nutrition  Outcome: Progressing

## 2021-03-25 NOTE — ASSESSMENT & PLAN NOTE
· Due to COVID-19 pneumonia  · Continue treatment outlined above  · Respiratory protocol  · Oxygen supplement to maintain SpO2 greater than 90-92%  · Titrate off oxygen as able; 4L -> 3L     Continues titrate off O2 as tolerated  · Encourage cough and deep breathing with incentive spirometry and early ambulation

## 2021-03-26 PROBLEM — E87.1 HYPONATREMIA: Status: RESOLVED | Noted: 2021-03-25 | Resolved: 2021-03-26

## 2021-03-26 LAB
ALBUMIN SERPL BCP-MCNC: 3.3 G/DL (ref 3.5–5.7)
ALP SERPL-CCNC: 27 U/L (ref 55–165)
ALT SERPL W P-5'-P-CCNC: 47 U/L (ref 7–52)
ANION GAP SERPL CALCULATED.3IONS-SCNC: 10 MMOL/L (ref 4–13)
AST SERPL W P-5'-P-CCNC: 32 U/L (ref 13–39)
BILIRUB SERPL-MCNC: 0.6 MG/DL (ref 0.2–1)
BUN SERPL-MCNC: 23 MG/DL (ref 7–25)
CALCIUM ALBUM COR SERPL-MCNC: 9.1 MG/DL (ref 8.3–10.1)
CALCIUM SERPL-MCNC: 8.5 MG/DL (ref 8.6–10.5)
CHLORIDE SERPL-SCNC: 105 MMOL/L (ref 98–107)
CO2 SERPL-SCNC: 24 MMOL/L (ref 21–31)
CREAT SERPL-MCNC: 0.72 MG/DL (ref 0.7–1.3)
CRP SERPL QL: 6.1 MG/L
D DIMER PPP FEU-MCNC: 0.38 UG/ML FEU
ERYTHROCYTE [DISTWIDTH] IN BLOOD BY AUTOMATED COUNT: 13.4 % (ref 11.5–14.5)
FERRITIN SERPL-MCNC: 330 NG/ML (ref 8–388)
GFR SERPL CREATININE-BSD FRML MDRD: 101 ML/MIN/1.73SQ M
GLUCOSE SERPL-MCNC: 112 MG/DL (ref 65–99)
HCT VFR BLD AUTO: 38.7 % (ref 42–47)
HGB BLD-MCNC: 13.3 G/DL (ref 14–18)
MCH RBC QN AUTO: 31.8 PG (ref 26–34)
MCHC RBC AUTO-ENTMCNC: 34.4 G/DL (ref 31–37)
MCV RBC AUTO: 93 FL (ref 81–99)
PLATELET # BLD AUTO: 241 THOUSANDS/UL (ref 149–390)
PMV BLD AUTO: 8.8 FL (ref 8.6–11.7)
POTASSIUM SERPL-SCNC: 3.9 MMOL/L (ref 3.5–5.5)
PROT SERPL-MCNC: 6.3 G/DL (ref 6.4–8.9)
RBC # BLD AUTO: 4.18 MILLION/UL (ref 4.3–5.9)
SODIUM SERPL-SCNC: 139 MMOL/L (ref 134–143)
WBC # BLD AUTO: 9.7 THOUSAND/UL (ref 4.8–10.8)

## 2021-03-26 PROCEDURE — 80053 COMPREHEN METABOLIC PANEL: CPT | Performed by: NURSE PRACTITIONER

## 2021-03-26 PROCEDURE — 86140 C-REACTIVE PROTEIN: CPT | Performed by: NURSE PRACTITIONER

## 2021-03-26 PROCEDURE — 85027 COMPLETE CBC AUTOMATED: CPT | Performed by: NURSE PRACTITIONER

## 2021-03-26 PROCEDURE — 99232 SBSQ HOSP IP/OBS MODERATE 35: CPT | Performed by: NURSE PRACTITIONER

## 2021-03-26 PROCEDURE — 82728 ASSAY OF FERRITIN: CPT | Performed by: NURSE PRACTITIONER

## 2021-03-26 PROCEDURE — 85379 FIBRIN DEGRADATION QUANT: CPT | Performed by: NURSE PRACTITIONER

## 2021-03-26 RX ADMIN — GABAPENTIN 300 MG: 300 CAPSULE ORAL at 21:19

## 2021-03-26 RX ADMIN — FAMOTIDINE 20 MG: 20 TABLET ORAL at 17:08

## 2021-03-26 RX ADMIN — DEXAMETHASONE SODIUM PHOSPHATE 6 MG: 4 INJECTION, SOLUTION INTRA-ARTICULAR; INTRALESIONAL; INTRAMUSCULAR; INTRAVENOUS; SOFT TISSUE at 05:02

## 2021-03-26 RX ADMIN — APIXABAN 5 MG: 5 TABLET, FILM COATED ORAL at 17:08

## 2021-03-26 RX ADMIN — LISINOPRIL 40 MG: 20 TABLET ORAL at 08:25

## 2021-03-26 RX ADMIN — ZINC SULFATE 220 MG (50 MG) CAPSULE 220 MG: CAPSULE at 08:25

## 2021-03-26 RX ADMIN — OXYCODONE HYDROCHLORIDE AND ACETAMINOPHEN 1000 MG: 500 TABLET ORAL at 08:25

## 2021-03-26 RX ADMIN — Medication 2000 UNITS: at 08:25

## 2021-03-26 RX ADMIN — APIXABAN 5 MG: 5 TABLET, FILM COATED ORAL at 08:25

## 2021-03-26 RX ADMIN — OMEGA-3 FATTY ACIDS CAP 1000 MG 1000 MG: 1000 CAP at 08:25

## 2021-03-26 RX ADMIN — FINASTERIDE 5 MG: 5 TABLET, FILM COATED ORAL at 08:25

## 2021-03-26 RX ADMIN — REMDESIVIR 100 MG: 100 INJECTION, POWDER, LYOPHILIZED, FOR SOLUTION INTRAVENOUS at 09:59

## 2021-03-26 RX ADMIN — OXYCODONE HYDROCHLORIDE AND ACETAMINOPHEN 1000 MG: 500 TABLET ORAL at 21:19

## 2021-03-26 RX ADMIN — OMEGA-3 FATTY ACIDS CAP 1000 MG 1000 MG: 1000 CAP at 17:08

## 2021-03-26 RX ADMIN — BUDESONIDE AND FORMOTEROL FUMARATE DIHYDRATE 2 PUFF: 160; 4.5 AEROSOL RESPIRATORY (INHALATION) at 09:00

## 2021-03-26 RX ADMIN — FAMOTIDINE 20 MG: 20 TABLET ORAL at 08:25

## 2021-03-26 RX ADMIN — TAMSULOSIN HYDROCHLORIDE 0.4 MG: 0.4 CAPSULE ORAL at 17:08

## 2021-03-26 RX ADMIN — TAMSULOSIN HYDROCHLORIDE 0.4 MG: 0.4 CAPSULE ORAL at 08:25

## 2021-03-26 NOTE — PLAN OF CARE
Problem: Potential for Falls  Goal: Patient will remain free of falls  Description: INTERVENTIONS:  - Assess patient frequently for physical needs  -  Identify cognitive and physical deficits and behaviors that affect risk of falls    -  [place] fall precautions as indicated by assessment   - Educate patient/family on patient safety including physical limitations  - Instruct patient to call for assistance with activity based on assessment  - Modify environment to reduce risk of injury  - Consider OT/PT consult to assist with strengthening/mobility  Outcome: Progressing     Problem: PAIN - ADULT  Goal: Verbalizes/displays adequate comfort level or baseline comfort level  Description: Interventions:  - Encourage patient to monitor pain and request assistance  - Assess pain using appropriate pain scale  - Administer analgesics based on type and severity of pain and evaluate response  - Implement non-pharmacological measures as appropriate and evaluate response  - Consider cultural and social influences on pain and pain management  - Notify physician/advanced practitioner if interventions unsuccessful or patient reports new pain  Outcome: Progressing     Problem: INFECTION - ADULT  Goal: Absence or prevention of progression during hospitalization  Description: INTERVENTIONS:  - Assess and monitor for signs and symptoms of infection  - Monitor lab/diagnostic results  - Monitor all insertion sites, i e  indwelling lines, tubes, and drains  - Monitor endotracheal if appropriate and nasal secretions for changes in amount and color  - [place] appropriate cooling/warming therapies per order  - Administer medications as ordered  - Instruct and encourage patient and family to use good hand hygiene technique  - Identify and instruct in appropriate isolation precautions for identified infection/condition  Outcome: Progressing  Goal: Absence of fever/infection during neutropenic period  Description: INTERVENTIONS:  - Monitor WBC    Outcome: Progressing     Problem: SAFETY ADULT  Goal: Maintain or return to baseline ADL function  Description: INTERVENTIONS:  -  Assess patient's ability to carry out ADLs; assess patient's baseline for ADL function and identify physical deficits which impact ability to perform ADLs (bathing, care of mouth/teeth, toileting, grooming, dressing, etc )  - Assess/evaluate cause of self-care deficits   - Assess range of motion  - Assess patient's mobility; develop plan if impaired  - Assess patient's need for assistive devices and provide as appropriate  - Encourage maximum independence but intervene and supervise when necessary  - Involve family in performance of ADLs  - Assess for home care needs following discharge   - Consider OT consult to assist with ADL evaluation and planning for discharge  - Provide patient education as appropriate  Outcome: Progressing  Goal: Maintain or return mobility status to optimal level  Description: INTERVENTIONS:  - Assess patient's baseline mobility status (ambulation, transfers, stairs, etc )    - Identify cognitive and physical deficits and behaviors that affect mobility  - Identify mobility aids required to assist with transfers and/or ambulation (gait belt, sit-to-stand, lift, walker, cane, etc )  - [place] fall precautions as indicated by assessment  - Record patient progress and toleration of activity level on Mobility SBAR; progress patient to next Phase/Stage  - Instruct patient to call for assistance with activity based on assessment  - Consider rehabilitation consult to assist with strengthening/weightbearing, etc   Outcome: Progressing     Problem: DISCHARGE PLANNING  Goal: Discharge to home or other facility with appropriate resources  Description: INTERVENTIONS:  - Identify barriers to discharge w/patient and caregiver  - Arrange for needed discharge resources and transportation as appropriate  - Identify discharge learning needs (meds, wound care, etc )  - Arrange for interpretive services to assist at discharge as needed  - Refer to Case Management Department for coordinating discharge planning if the patient needs post-hospital services based on physician/advanced practitioner order or complex needs related to functional status, cognitive ability, or social support system  Outcome: Progressing     Problem: Knowledge Deficit  Goal: Patient/family/caregiver demonstrates understanding of disease process, treatment plan, medications, and discharge instructions  Description: Complete learning assessment and assess knowledge base  Interventions:  - Provide teaching at level of understanding  - Provide teaching via preferred learning methods  Outcome: Progressing     Problem: Nutrition/Hydration-ADULT  Goal: Nutrient/Hydration intake appropriate for improving, restoring or maintaining nutritional needs  Description: Monitor and assess patient's nutrition/hydration status for malnutrition  Collaborate with interdisciplinary team and initiate plan and interventions as ordered  Monitor patient's weight and dietary intake as ordered or per policy  Utilize nutrition screening tool and intervene as necessary  Determine patient's food preferences and provide high-protein, high-caloric foods as appropriate       INTERVENTIONS:  - Monitor oral intake, urinary output, labs, and treatment plans  - Assess nutrition and hydration status and recommend course of action  - Evaluate amount of meals eaten  - Assist patient with eating if necessary   - Allow adequate time for meals  - Recommend/ encourage appropriate diets, oral nutritional supplements, and vitamin/mineral supplements  - Order, calculate, and assess calorie counts as needed  - Recommend, monitor, and adjust tube feedings and TPN/PPN based on assessed needs  - Assess need for intravenous fluids  - Provide specific nutrition/hydration education as appropriate  - Include patient/family/caregiver in decisions related to nutrition  Outcome: Progressing

## 2021-03-26 NOTE — ASSESSMENT & PLAN NOTE
· Due to COVID-19 pneumonia  · Continue treatment outlined above  · Respiratory protocol  · Oxygen supplement to maintain SpO2 greater than 90-92%  · Titrate off oxygen as able; 4L -> 2L     Continues titrate off O2 as tolerated  · Encourage cough and deep breathing with incentive spirometry and early ambulation

## 2021-03-26 NOTE — PROGRESS NOTES
[facility]  Progress Note - Kalee Escamilla 1960, 61 y o  male MRN: [id number]  Unit/Bed#: -02 Encounter: [id number]  Primary Care Provider: Keith Fontanez MD   Date and time admitted to hospital: 3/21/2021 10:56 PM    * Sepsis due to COVID-19 [facility])  Assessment & Plan  · Noted by fever and tachypnea due to COVID-19 pneumonia  · The patient was admitted under mild pathway  · He was started on dexamethasone day 5/10 and remdesivir finished course 3/26/21  · Continue with COVID vitamins  · Anticoagulation- apixaban  · Trend inflammation markers  · Received 2 days of ceftriaxone and doxycycline; these were stopped as procalcitonin is negative x2  · Discussed convalescent plasma with patient does not want it at this time  Handout given  Lab Results   Component Value Date    SARSCOV2 Positive (A) 03/21/2021    SARSCOV2 Not Detected 04/06/2020     Results from last 7 days   Lab Units 03/26/21  0514 03/25/21  0508 03/24/21  0441 03/23/21  0456 03/22/21  0439 03/21/21  2344   D-DIMER QUANTITATIVE ug/ml FEU 0 38 0 39 0 43 0 50* 0 48  --    CRP mg/L 6 1 8 7* 15 7* 23 2*  --  33 0*   FERRITIN ng/mL 330 340 387 475*  --  628*     Results from last 7 days   Lab Units 03/23/21  0456 03/22/21  0439   PROCALCITONIN ng/ml <0 05 <0 05         Acute respiratory failure with hypoxia (HCC)  Assessment & Plan  · Due to COVID-19 pneumonia  · Continue treatment outlined above  · Respiratory protocol  · Oxygen supplement to maintain SpO2 greater than 90-92%  · Titrate off oxygen as able; 4L -> 2L     Continues titrate off O2 as tolerated  · Encourage cough and deep breathing with incentive spirometry and early ambulation    History of DVT (deep vein thrombosis)  Assessment & Plan  · And history of pulmonary embolism   · Continue Eliquis 5 mg b i d       Moderate asthma without complication  Assessment & Plan  · With mild exacerbation due to COVID-19 infection  · Continue Symbicort and albuterol t i d /q i d  · Respiratory protocol   · Please see COVID treatment outline above     Essential hypertension  Assessment & Plan  · Continue lisinopril        Benign prostatic hyperplasia with lower urinary tract symptoms  Assessment & Plan  · Continue Proscar and Flomax         Hyponatremia-resolved as of 3/26/2021  Assessment & Plan  · Hypovolemia hyponatremia  · This is resolved with IV fluid         VTE Prophylaxis:  Apixaban (Eliquis)    Patient Centered Rounds: I have performed bedside rounds with nursing staff today  Discussions with Specialists or Other Care Team Provider:  Nursing, case management, RT  Education and Discussions with Family / Patient:  Patient  Offered to update family however the patient would like to do so himself    Current Length of Stay: 4 day(s)    Current Patient Status: Inpatient   Certification Statement: The patient will continue to require additional inpatient hospital stay due to COVID-19 pneumonia    Discharge Plan:  Pending hospital course    Code Status: Level 1 - Full Code    Subjective:   Feeling slightly better  Patient however reports some dyspnea with ambulation  Able to tolerate 2 L nasal cannula  Objective:     Vitals:   Temp (24hrs), Av 3 °F (36 3 °C), Min:96 9 °F (36 1 °C), Max:98 °F (36 7 °C)    Temp:  [96 9 °F (36 1 °C)-98 °F (36 7 °C)] 96 9 °F (36 1 °C)  HR:  [] 107  Resp:  [13-22] 22  BP: (140-147)/(81-84) 147/84  SpO2:  [90 %-95 %] 90 %  Body mass index is 36 24 kg/m²  Input and Output Summary (last 24 hours):     No intake or output data in the 24 hours ending 21 7837    Physical Exam:   Physical Exam  Vitals signs and nursing note reviewed  Constitutional:       General: He is not in acute distress  Appearance: Normal appearance  HENT:      Head: Normocephalic and atraumatic        Right Ear: External ear normal       Left Ear: External ear normal       Nose: Nose normal       Mouth/Throat:      Mouth: Mucous membranes are moist       Pharynx: Oropharynx is clear  Eyes:      General:         Right eye: No discharge  Left eye: No discharge  Extraocular Movements: Extraocular movements intact  Pupils: Pupils are equal, round, and reactive to light  Neck:      Musculoskeletal: Normal range of motion and neck supple  No neck rigidity  Cardiovascular:      Rate and Rhythm: Normal rate and regular rhythm  Pulses: Normal pulses  Heart sounds: Normal heart sounds  No murmur  Pulmonary:      Effort: Pulmonary effort is normal  No respiratory distress  Breath sounds: No wheezing or rales  Comments: Decreased  Abdominal:      General: Bowel sounds are normal  There is no distension  Palpations: Abdomen is soft  There is no mass  Tenderness: There is no abdominal tenderness  Musculoskeletal: Normal range of motion  General: No swelling, tenderness or deformity  Skin:     General: Skin is warm and dry  Capillary Refill: Capillary refill takes less than 2 seconds  Coloration: Skin is not pale  Findings: No erythema  Neurological:      General: No focal deficit present  Mental Status: He is alert and oriented to person, place, and time  Mental status is at baseline  Psychiatric:         Mood and Affect: Mood normal          Behavior: Behavior normal          Thought Content: Thought content normal          Judgment: Judgment normal          Additional Data:     Labs:    Results from last 7 days   Lab Units 03/26/21  0514  03/21/21  2344   WBC Thousand/uL 9 70   < > 5 00   HEMOGLOBIN g/dL 13 3*   < > 14 8   HEMATOCRIT % 38 7*   < > 43 3   PLATELETS Thousands/uL 241   < > 152   NEUTROS PCT %  --   --  74   LYMPHS PCT %  --   --  15*   MONOS PCT %  --   --  11   EOS PCT %  --   --  0    < > = values in this interval not displayed       Results from last 7 days   Lab Units 03/26/21  0514   SODIUM mmol/L 139   POTASSIUM mmol/L 3 9   CHLORIDE mmol/L 105   CO2 mmol/L 24   BUN mg/dL 23   CREATININE mg/dL 0 72   CALCIUM mg/dL 8 5*   ALK PHOS U/L 27*   ALT U/L 47   AST U/L 32                   * I Have Reviewed All Lab Data Listed Above  * Additional Pertinent Lab Tests Reviewed: Rebeca 66 Admission  Reviewed    Imaging:  Imaging Reports Reviewed Today Include:  n/a    Recent Cultures (last 7 days):           Last 24 Hours Medication List:   Current Facility-Administered Medications   Medication Dose Route Frequency Provider Last Rate    acetaminophen  650 mg Oral Q4H PRN Justin Brantley PA-C      albuterol  2 puff Inhalation Q6H PRN Brit Zimmerman MD      apixaban  5 mg Oral BID Justin BeaglLILA donovan      ascorbic acid  1,000 mg Oral Q12H [address] Justin BeaglLILA donovan      budesonide-formoterol  2 puff Inhalation BID Justin BeKRYSTLE sandoval-WILLIAM      cholecalciferol  2,000 Units Oral Daily Justin BeaglKRYSTLE donovan-WILLIAM      dexamethasone  6 mg Intravenous Q24H Justin BeaglLILA donovan      famotidine  20 mg Oral BID Justin BeagleLILA      finasteride  5 mg Oral Daily Justin BeaglKRYSTLE donovan-WILLIAM      fish oil  1,000 mg Oral BID Justin BeagleLILA      gabapentin  300 mg Oral HS Justin BeaglLILA donovan      lisinopril  40 mg Oral Daily Justin BeLILA sandoval      zinc sulfate  220 mg Oral Daily Justin Brantley PA-C      Followed by   Ana Hicks ON 3/29/2021] multivitamin-minerals  1 tablet Oral Daily Justin BeLILA sandoval      ondansetron  4 mg Intravenous Q6H PRN Justin BeLILA sandoval      tamsulosin  0 4 mg Oral BID Justin Brantley PA-C          Today, Patient Was Seen By: LIS Galan    ** Please Note: Dictation voice to text software may have been used in the creation of this document   **

## 2021-03-26 NOTE — ASSESSMENT & PLAN NOTE
· Noted by fever and tachypnea due to COVID-19 pneumonia  · The patient was admitted under mild pathway  · He was started on dexamethasone day 5/10 and remdesivir finished course 3/26/21  · Continue with COVID vitamins  · Anticoagulation- apixaban  · Trend inflammation markers  · Received 2 days of ceftriaxone and doxycycline; these were stopped as procalcitonin is negative x2  · Discussed convalescent plasma with patient does not want it at this time  Handout given        Lab Results   Component Value Date    SARSCOV2 Positive (A) 03/21/2021    SARSCOV2 Not Detected 04/06/2020     Results from last 7 days   Lab Units 03/26/21  0514 03/25/21  0508 03/24/21  0441 03/23/21  0456 03/22/21  0439 03/21/21  2344   D-DIMER QUANTITATIVE ug/ml FEU 0 38 0 39 0 43 0 50* 0 48  --    CRP mg/L 6 1 8 7* 15 7* 23 2*  --  33 0*   FERRITIN ng/mL 330 340 387 475*  --  628*     Results from last 7 days   Lab Units 03/23/21  0456 03/22/21  0439   PROCALCITONIN ng/ml <0 05 <0 05

## 2021-03-26 NOTE — UTILIZATION REVIEW
Continued Stay Review    Date: 3/26/2021                          Current Patient Class: INPT Current Level of Care: MED-SURG    HPI:60 y o  male initially admitted INPT on 3/22 D/T SEPSIS D/T COVID 19 PNEUMONIA  Assessment/Plan: D-DIMER 0 50 ON 3/23  TODAY 0 39  CRP 23 2 ON 3/23, TODAY 8 7  FERRITIN 475 ON 3/23, TODAY 340  dexamethasone and remdesivir  covid VITAMINS  apixaban nebs  ANTIBITOICS D/C'D  [email or web address]  IS  C&DB      Pertinent Labs/Diagnostic Results:   Results from last 7 days   Lab Units 03/21/21  2356   SARS-COV-2  Positive*     Results from last 7 days   Lab Units 03/26/21  0514 03/25/21  0508 03/24/21  0441 03/23/21  0456 03/21/21  2344   WBC Thousand/uL 9 70 8 80 6 00 6 20 5 00   HEMOGLOBIN g/dL 13 3* 13 6* 13 4* 13 1* 14 8   HEMATOCRIT % 38 7* 38 7* 38 8* 38 7* 43 3   PLATELETS Thousands/uL 241 216 180 153 152   NEUTROS ABS Thousands/µL  --   --   --   --  3 70         Results from last 7 days   Lab Units 03/26/21  0514 03/25/21  0508 03/24/21  0441 03/23/21  0456 03/22/21  0439 03/21/21  2344   SODIUM mmol/L 139 140 140 138 136 133*   POTASSIUM mmol/L 3 9 3 7 3 6 4 0 3 3* 3 5   CHLORIDE mmol/L 105 106 106 106 102 99   CO2 mmol/L 24 24 25 23 22 23   ANION GAP mmol/L 10 10 9 9 12 11   BUN mg/dL 23 25 23 22 17 19   CREATININE mg/dL 0 72 0 72 0 82 0 90 0 78 0 89   EGFR ml/min/1 73sq m 101 101 96 93 98 93   CALCIUM mg/dL 8 5* 8 6 8 9 8 4* 8 4* 8 9   MAGNESIUM mg/dL  --   --   --   --   --  2 1     Results from last 7 days   Lab Units 03/26/21  0514 03/25/21  0508 03/24/21  0441 03/23/21  0456 03/22/21  0439   AST U/L 32 39 52* 58* 47*   ALT U/L 47 53* 55* 50 34   ALK PHOS U/L 27* 28* 27* 25* 29*   TOTAL PROTEIN g/dL 6 3* 6 4 6 6 6 4 7 0   ALBUMIN g/dL 3 3* 3 4* 3 6 3 4* 3 8   TOTAL BILIRUBIN mg/dL 0 60 0 60 0 50 0 50 0 70         Results from last 7 days   Lab Units 03/26/21  0514 03/25/21  0508 03/24/21  0441 03/23/21  0456 03/22/21  0439 03/21/21  2344   GLUCOSE RANDOM mg/dL 112* 118* 110* 103* 112* 120* Results from last 7 days   Lab Units 03/21/21  2344   TROPONIN I ng/mL <0 03     Results from last 7 days   Lab Units 03/26/21  0514 03/25/21  0508 03/24/21  0441 03/23/21  0456 03/22/21  0439   D-DIMER QUANTITATIVE ug/ml FEU 0 38 0 39 0 43 0 50* 0 48       Results from last 7 days   Lab Units 03/23/21  0456 03/22/21  0439   PROCALCITONIN ng/ml <0 05 <0 05       Results from last 7 days   Lab Units 03/21/21  2344   BNP pg/mL 46     Results from last 7 days   Lab Units 03/26/21  0514 03/25/21  0508 03/24/21  0441   FERRITIN ng/mL 330 340 387       Results from last 7 days   Lab Units 03/26/21  0514 03/25/21  0508 03/24/21  0441 03/23/21  0456 03/21/21  2344   CRP mg/L 6 1 8 7* 15 7* 23 2* 33 0*         Results from last 7 days   Lab Units 03/22/21  0456   CLARITY UA  Clear   COLOR UA  Yellow   SPEC GRAV UA  1 020   PH UA  6 0   GLUCOSE UA mg/dl Negative   KETONES UA mg/dl Negative   BLOOD UA  Trace-Intact*   PROTEIN UA mg/dl Negative   NITRITE UA  Negative   BILIRUBIN UA  Negative   UROBILINOGEN UA E U /dl 0 2   LEUKOCYTES UA  Negative   WBC UA /hpf 1-2   RBC UA /hpf 1-2   BACTERIA UA /hpf None Seen   EPITHELIAL CELLS WET PREP /hpf Occasional   MUCUS THREADS  Moderate*     Results from last 7 days   Lab Units 03/21/21  2356   INFLUENZA A PCR  Negative   INFLUENZA B PCR  Negative   RSV PCR  Negative       Vital Signs:   03/26/21 0747  97 1 °F (36 2 °C)Abnormal   61  18  145/81  --  92 %  28  2 L/min  Nasal cannula  Lying   03/25/21 2300  98 °F (36 7 °C)  65  13  140/81  106  92 %  28  2 L/min  Nasal cannula  Lying   03/25/21 2003  --  --  --  --  --  95 %  32  3 L/min  Nasal cannula  --   03/25/21 1552  97 1 °F (36 2 °C)Abnormal   71  27Abnormal   122/80  --  95 %  --  --  --  Lying   03/25/21 1211  --  --  --  --  --  93 %  32  3 L/min   Nasal cannula  --   Nasal Cannula O2 Flow Rate (L/min): decreased to 2L at this time at 03/25/21 1211   03/25/21 0833  97 9 °F (36 6 °C)  65  20  156/82  --  93 %  32  3 L/min None (Room air)  Lying   03/24/21 2300  98 4 °F (36 9 °C)  70  18  154/72  104  94 %  32  3 L/min  Nasal cannula  Lying   03/24/21 2040  --  --  --  --  --  94 %  32  3 L/min  Nasal cannula  --   03/24/21 1715  97 °F (36 1 °C)Abnormal   62  17  138/73  --  94 %   32  3 L/min  Nasal cannula  Lying   SpO2: wrong value entered at 03/24/21 1715   03/24/21 1439  --  --  --  --  --  96 %  32  3 L/min  Nasal cannula  --   03/24/21 0814  96 4 °F (35 8 °C)Abnormal   61  16  135/71  98  93 %  36  4 L/min  Nasal c       [email or web address] :97 7-64-/78-96% ON RA  [email or web address]: 93% ON [email or web address]      Medications:   Scheduled Medications:  apixaban, 5 mg, Oral, BID  ascorbic acid, 1,000 mg, Oral, Q12H KAMI  budesonide-formoterol, 2 puff, Inhalation, BID  cholecalciferol, 2,000 Units, Oral, Daily  dexamethasone, 6 mg, Intravenous, Q24H  famotidine, 20 mg, Oral, BID  finasteride, 5 mg, Oral, Daily  fish oil, 1,000 mg, Oral, BID  gabapentin, 300 mg, Oral, HS  lisinopril, 40 mg, Oral, Daily  zinc sulfate, 220 mg, Oral, Daily    Followed by  Cruz Arvizu ON 3/29/2021] multivitamin-minerals, 1 tablet, Oral, Daily  tamsulosin, 0 4 mg, Oral, BID      PRN Meds:  acetaminophen, 650 mg, Oral, Q4H PRN 3/23 X 1, 3/24 X 1  albuterol, 2 puff, Inhalation, Q6H PRN  ondansetron, 4 mg, Intravenous, Q6H PRN        Discharge Plan: D    Network Utilization Review Department  ATTENTION: Please call with any questions or concerns to [phone] and carefully listen to the prompts so that you are directed to the right person  All voicemails are confidential   Mat Bicker all requests for admission clinical reviews, approved or denied determinations and any other requests to dedicated fax number below belonging to the campus where the patient is receiving treatment   List of dedicated fax numbers for the Facilities:  [address] DENIALS (Administrative/Medical Necessity) [phone]   [address] (Maternity/NICU/Pediatrics) [phone] [address] [facility] [address] Anabela Walters [address] ([address]) [address] [place] [address] [address] [place]) [address] [place], [place] [address] [phone]

## 2021-03-26 NOTE — ASSESSMENT & PLAN NOTE
· With mild exacerbation due to COVID-19 infection  · Continue Symbicort and albuterol t i d /q i d    · Respiratory protocol   · Please see COVID treatment outline above

## 2021-03-27 PROCEDURE — 94664 DEMO&/EVAL PT USE INHALER: CPT

## 2021-03-27 PROCEDURE — 99232 SBSQ HOSP IP/OBS MODERATE 35: CPT | Performed by: NURSE PRACTITIONER

## 2021-03-27 PROCEDURE — 94761 N-INVAS EAR/PLS OXIMETRY MLT: CPT

## 2021-03-27 PROCEDURE — 94760 N-INVAS EAR/PLS OXIMETRY 1: CPT

## 2021-03-27 RX ADMIN — TAMSULOSIN HYDROCHLORIDE 0.4 MG: 0.4 CAPSULE ORAL at 09:05

## 2021-03-27 RX ADMIN — APIXABAN 5 MG: 5 TABLET, FILM COATED ORAL at 09:05

## 2021-03-27 RX ADMIN — GABAPENTIN 300 MG: 300 CAPSULE ORAL at 21:27

## 2021-03-27 RX ADMIN — TAMSULOSIN HYDROCHLORIDE 0.4 MG: 0.4 CAPSULE ORAL at 17:03

## 2021-03-27 RX ADMIN — OXYCODONE HYDROCHLORIDE AND ACETAMINOPHEN 1000 MG: 500 TABLET ORAL at 21:27

## 2021-03-27 RX ADMIN — ZINC SULFATE 220 MG (50 MG) CAPSULE 220 MG: CAPSULE at 09:05

## 2021-03-27 RX ADMIN — OXYCODONE HYDROCHLORIDE AND ACETAMINOPHEN 1000 MG: 500 TABLET ORAL at 09:05

## 2021-03-27 RX ADMIN — APIXABAN 5 MG: 5 TABLET, FILM COATED ORAL at 17:03

## 2021-03-27 RX ADMIN — LISINOPRIL 40 MG: 20 TABLET ORAL at 09:05

## 2021-03-27 RX ADMIN — FAMOTIDINE 20 MG: 20 TABLET ORAL at 09:05

## 2021-03-27 RX ADMIN — FAMOTIDINE 20 MG: 20 TABLET ORAL at 17:03

## 2021-03-27 RX ADMIN — FINASTERIDE 5 MG: 5 TABLET, FILM COATED ORAL at 09:05

## 2021-03-27 RX ADMIN — OMEGA-3 FATTY ACIDS CAP 1000 MG 1000 MG: 1000 CAP at 17:03

## 2021-03-27 RX ADMIN — DEXAMETHASONE SODIUM PHOSPHATE 6 MG: 4 INJECTION, SOLUTION INTRA-ARTICULAR; INTRALESIONAL; INTRAMUSCULAR; INTRAVENOUS; SOFT TISSUE at 05:56

## 2021-03-27 RX ADMIN — BUDESONIDE AND FORMOTEROL FUMARATE DIHYDRATE 2 PUFF: 160; 4.5 AEROSOL RESPIRATORY (INHALATION) at 09:06

## 2021-03-27 RX ADMIN — BUDESONIDE AND FORMOTEROL FUMARATE DIHYDRATE 2 PUFF: 160; 4.5 AEROSOL RESPIRATORY (INHALATION) at 17:03

## 2021-03-27 RX ADMIN — Medication 2000 UNITS: at 09:05

## 2021-03-27 RX ADMIN — OMEGA-3 FATTY ACIDS CAP 1000 MG 1000 MG: 1000 CAP at 09:05

## 2021-03-27 NOTE — RESPIRATORY THERAPY NOTE
Home Oxygen Qualifying Test       Patient name: Jadiel Brock        : 1960   Date of Test:  2021  Diagnosis: Sepsis due to COVID-19     Home Oxygen Test:    Medicare Guidelines require item(s) 1-5 on all ambulatory patients or 1 and 2 on non-ambulatory patients  1   Baseline SPO2 on Room Air at rest - 92 %  2   SPO2 during exercise on Room Air - 97%  During exercise monitor SpO2  If SPO2 increases >=89% with ambulation do not add supplemental oxygen  If <= 88% on room air add O2 via NC and titrate patient  Patient must be ambulated with O2 and titrated to > 88% with exertion  3   SPO2 on Oxygen at rest - N/A     4   SPO2 during exercise on Oxygen - N/A     5   Exercise performed:          walking, duration 10 (min), distance 160 (feet)          []  Supplemental Home Oxygen is indicated  [x]  Client does not qualify for home oxygen  Respiratory Additional Notes-     Pt  ambulated back and forth in his room four times  Pt  ambulated on Room Air  Pt  ambulated a slow, steady pace  Pt  stated that he was becoming more tired and his breathing was getting more difficult the longer he ambulated  Pt  had a dry, nonproductive cough while ambulating  Pt  did not complain of chest pain or dizziness  Pt  stated that was the most walking he has done, since he came into the hospital   Pt  was returned to his chair on room air and in satisfactory condition       Vitor Lyons, RT

## 2021-03-27 NOTE — ASSESSMENT & PLAN NOTE
· Noted by fever and tachypnea due to COVID-19 pneumonia  · The patient was admitted under mild pathway  · He was started on dexamethasone day 6/10 and  finished a course remdesivir on 3/26/21  · Continue with COVID vitamins  · Anticoagulation- apixaban  · Trend inflammation markers  · Received 2 days of ceftriaxone and doxycycline; these were stopped as procalcitonin is negative x2  · Discussed convalescent plasma with patient does not want it at this time  Handout given        Lab Results   Component Value Date    SARSCOV2 Positive (A) 03/21/2021    SARSCOV2 Not Detected 04/06/2020     Results from last 7 days   Lab Units 03/26/21  0514 03/25/21  0508 03/24/21  0441 03/23/21  0456 03/22/21  0439 03/21/21  2344   D-DIMER QUANTITATIVE ug/ml FEU 0 38 0 39 0 43 0 50* 0 48  --    CRP mg/L 6 1 8 7* 15 7* 23 2*  --  33 0*   FERRITIN ng/mL 330 340 387 475*  --  628*     Results from last 7 days   Lab Units 03/23/21  0456 03/22/21  0439   PROCALCITONIN ng/ml <0 05 <0 05

## 2021-03-27 NOTE — PLAN OF CARE
Problem: Potential for Falls  Goal: Patient will remain free of falls  Description: INTERVENTIONS:  - Assess patient frequently for physical needs  -  Identify cognitive and physical deficits and behaviors that affect risk of falls    -  [place] fall precautions as indicated by assessment   - Educate patient/family on patient safety including physical limitations  - Instruct patient to call for assistance with activity based on assessment  - Modify environment to reduce risk of injury  - Consider OT/PT consult to assist with strengthening/mobility  Outcome: Progressing     Problem: PAIN - ADULT  Goal: Verbalizes/displays adequate comfort level or baseline comfort level  Description: Interventions:  - Encourage patient to monitor pain and request assistance  - Assess pain using appropriate pain scale  - Administer analgesics based on type and severity of pain and evaluate response  - Implement non-pharmacological measures as appropriate and evaluate response  - Consider cultural and social influences on pain and pain management  - Notify physician/advanced practitioner if interventions unsuccessful or patient reports new pain  Outcome: Progressing     Problem: INFECTION - ADULT  Goal: Absence or prevention of progression during hospitalization  Description: INTERVENTIONS:  - Assess and monitor for signs and symptoms of infection  - Monitor lab/diagnostic results  - Monitor all insertion sites, i e  indwelling lines, tubes, and drains  - Monitor endotracheal if appropriate and nasal secretions for changes in amount and color  - [place] appropriate cooling/warming therapies per order  - Administer medications as ordered  - Instruct and encourage patient and family to use good hand hygiene technique  - Identify and instruct in appropriate isolation precautions for identified infection/condition  Outcome: Progressing  Goal: Absence of fever/infection during neutropenic period  Description: INTERVENTIONS:  - Monitor WBC    Outcome: Progressing     Problem: SAFETY ADULT  Goal: Maintain or return to baseline ADL function  Description: INTERVENTIONS:  -  Assess patient's ability to carry out ADLs; assess patient's baseline for ADL function and identify physical deficits which impact ability to perform ADLs (bathing, care of mouth/teeth, toileting, grooming, dressing, etc )  - Assess/evaluate cause of self-care deficits   - Assess range of motion  - Assess patient's mobility; develop plan if impaired  - Assess patient's need for assistive devices and provide as appropriate  - Encourage maximum independence but intervene and supervise when necessary  - Involve family in performance of ADLs  - Assess for home care needs following discharge   - Consider OT consult to assist with ADL evaluation and planning for discharge  - Provide patient education as appropriate  Outcome: Progressing  Goal: Maintain or return mobility status to optimal level  Description: INTERVENTIONS:  - Assess patient's baseline mobility status (ambulation, transfers, stairs, etc )    - Identify cognitive and physical deficits and behaviors that affect mobility  - Identify mobility aids required to assist with transfers and/or ambulation (gait belt, sit-to-stand, lift, walker, cane, etc )  - [place] fall precautions as indicated by assessment  - Record patient progress and toleration of activity level on Mobility SBAR; progress patient to next Phase/Stage  - Instruct patient to call for assistance with activity based on assessment  - Consider rehabilitation consult to assist with strengthening/weightbearing, etc   Outcome: Progressing     Problem: DISCHARGE PLANNING  Goal: Discharge to home or other facility with appropriate resources  Description: INTERVENTIONS:  - Identify barriers to discharge w/patient and caregiver  - Arrange for needed discharge resources and transportation as appropriate  - Identify discharge learning needs (meds, wound care, etc )  - Arrange for interpretive services to assist at discharge as needed  - Refer to Case Management Department for coordinating discharge planning if the patient needs post-hospital services based on physician/advanced practitioner order or complex needs related to functional status, cognitive ability, or social support system  Outcome: Progressing     Problem: Knowledge Deficit  Goal: Patient/family/caregiver demonstrates understanding of disease process, treatment plan, medications, and discharge instructions  Description: Complete learning assessment and assess knowledge base  Interventions:  - Provide teaching at level of understanding  - Provide teaching via preferred learning methods  Outcome: Progressing     Problem: Nutrition/Hydration-ADULT  Goal: Nutrient/Hydration intake appropriate for improving, restoring or maintaining nutritional needs  Description: Monitor and assess patient's nutrition/hydration status for malnutrition  Collaborate with interdisciplinary team and initiate plan and interventions as ordered  Monitor patient's weight and dietary intake as ordered or per policy  Utilize nutrition screening tool and intervene as necessary  Determine patient's food preferences and provide high-protein, high-caloric foods as appropriate       INTERVENTIONS:  - Monitor oral intake, urinary output, labs, and treatment plans  - Assess nutrition and hydration status and recommend course of action  - Evaluate amount of meals eaten  - Assist patient with eating if necessary   - Allow adequate time for meals  - Recommend/ encourage appropriate diets, oral nutritional supplements, and vitamin/mineral supplements  - Order, calculate, and assess calorie counts as needed  - Recommend, monitor, and adjust tube feedings and TPN/PPN based on assessed needs  - Assess need for intravenous fluids  - Provide specific nutrition/hydration education as appropriate  - Include patient/family/caregiver in decisions related to nutrition  Outcome: Progressing

## 2021-03-27 NOTE — ASSESSMENT & PLAN NOTE
· Due to COVID-19 pneumonia  · Continue treatment outlined above  · Respiratory protocol  · Oxygen supplement to maintain SpO2 greater than 90-92%  · Titrate off oxygen as able; 4L -> 2L    Continues titrate off O2 as tolerated  · Obtain desaturation study   · Encourage cough and deep breathing with incentive spirometry and early ambulation

## 2021-03-27 NOTE — PROGRESS NOTES
[facility]  Progress Note - Rosemarie Alcala 1960, 61 y o  male MRN: [id number]  Unit/Bed#: -02 Encounter: [id number]  Primary Care Provider: Mando Israel MD   Date and time admitted to hospital: 3/21/2021 10:56 PM    * Sepsis due to COVID-19 [facility])  Assessment & Plan  · Noted by fever and tachypnea due to COVID-19 pneumonia  · The patient was admitted under mild pathway  · He was started on dexamethasone day 6/10 and  finished a course remdesivir on 3/26/21  · Continue with COVID vitamins  · Anticoagulation- apixaban  · Trend inflammation markers  · Received 2 days of ceftriaxone and doxycycline; these were stopped as procalcitonin is negative x2  · Discussed convalescent plasma with patient does not want it at this time  Handout given  Lab Results   Component Value Date    SARSCOV2 Positive (A) 03/21/2021    SARSCOV2 Not Detected 04/06/2020     Results from last 7 days   Lab Units 03/26/21  0514 03/25/21  0508 03/24/21  0441 03/23/21  0456 03/22/21  0439 03/21/21  2344   D-DIMER QUANTITATIVE ug/ml FEU 0 38 0 39 0 43 0 50* 0 48  --    CRP mg/L 6 1 8 7* 15 7* 23 2*  --  33 0*   FERRITIN ng/mL 330 340 387 475*  --  628*     Results from last 7 days   Lab Units 03/23/21  0456 03/22/21  0439   PROCALCITONIN ng/ml <0 05 <0 05         Acute respiratory failure with hypoxia (HCC)  Assessment & Plan  · Due to COVID-19 pneumonia  · Continue treatment outlined above  · Respiratory protocol  · Oxygen supplement to maintain SpO2 greater than 90-92%  · Titrate off oxygen as able; 4L -> 2L    Continues titrate off O2 as tolerated  · Obtain desaturation study   · Encourage cough and deep breathing with incentive spirometry and early ambulation     History of DVT (deep vein thrombosis)  Assessment & Plan  · And history of pulmonary embolism   · Continue Eliquis 5 mg b i d        Moderate asthma without complication  Assessment & Plan  · With mild exacerbation due to COVID-19 infection  · Continue Symbicort and albuterol t i d /q i d  · Respiratory protocol   · Please see COVID treatment outline above      Essential hypertension  Assessment & Plan  · Continue lisinopril         Benign prostatic hyperplasia with lower urinary tract symptoms  Assessment & Plan  · Continue Proscar and Flomax              VTE Prophylaxis:  Apixaban (Eliquis)    Patient Centered Rounds: I have performed bedside rounds with nursing staff today  Discussions with Specialists or Other Care Team Provider: nursing, cm, RT  Education and Discussions with Family / Patient: patient     Current Length of Stay: 5 day(s)    Current Patient Status: Inpatient   Certification Statement: The patient will continue to require additional inpatient hospital stay due to [place]     Discharge Plan: pending hospital course  Hopeful in 24 hours  Code Status: Level 1 - Full Code    Subjective:   Feeling better  Continues to have some dyspnea with exertion but much improved since admission  Continues to have some dry cough but again improved  Objective:     Vitals:   Temp (24hrs), Av 1 °F (36 2 °C), Min:96 9 °F (36 1 °C), Max:97 2 °F (36 2 °C)    Temp:  [96 9 °F (36 1 °C)-97 2 °F (36 2 °C)] 97 1 °F (36 2 °C)  HR:  [] 59  Resp:  [16-22] 16  BP: (118-147)/(62-84) 122/62  SpO2:  [90 %-94 %] 94 %  Body mass index is 36 24 kg/m²  Input and Output Summary (last 24 hours): Intake/Output Summary (Last 24 hours) at 3/27/2021 1122  Last data filed at 3/26/2021 1700  Gross per 24 hour   Intake 240 ml   Output --   Net 240 ml       Physical Exam:   Physical Exam  Vitals signs and nursing note reviewed  Constitutional:       General: He is not in acute distress  Appearance: Normal appearance  HENT:      Head: Normocephalic and atraumatic        Right Ear: External ear normal       Left Ear: External ear normal       Nose: Nose normal       Mouth/Throat:      Mouth: Mucous membranes are moist       Pharynx: Oropharynx is clear  Eyes:      General:         Right eye: No discharge  Left eye: No discharge  Extraocular Movements: Extraocular movements intact  Pupils: Pupils are equal, round, and reactive to light  Neck:      Musculoskeletal: Normal range of motion and neck supple  No neck rigidity  Cardiovascular:      Rate and Rhythm: Normal rate and regular rhythm  Pulses: Normal pulses  Heart sounds: Normal heart sounds  No murmur  Pulmonary:      Effort: Pulmonary effort is normal  No respiratory distress  Breath sounds: No wheezing or rales  Comments: Decreased in bases    Abdominal:      General: Bowel sounds are normal  There is no distension  Palpations: Abdomen is soft  There is no mass  Tenderness: There is no abdominal tenderness  Musculoskeletal: Normal range of motion  General: No swelling, tenderness or deformity  Skin:     General: Skin is warm and dry  Capillary Refill: Capillary refill takes less than 2 seconds  Coloration: Skin is not pale  Findings: No erythema  Neurological:      General: No focal deficit present  Mental Status: He is alert and oriented to person, place, and time  Mental status is at baseline  Psychiatric:         Mood and Affect: Mood normal          Behavior: Behavior normal          Thought Content: Thought content normal          Judgment: Judgment normal          Additional Data:     Labs:    Results from last 7 days   Lab Units 03/26/21  0514  03/21/21  2344   WBC Thousand/uL 9 70   < > 5 00   HEMOGLOBIN g/dL 13 3*   < > 14 8   HEMATOCRIT % 38 7*   < > 43 3   PLATELETS Thousands/uL 241   < > 152   NEUTROS PCT %  --   --  74   LYMPHS PCT %  --   --  15*   MONOS PCT %  --   --  11   EOS PCT %  --   --  0    < > = values in this interval not displayed       Results from last 7 days   Lab Units 03/26/21  0514   SODIUM mmol/L 139   POTASSIUM mmol/L 3 9   CHLORIDE mmol/L 105   CO2 mmol/L 24   BUN mg/dL 23   CREATININE mg/dL 0 72   CALCIUM mg/dL 8 5*   ALK PHOS U/L 27*   ALT U/L 47   AST U/L 32                   * I Have Reviewed All Lab Data Listed Above  * Additional Pertinent Lab Tests Reviewed: Rebeca 66 Admission  Reviewed    Imaging:  Imaging Reports Reviewed Today Include: n/a     Recent Cultures (last 7 days):           Last 24 Hours Medication List:   Current Facility-Administered Medications   Medication Dose Route Frequency Provider Last Rate    acetaminophen  650 mg Oral Q4H PRN Yaima Kaminski PA-C      albuterol  2 puff Inhalation Q6H PRN Maryjo Martínez MD      apixaban  5 mg Oral BID KRYSTLE Rouse-WILLIAM      ascorbic acid  1,000 mg Oral Q12H [address] Yaima Kaminski PA-C      budesonide-formoterol  2 puff Inhalation BID KRYSTLE Rouse-WILLIAM      cholecalciferol  2,000 Units Oral Daily Yaima Kaminski PA-C      dexamethasone  6 mg Intravenous Q24H Yaima Kaminski PA-C      famotidine  20 mg Oral BID KRYSTLE Rouse-WILLIAM      finasteride  5 mg Oral Daily KRYSTLE Rouse-WILLIAM      fish oil  1,000 mg Oral BID Yaima Kaminski PA-C      gabapentin  300 mg Oral HS Yaima Kaminski PA-C      lisinopril  40 mg Oral Daily KRYSTLE Rouse-WILLIAM      zinc sulfate  220 mg Oral Daily Yaima Kaminski PA-C      Followed by   [facility] ON 3/29/2021] multivitamin-minerals  1 tablet Oral Daily Yaima Kaminski PA-C      ondansetron  4 mg Intravenous Q6H PRN Yaima Kaminski PA-C      tamsulosin  0 4 mg Oral BID Yaima Kaminski PA-C          Today, Patient Was Seen By: LIS Beltran    ** Please Note: Dictation voice to text software may have been used in the creation of this document   **

## 2021-03-28 VITALS
SYSTOLIC BLOOD PRESSURE: 119 MMHG | HEIGHT: 72 IN | WEIGHT: 267.2 LBS | DIASTOLIC BLOOD PRESSURE: 72 MMHG | RESPIRATION RATE: 16 BRPM | OXYGEN SATURATION: 92 % | BODY MASS INDEX: 36.19 KG/M2 | TEMPERATURE: 97.2 F | HEART RATE: 56 BPM

## 2021-03-28 PROBLEM — J96.01 ACUTE RESPIRATORY FAILURE WITH HYPOXIA (HCC): Status: RESOLVED | Noted: 2021-03-23 | Resolved: 2021-03-28

## 2021-03-28 LAB
ALBUMIN SERPL BCP-MCNC: 3.4 G/DL (ref 3.5–5.7)
ALP SERPL-CCNC: 32 U/L (ref 55–165)
ALT SERPL W P-5'-P-CCNC: 49 U/L (ref 7–52)
ANION GAP SERPL CALCULATED.3IONS-SCNC: 10 MMOL/L (ref 4–13)
AST SERPL W P-5'-P-CCNC: 28 U/L (ref 13–39)
BILIRUB SERPL-MCNC: 0.5 MG/DL (ref 0.2–1)
BUN SERPL-MCNC: 23 MG/DL (ref 7–25)
CALCIUM ALBUM COR SERPL-MCNC: 9.5 MG/DL (ref 8.3–10.1)
CALCIUM SERPL-MCNC: 9 MG/DL (ref 8.6–10.5)
CHLORIDE SERPL-SCNC: 101 MMOL/L (ref 98–107)
CO2 SERPL-SCNC: 25 MMOL/L (ref 21–31)
CREAT SERPL-MCNC: 0.82 MG/DL (ref 0.7–1.3)
ERYTHROCYTE [DISTWIDTH] IN BLOOD BY AUTOMATED COUNT: 13.8 % (ref 11.5–14.5)
GFR SERPL CREATININE-BSD FRML MDRD: 96 ML/MIN/1.73SQ M
GLUCOSE SERPL-MCNC: 157 MG/DL (ref 65–99)
HCT VFR BLD AUTO: 40.8 % (ref 42–47)
HGB BLD-MCNC: 13.8 G/DL (ref 14–18)
MCH RBC QN AUTO: 31.1 PG (ref 26–34)
MCHC RBC AUTO-ENTMCNC: 33.9 G/DL (ref 31–37)
MCV RBC AUTO: 92 FL (ref 81–99)
PLATELET # BLD AUTO: 316 THOUSANDS/UL (ref 149–390)
PMV BLD AUTO: 8.1 FL (ref 8.6–11.7)
POTASSIUM SERPL-SCNC: 4.1 MMOL/L (ref 3.5–5.5)
PROT SERPL-MCNC: 6.5 G/DL (ref 6.4–8.9)
RBC # BLD AUTO: 4.44 MILLION/UL (ref 4.3–5.9)
SODIUM SERPL-SCNC: 136 MMOL/L (ref 134–143)
WBC # BLD AUTO: 12.6 THOUSAND/UL (ref 4.8–10.8)

## 2021-03-28 PROCEDURE — 85027 COMPLETE CBC AUTOMATED: CPT | Performed by: NURSE PRACTITIONER

## 2021-03-28 PROCEDURE — 99239 HOSP IP/OBS DSCHRG MGMT >30: CPT | Performed by: NURSE PRACTITIONER

## 2021-03-28 PROCEDURE — 80053 COMPREHEN METABOLIC PANEL: CPT | Performed by: NURSE PRACTITIONER

## 2021-03-28 RX ORDER — PREDNISONE 10 MG/1
20 TABLET ORAL DAILY
Qty: 6 TABLET | Refills: 0 | Status: SHIPPED | OUTPATIENT
Start: 2021-03-28

## 2021-03-28 RX ADMIN — ZINC SULFATE 220 MG (50 MG) CAPSULE 220 MG: CAPSULE at 08:13

## 2021-03-28 RX ADMIN — APIXABAN 5 MG: 5 TABLET, FILM COATED ORAL at 08:13

## 2021-03-28 RX ADMIN — BUDESONIDE AND FORMOTEROL FUMARATE DIHYDRATE 2 PUFF: 160; 4.5 AEROSOL RESPIRATORY (INHALATION) at 08:14

## 2021-03-28 RX ADMIN — FINASTERIDE 5 MG: 5 TABLET, FILM COATED ORAL at 08:13

## 2021-03-28 RX ADMIN — Medication 2000 UNITS: at 08:13

## 2021-03-28 RX ADMIN — DEXAMETHASONE SODIUM PHOSPHATE 6 MG: 4 INJECTION, SOLUTION INTRA-ARTICULAR; INTRALESIONAL; INTRAMUSCULAR; INTRAVENOUS; SOFT TISSUE at 05:22

## 2021-03-28 RX ADMIN — TAMSULOSIN HYDROCHLORIDE 0.4 MG: 0.4 CAPSULE ORAL at 08:13

## 2021-03-28 RX ADMIN — OMEGA-3 FATTY ACIDS CAP 1000 MG 1000 MG: 1000 CAP at 08:13

## 2021-03-28 RX ADMIN — FAMOTIDINE 20 MG: 20 TABLET ORAL at 08:13

## 2021-03-28 RX ADMIN — LISINOPRIL 40 MG: 20 TABLET ORAL at 08:13

## 2021-03-28 RX ADMIN — OXYCODONE HYDROCHLORIDE AND ACETAMINOPHEN 1000 MG: 500 TABLET ORAL at 08:13

## 2021-03-28 NOTE — PLAN OF CARE
Problem: Potential for Falls  Goal: Patient will remain free of falls  Description: INTERVENTIONS:  - Assess patient frequently for physical needs  -  Identify cognitive and physical deficits and behaviors that affect risk of falls    -  [place] fall precautions as indicated by assessment   - Educate patient/family on patient safety including physical limitations  - Instruct patient to call for assistance with activity based on assessment  - Modify environment to reduce risk of injury  - Consider OT/PT consult to assist with strengthening/mobility  Outcome: Progressing     Problem: PAIN - ADULT  Goal: Verbalizes/displays adequate comfort level or baseline comfort level  Description: Interventions:  - Encourage patient to monitor pain and request assistance  - Assess pain using appropriate pain scale  - Administer analgesics based on type and severity of pain and evaluate response  - Implement non-pharmacological measures as appropriate and evaluate response  - Consider cultural and social influences on pain and pain management  - Notify physician/advanced practitioner if interventions unsuccessful or patient reports new pain  Outcome: Progressing     Problem: INFECTION - ADULT  Goal: Absence or prevention of progression during hospitalization  Description: INTERVENTIONS:  - Assess and monitor for signs and symptoms of infection  - Monitor lab/diagnostic results  - Monitor all insertion sites, i e  indwelling lines, tubes, and drains  - Monitor endotracheal if appropriate and nasal secretions for changes in amount and color  - [place] appropriate cooling/warming therapies per order  - Administer medications as ordered  - Instruct and encourage patient and family to use good hand hygiene technique  - Identify and instruct in appropriate isolation precautions for identified infection/condition  Outcome: Progressing  Goal: Absence of fever/infection during neutropenic period  Description: INTERVENTIONS:  - Monitor WBC    Outcome: Progressing     Problem: SAFETY ADULT  Goal: Maintain or return to baseline ADL function  Description: INTERVENTIONS:  -  Assess patient's ability to carry out ADLs; assess patient's baseline for ADL function and identify physical deficits which impact ability to perform ADLs (bathing, care of mouth/teeth, toileting, grooming, dressing, etc )  - Assess/evaluate cause of self-care deficits   - Assess range of motion  - Assess patient's mobility; develop plan if impaired  - Assess patient's need for assistive devices and provide as appropriate  - Encourage maximum independence but intervene and supervise when necessary  - Involve family in performance of ADLs  - Assess for home care needs following discharge   - Consider OT consult to assist with ADL evaluation and planning for discharge  - Provide patient education as appropriate  Outcome: Progressing  Goal: Maintain or return mobility status to optimal level  Description: INTERVENTIONS:  - Assess patient's baseline mobility status (ambulation, transfers, stairs, etc )    - Identify cognitive and physical deficits and behaviors that affect mobility  - Identify mobility aids required to assist with transfers and/or ambulation (gait belt, sit-to-stand, lift, walker, cane, etc )  - [place] fall precautions as indicated by assessment  - Record patient progress and toleration of activity level on Mobility SBAR; progress patient to next Phase/Stage  - Instruct patient to call for assistance with activity based on assessment  - Consider rehabilitation consult to assist with strengthening/weightbearing, etc   Outcome: Progressing     Problem: DISCHARGE PLANNING  Goal: Discharge to home or other facility with appropriate resources  Description: INTERVENTIONS:  - Identify barriers to discharge w/patient and caregiver  - Arrange for needed discharge resources and transportation as appropriate  - Identify discharge learning needs (meds, wound care, etc )  - Arrange for interpretive services to assist at discharge as needed  - Refer to Case Management Department for coordinating discharge planning if the patient needs post-hospital services based on physician/advanced practitioner order or complex needs related to functional status, cognitive ability, or social support system  Outcome: Progressing     Problem: Knowledge Deficit  Goal: Patient/family/caregiver demonstrates understanding of disease process, treatment plan, medications, and discharge instructions  Description: Complete learning assessment and assess knowledge base  Interventions:  - Provide teaching at level of understanding  - Provide teaching via preferred learning methods  Outcome: Progressing     Problem: Nutrition/Hydration-ADULT  Goal: Nutrient/Hydration intake appropriate for improving, restoring or maintaining nutritional needs  Description: Monitor and assess patient's nutrition/hydration status for malnutrition  Collaborate with interdisciplinary team and initiate plan and interventions as ordered  Monitor patient's weight and dietary intake as ordered or per policy  Utilize nutrition screening tool and intervene as necessary  Determine patient's food preferences and provide high-protein, high-caloric foods as appropriate       INTERVENTIONS:  - Monitor oral intake, urinary output, labs, and treatment plans  - Assess nutrition and hydration status and recommend course of action  - Evaluate amount of meals eaten  - Assist patient with eating if necessary   - Allow adequate time for meals  - Recommend/ encourage appropriate diets, oral nutritional supplements, and vitamin/mineral supplements  - Order, calculate, and assess calorie counts as needed  - Recommend, monitor, and adjust tube feedings and TPN/PPN based on assessed needs  - Assess need for intravenous fluids  - Provide specific nutrition/hydration education as appropriate  - Include patient/family/caregiver in decisions related to nutrition  Outcome: Progressing

## 2021-03-28 NOTE — NURSING NOTE
Pt discharged home  Denies pain, denies shortness of breath, AF, VSS  Discharge instructions read and explained to pt, all questions answered  IV removed without complications and tip intact  All belongings with pt, Pt escorted to front entrance and assisted into car of wife

## 2021-03-28 NOTE — DISCHARGE INSTR - AVS FIRST PAGE
Dear Tyler Juarez,     It was our pleasure to care for you here at [facility]  It is our hope that we were always able to exceed the expected standards for your care during your stay  You were hospitalized due to COVID 19 pneumonia  You were cared for on the 1st floor by LIS Markham with the [facility] who covers for your primary care physician (PCP), Gabby Yuen MD, while you were hospitalized  If you have any questions or concerns related to this hospitalization, you may contact us at [phone]  For follow up as well as any medication refills, we recommend that you follow up with your primary care physician  A registered nurse will reach out to you by phone within a few days after your discharge to answer any additional questions that you may have after going home  However, at this time we provide for you here, the most important instructions / recommendations at discharge:     · Notable Medication Adjustments -   · Prednisone taper course  · Testing Required after Discharge -   · None  · Important follow up information -   · Follow-up PCP  · Other Instructions -   · Remain quarantine through 4/8/2021  · Please follow-up with PCP regarding COVID vaccine; at this time recommends to obtain COVID vaccine 90 days after the 1st onset of symptoms  · Please refer to discharge instruction  · Please review this entire after visit summary as additional general instructions including medication list, appointments, activity, diet, any pertinent wound care, and other additional recommendations from your care team that may be provided for you        Sincerely,     LIS Markham

## 2021-03-28 NOTE — DISCHARGE SUMMARY
[address]  Discharge- Padmini Rough 1960, 61 y o  male MRN: [id number]  Unit/Bed#: -02 Encounter: [id number]  Primary Care Provider: Juan Daniel Ash MD   Date and time admitted to hospital: 3/21/2021 10:56 PM    * Sepsis due to COVID-19 [facility])  Assessment & Plan  · Noted by fever and tachypnea due to COVID-19 pneumonia  · The patient was admitted under mild pathway  · Received 7 day of dexamethasone and  finished a course remdesivir on 3/26/21  · Continue with short taper course of prednisone on discharge  · Continue with COVID vitamins  · Anticoagulation- continue on apixaban on discharge (prior h/o DVT)   · Trend inflammation markers  · Received 2 days of ceftriaxone and doxycycline; these were stopped as procalcitonin is negative x2  · The patient did not receive convalescent plasma- patient declined option    Lab Results   Component Value Date    SARSCOV2 Positive (A) 03/21/2021    SARSCOV2 Not Detected 04/06/2020     Results from last 7 days   Lab Units 03/26/21  0514 03/25/21  0508 03/24/21  0441 03/23/21  0456 03/22/21  0439 03/21/21  2344   D-DIMER QUANTITATIVE ug/ml FEU 0 38 0 39 0 43 0 50* 0 48  --    CRP mg/L 6 1 8 7* 15 7* 23 2*  --  33 0*   FERRITIN ng/mL 330 340 387 475*  --  628*     Results from last 7 days   Lab Units 03/23/21  0456 03/22/21  0439   PROCALCITONIN ng/ml <0 05 <0 05         Acute respiratory failure with hypoxia (HCC)  Assessment & Plan  · Due to COVID-19 pneumonia  · Acute respiratory failure is resolved the patient was titrated off of O2  · The sisters study was done and the patient is not qualified for home O2    History of DVT (deep vein thrombosis)  Assessment & Plan  · And history of pulmonary embolism   · Continue Eliquis 5 mg b i d         Moderate asthma without complication  Assessment & Plan  · With mild exacerbation due to COVID-19 infection- much improved   · Continue Symbicort and albuterol t i d /q i d    · Please see COVID treatment outline above      Essential hypertension  Assessment & Plan  · Continue lisinopril          Benign prostatic hyperplasia with lower urinary tract symptoms  Assessment & Plan  · Continue Proscar and Flomax               Discharging Physician / Practitioner: LIS Petersen  PCP: Bulmaro Taylor MD  Admission Date:   Admission Orders (From admission, onward)     Ordered        03/22/21 1556  Inpatient Admission  Once         03/22/21 0113  Place in Observation  Once                   Discharge Date: 03/28/21    Resolved Problems  Date Reviewed: 3/28/2021          Resolved    Hyponatremia 3/26/2021     Resolved by  Mila Bardales, [address] [place] [address] Stay:  · None     Procedures Performed:   · None     Significant Findings / Test Results:   · Chest x-ray Patchy airspace opacities bilaterally consistent with the patient's clinical history    Incidental Findings:   · None      Test Results Pending at Discharge (will require follow up):   · none     Outpatient Tests Requested:  · Follow up with PCP     Complications:     None     Reason for Admission:  Weakness    Hospital Course: Hilda Panchal is a 61 y o  male patient who originally presented to the hospital on 3/21/2021 due to generalized weakness  Please refer to H&P for initial presenting complaint  Brief the patient presented with increasing generalized weakness with cough and diarrhea  He was diagnosed with COVID-19 approximately 5 days prior to admission  He reports the 1st onset of symptom was March 18  The patient was admitted on mild pathway require oxygen supplement  He was started on dexamethasone  The patient finished a course of remdesivir on 03/26/2021  An option for convalescent plasma with discussed with the patient however he declined the option  On discharge the patient will be on prednisone taper course for 4 more days  He has a history of DVT and has been on Eliquis prior    He was evaluated by respiratory therapist and had a desaturates done and is not qualify for O2  Overall clinically is much improved and the patient is medically cleared to be discharged home today  Please see above list of diagnoses and related plan for additional information  Condition at Discharge: good     Discharge Day Visit / Exam:     Subjective:  Feeling much improved  Would like to go home  Vitals: Blood Pressure: 119/72 (03/28/21 0715)  Pulse: 56 (03/28/21 0715)  Temperature: (!) 97 2 °F (36 2 °C) (03/28/21 0715)  Temp Source: Temporal (03/28/21 0715)  Respirations: 16 (03/28/21 0715)  Height: 6' (182 9 cm) (03/22/21 0310)  Weight - Scale: 121 kg (267 lb 3 2 oz) (03/22/21 0310)  SpO2: 92 % (03/28/21 0715)  Exam:   Physical Exam  Vitals signs and nursing note reviewed  Constitutional:       General: He is not in acute distress  Appearance: Normal appearance  HENT:      Head: Normocephalic and atraumatic  Right Ear: External ear normal       Left Ear: External ear normal       Nose: Nose normal       Mouth/Throat:      Mouth: Mucous membranes are moist       Pharynx: Oropharynx is clear  Eyes:      General:         Right eye: No discharge  Left eye: No discharge  Extraocular Movements: Extraocular movements intact  Pupils: Pupils are equal, round, and reactive to light  Neck:      Musculoskeletal: Normal range of motion and neck supple  No neck rigidity  Cardiovascular:      Rate and Rhythm: Normal rate and regular rhythm  Pulses: Normal pulses  Heart sounds: Normal heart sounds  No murmur  Pulmonary:      Effort: Pulmonary effort is normal  No respiratory distress  Breath sounds: Normal breath sounds  No wheezing or rales  Abdominal:      General: Bowel sounds are normal  There is no distension  Palpations: Abdomen is soft  There is no mass  Tenderness: There is no abdominal tenderness  Musculoskeletal: Normal range of motion           General: No swelling, tenderness or deformity  Skin:     General: Skin is warm and dry  Capillary Refill: Capillary refill takes less than 2 seconds  Coloration: Skin is not pale  Findings: No erythema  Neurological:      General: No focal deficit present  Mental Status: He is alert and oriented to person, place, and time  Mental status is at baseline  Psychiatric:         Mood and Affect: Mood normal          Behavior: Behavior normal          Thought Content: Thought content normal          Judgment: Judgment normal          Discussion with Family:  None present during exam    Discharge instructions/Information to patient and family:   See after visit summary for information provided to patient and family  Provisions for Follow-Up Care:  See after visit summary for information related to follow-up care and any pertinent home health orders  Disposition:     Home      Planned Readmission:    No     Discharge Statement:  I spent 37 minutes discharging the patient  This time was spent on the day of discharge  I had direct contact with the patient on the day of discharge  Greater than 50% of the total time was spent examining patient, answering all patient questions, arranging and discussing plan of care with patient as well as directly providing post-discharge instructions  Additional time then spent on discharge activities  Discharge Medications:  See after visit summary for reconciled discharge medications provided to patient and family        ** Please Note: This note has been constructed using a voice recognition system **

## 2021-03-28 NOTE — ASSESSMENT & PLAN NOTE
· Due to COVID-19 pneumonia  · Acute respiratory failure is resolved the patient was titrated off of O2  · The sisters study was done and the patient is not qualified for home O2

## 2021-03-28 NOTE — ASSESSMENT & PLAN NOTE
· With mild exacerbation due to COVID-19 infection- much improved   · Continue Symbicort and albuterol t i d /q i d    · Please see COVID treatment outline above

## 2021-03-28 NOTE — ASSESSMENT & PLAN NOTE
· Noted by fever and tachypnea due to COVID-19 pneumonia  · The patient was admitted under mild pathway  · Received 7 day of dexamethasone and  finished a course remdesivir on 3/26/21  · Continue with short taper course of prednisone on discharge  · Continue with COVID vitamins  · Anticoagulation- continue on apixaban on discharge (prior h/o DVT)   · Trend inflammation markers  · Received 2 days of ceftriaxone and doxycycline; these were stopped as procalcitonin is negative x2  · The patient did not receive convalescent plasma- patient declined option    Lab Results   Component Value Date    SARSCOV2 Positive (A) 03/21/2021    SARSCOV2 Not Detected 04/06/2020     Results from last 7 days   Lab Units 03/26/21  0514 03/25/21  0508 03/24/21  0441 03/23/21  0456 03/22/21  0439 03/21/21  2344   D-DIMER QUANTITATIVE ug/ml FEU 0 38 0 39 0 43 0 50* 0 48  --    CRP mg/L 6 1 8 7* 15 7* 23 2*  --  33 0*   FERRITIN ng/mL 330 340 387 475*  --  628*     Results from last 7 days   Lab Units 03/23/21  0456 03/22/21  0439   PROCALCITONIN ng/ml <0 05 <0 05

## 2021-04-06 ENCOUNTER — TELEPHONE (OUTPATIENT)
Dept: UROLOGY | Facility: MEDICAL CENTER | Age: 61
End: 2021-04-06

## 2021-04-06 NOTE — TELEPHONE ENCOUNTER
Pt managed by [facility], pt recently discharged hospital 3/28 will have completed covid quarantine 4/8 he is questioning if he should keep 4/9 appointment or reschedule ?

## 2021-04-08 NOTE — TELEPHONE ENCOUNTER
Lvm for pt to CB to confirm TENTATIVE appt with Dr Rubi Mac at [address] [facility] location on 4/30/21 at 3:45pm

## 2021-04-15 LAB
PSA FREE MFR SERPL: ABNORMAL % (CALC)
PSA FREE SERPL-MCNC: 3 NG/ML
PSA SERPL-MCNC: 22.1 NG/ML

## 2021-04-16 ENCOUNTER — TELEPHONE (OUTPATIENT)
Dept: UROLOGY | Facility: CLINIC | Age: 61
End: 2021-04-16

## 2021-04-16 NOTE — TELEPHONE ENCOUNTER
Called and left Vm for pt to call back and speak with clinical     Office number left      ----- Message from Rosalinda Villanueva MD sent at 4/16/2021  8:36 AM EDT -----  Can we find out if patient is no longer taking his Proscar?

## 2021-04-17 DIAGNOSIS — N40.1 BENIGN PROSTATIC HYPERPLASIA WITH LOWER URINARY TRACT SYMPTOMS, SYMPTOM DETAILS UNSPECIFIED: ICD-10-CM

## 2021-04-17 RX ORDER — TAMSULOSIN HYDROCHLORIDE 0.4 MG/1
CAPSULE ORAL
Qty: 180 CAPSULE | Refills: 1 | Status: SHIPPED | OUTPATIENT
Start: 2021-04-17 | End: 2021-10-18

## 2021-04-19 NOTE — TELEPHONE ENCOUNTER
Pt called said he had covid and ran out of mediation and just has not hoover din for the refill he stated he is out for about a month

## 2021-04-30 ENCOUNTER — OFFICE VISIT (OUTPATIENT)
Dept: UROLOGY | Facility: CLINIC | Age: 61
End: 2021-04-30
Payer: COMMERCIAL

## 2021-04-30 VITALS
DIASTOLIC BLOOD PRESSURE: 92 MMHG | SYSTOLIC BLOOD PRESSURE: 160 MMHG | HEART RATE: 76 BPM | BODY MASS INDEX: 38.06 KG/M2 | HEIGHT: 72 IN | WEIGHT: 281 LBS

## 2021-04-30 DIAGNOSIS — R97.20 ELEVATED PSA: ICD-10-CM

## 2021-04-30 DIAGNOSIS — N40.1 BENIGN PROSTATIC HYPERPLASIA WITH LOWER URINARY TRACT SYMPTOMS, SYMPTOM DETAILS UNSPECIFIED: Primary | ICD-10-CM

## 2021-04-30 LAB
POST-VOID RESIDUAL VOLUME, ML POC: 13 ML
SL AMB  POCT GLUCOSE, UA: NORMAL
SL AMB LEUKOCYTE ESTERASE,UA: NORMAL
SL AMB POCT BILIRUBIN,UA: NORMAL
SL AMB POCT BLOOD,UA: NORMAL
SL AMB POCT CLARITY,UA: CLEAR
SL AMB POCT COLOR,UA: YELLOW
SL AMB POCT KETONES,UA: NORMAL
SL AMB POCT NITRITE,UA: NORMAL
SL AMB POCT PH,UA: 5
SL AMB POCT SPECIFIC GRAVITY,UA: 1
SL AMB POCT URINE PROTEIN: NORMAL
SL AMB POCT UROBILINOGEN: 0.2

## 2021-04-30 PROCEDURE — 81002 URINALYSIS NONAUTO W/O SCOPE: CPT | Performed by: UROLOGY

## 2021-04-30 PROCEDURE — 51798 US URINE CAPACITY MEASURE: CPT | Performed by: UROLOGY

## 2021-04-30 PROCEDURE — 99213 OFFICE O/P EST LOW 20 MIN: CPT | Performed by: UROLOGY

## 2021-04-30 RX ORDER — FINASTERIDE 5 MG/1
5 TABLET, FILM COATED ORAL DAILY
Qty: 90 TABLET | Refills: 3 | Status: SHIPPED | OUTPATIENT
Start: 2021-04-30 | End: 2022-06-27 | Stop reason: SDUPTHER

## 2021-04-30 NOTE — PROGRESS NOTES
UROLOGY ROUTINE FOLLOW UP NOTE     CHIEF COMPLAINT   Fransisca Stallings is a 64 y o  male with a complaint of   Chief Complaint   Patient presents with    Follow-up    Benign Prostatic Hypertrophy       History of Present Illness:     64 y o  male with a history of enlarged prostate and elevated PSA  Previously seen by my partner, Dr Jacqui Ruiz  Last seen in 2011  Patient was on Proscar for lower urinary tract symptoms  He had previously undergone a prostate biopsy in 2009 which was negative for cancer      Patient denies prostate cancer history in his family  His brother has undergone a transrectal resection of prostate for obstructive urinary symptoms  He presented again in 2017 with acute urinary retention  He was transferred from an outside hospital due to difficult placing a Fowler catheter the emergency room  Catheter was able to be placed at [address]  800 mL plus was returned  The patient was started on Flomax      After evaluation for his gross hematuria, patient was restarted on Proscar  Hematuria workup was undertaken  The patient did undergo a cystoscopy and a second transrectal biopsy the prostate in February 2017      He was noted to have a markedly enlarged prostate at 138 g  He did well after the prostate biopsy and had minimal issues or side effects  Lab Results   Component Value Date    PSA 22 1 (H) 04/14/2021    PSA 4 7 (H) 01/15/2020     The patient  had dramatic reduction in his PSA on Proscar  His PSA previously approach 20 and is now 4 7  After 2- biopsies and an MRI that demonstrated a prostate size approaching 140 g, I am pleased to say that we can be comfortable that likely his risk of cancer is extremely low  The patient also feels as though he has improved his urinary function on dual therapy  Patient returns at interval and has not been on his Proscar last 6 months  He ran out of the medication  Unfortunately, we have seen a expected rebound of his PSA    He has noted worsening of his urinary symptoms  Past Medical History:     Past Medical History:   Diagnosis Date    Acute respiratory failure with hypoxia ([address] ) 3/23/2021    Arthritis     Asthma     Enlarged prostate     Hyperlipidemia     Hypertension     Hyponatremia 3/25/2021    Lumbar herniated disc        PAST SURGICAL HISTORY:     Past Surgical History:   Procedure Laterality Date    LUNG BIOPSY      PROSTATE BIOPSY         CURRENT MEDICATIONS:     Current Outpatient Medications   Medication Sig Dispense Refill    albuterol (PROVENTIL HFA,VENTOLIN HFA) 90 mcg/act inhaler Inhale 2 puffs every 6 (six) hours as needed for wheezing      apixaban (ELIQUIS) 5 mg 2 tablets twice a day until June 8, then on June 9 change to 1 tablet twice a day   budesonide-formoterol (SYMBICORT) 160-4 5 mcg/act inhaler Inhale 2 puffs      Cholecalciferol (VITAMIN D3 PO) Take 1 tablet by mouth daily      finasteride (PROSCAR) 5 mg tablet Take 1 tablet (5 mg total) by mouth daily 90 tablet 3    gabapentin (NEURONTIN) 300 mg capsule Take 300 mg by mouth 2 (two) times a day      lisinopril (ZESTRIL) 40 mg tablet Take 40 mg by mouth daily      multivitamin (THERAGRAN) TABS Take 1 tablet by mouth daily      Omega-3 Fatty Acids (FISH OIL) 1200 MG CAPS Take 1 capsule by mouth 2 (two) times a day      ranitidine (ZANTAC) 150 mg tablet Take 150 mg by mouth 2 (two) times a day      tamsulosin (FLOMAX) 0 4 mg take 1 capsule by mouth twice a day 180 capsule 1    predniSONE 10 mg tablet Take 2 tablets (20 mg total) by mouth daily Take 20 mg x 2 days, take 10 mg x 2 days then stop (Patient not taking: Reported on 4/30/2021) 6 tablet 0    Red Yeast Rice Extract (RED YEAST RICE PO) Take by mouth       No current facility-administered medications for this visit          ALLERGIES:     Allergies   Allergen Reactions    Percocet [Oxycodone-Acetaminophen] Itching       SOCIAL HISTORY:     Social History     Socioeconomic History    Marital status: /Civil Union     Spouse name: None    Number of children: None    Years of education: None    Highest education level: None   Occupational History    Occupation: laboror   Social Needs    Financial resource strain: None    Food insecurity     Worry: None     Inability: None    Transportation needs     Medical: None     Non-medical: None   Tobacco Use    Smoking status: Never Smoker    Smokeless tobacco: Never Used   Substance and Sexual Activity    Alcohol use: Never     Frequency: Never    Drug use: Never    Sexual activity: Not Currently   Lifestyle    Physical activity     Days per week: None     Minutes per session: None    Stress: None   Relationships    Social connections     Talks on phone: None     Gets together: None     Attends [employer] service: None     Active member of club or organization: None     Attends meetings of clubs or organizations: None     Relationship status: None    Intimate partner violence     Fear of current or ex partner: None     Emotionally abused: None     Physically abused: None     Forced sexual activity: None   Other Topics Concern    None   Social History Narrative    None       SOCIAL HISTORY:     Family History   Problem Relation Age of Onset    Alzheimer's disease Mother     Kidney disease Father     Diabetes Father     Alzheimer's disease Father        REVIEW OF SYSTEMS:     Review of Systems   Constitutional: Negative  Respiratory: Negative  Cardiovascular: Negative  Genitourinary: Positive for frequency  Negative for difficulty urinating and urgency  Musculoskeletal: Negative  Neurological: Negative  Psychiatric/Behavioral: Negative  PHYSICAL EXAM:     /92 (BP Location: Right arm, Patient Position: Sitting, Cuff Size: Adult)   Pulse 76   Ht 6' (1 829 m)   Wt 127 kg (281 lb)   BMI 38 11 kg/m²     General:  Healthy appearing   But obesemale in no acute distress  They have a normal affect    There is not appear to be any gross neurologic defects or abnormalities  HEENT:  Normocephalic, atraumatic  Neck is supple without any palpable lymphadenopathy  Cardiovascular:  Patient has normal palpable distal radial pulses  There is no significant peripheral edema  No JVD is noted  Respiratory:  Patient has unlabored respirations  There is no audible wheeze or rhonchi  Abdomen:  Abdomen is obese without surgical scars  Abdomen is soft and nontender  There is no tympany  Inguinal and umbilical hernia are not appreciated  :  Patient is circumcised but given his obesity and body habitus, has some balanitis, status a colles are descended, is rectal exam is enlarged but smooth  Musculoskeletal:  Patient does not have significant CVA tenderness in the  flank with palpation or percussion  They full range of motion in all 4 extremities  Strength in all 4 extremities appears congruent  Patient is able to ambulate without assistance or difficulty  Dermatologic:  Patient has no skin abnormalities or rashes  LABS:     CBC:   Lab Results   Component Value Date    WBC 12 60 (H) 2021    HGB 13 8 (L) 2021    HCT 40 8 (L) 2021    MCV 92 2021     2021       BMP:   Lab Results   Component Value Date    CALCIUM 9 0 2021    K 4 1 2021    CO2 25 2021     2021    BUN 23 2021    CREATININE 0 82 2021       PSA from May 17, 2016 demonstrates a total PSA of 16 8   PSA from 18 19 3  Lab Results   Component Value Date    PSA 22 1 (H) 2021    PSA 4 7 (H) 01/15/2020     IMAGIN/4/18  MULTIPARAMETRIC MRI OF THE PROSTATE WITH AND WITHOUT CONTRAST      INDICATION:   R97 20: Elevated prostate specific antigen (PSA)      COMPARISON: None      PSA LEVEL: 19 3 ng/ml  on 2018  Micaela Covarrubias PRIOR BIOPSY DATE:     BIOPSY RESULTS: Negative      TECHNIQUE: The following pulse sequences were obtained on a 1 5 T scanner:  T1w axial; T2w axial, sagittal and coronal; DWI axial and ADC map; T1w water, fat, in-phase and opposed-phase axials of entire pelvis and dynamic 3D T1w axial before and during   IV contrast injection      CONTRAST:  Gadobutrol (Gadavist) 17 mL of Gadobutrol injection (SINGLE-DOSE) ml      TECHNICAL LIMITATIONS: None         FINDINGS:     PROSTATE:     Size (AP x TRV x CC): 5 9 x 5 7 x 6 1 = 107 mL  Post-biopsy hemorrhage:  None  Central gland enlargement (BPH): Marked      Focal lesions - No dominant lesion  Heterogeneous peripheral zone  PI-RADSv2 Category 2 - Low (clinically significant cancer unlikely)      Note: Clinically significant cancer is defined on pathology/histology as Bishnu score greater than or equal to 7, and/or volume of greater than or equal to 0 5 mL, and/or extraprostatic extension      Seminal vesicles: Unremarkable      URINARY BLADDER: Mildly thickened wall, likely a sequela of chronic bladder outlet obstruction      LYMPH NODES: No pelvic lymphadenopathy      BONES: No suspicious osseous lesion         IMPRESSION:     1  PI-RADSv2 Category 2 - Low (clinically significant cancer is unlikely to be present)      2  No extraprostatic tumor, seminal vesicle invasion, pelvic lymphadenopathy, or pelvic osseous metastatic disease      3  Marked BPH with calculated prostate volume of 107 mL  PATHOLOGY:     2/2017 - 138grams on TRUS  Prostate pathology from biopsy - negative      12/2009      ASSESSMENT:     64 y o  male with enlarged prostate, >130g, negative prostate biopsy due to prior elevated PSA and moderate LUTs on dual therapy    PLAN:     Patient has run out of Proscar over the last 6 months and this is likely reason for rebound PSA elevation  I have refilled his Proscar today  I will recheck his PSA and expect resolution over the next 6 months  He will return in 1 year with PSA assuming the 6 month value declines  He will remain on maximum therapy, double-dose Flomax and Proscar    I have also refilled some defer lean cream for the patient's balanitis related to his obesity and body habitus

## 2021-10-09 ENCOUNTER — APPOINTMENT (OUTPATIENT)
Dept: RADIOLOGY | Facility: CLINIC | Age: 61
End: 2021-10-09
Payer: COMMERCIAL

## 2021-10-09 PROCEDURE — 71046 X-RAY EXAM CHEST 2 VIEWS: CPT

## 2021-10-16 DIAGNOSIS — N40.1 BENIGN PROSTATIC HYPERPLASIA WITH LOWER URINARY TRACT SYMPTOMS, SYMPTOM DETAILS UNSPECIFIED: ICD-10-CM

## 2021-10-18 RX ORDER — TAMSULOSIN HYDROCHLORIDE 0.4 MG/1
CAPSULE ORAL
Qty: 180 CAPSULE | Refills: 1 | Status: SHIPPED | OUTPATIENT
Start: 2021-10-18 | End: 2022-06-27 | Stop reason: SDUPTHER

## 2022-06-27 DIAGNOSIS — N40.1 BENIGN PROSTATIC HYPERPLASIA WITH LOWER URINARY TRACT SYMPTOMS, SYMPTOM DETAILS UNSPECIFIED: ICD-10-CM

## 2022-06-27 RX ORDER — FINASTERIDE 5 MG/1
5 TABLET, FILM COATED ORAL DAILY
Qty: 90 TABLET | Refills: 3 | Status: SHIPPED | OUTPATIENT
Start: 2022-06-27

## 2022-06-27 RX ORDER — TAMSULOSIN HYDROCHLORIDE 0.4 MG/1
0.4 CAPSULE ORAL 2 TIMES DAILY
Qty: 180 CAPSULE | Refills: 3 | Status: SHIPPED | OUTPATIENT
Start: 2022-06-27

## 2022-08-08 NOTE — ASSESSMENT & PLAN NOTE
· With mild exacerbation due to COVID-19 infection  · Continue Symbicort and albuterol t i d /q i d    · Respiratory protocol   · Please see COVID treatment outline above Paramedian Forehead Flap Text: A decision was made to reconstruct the defect utilizing an interpolation axial flap and a staged reconstruction.  A telfa template was made of the defect.  This telfa template was then used to outline the paramedian forehead pedicle flap.  The donor area for the pedicle flap was then injected with anesthesia.  The flap was excised through the skin and subcutaneous tissue down to the layer of the underlying musculature.  The pedicle flap was carefully excised within this deep plane to maintain its blood supply.  The edges of the donor site were undermined.   The donor site was closed in a primary fashion.  The pedicle was then rotated into position and sutured.  Once the tube was sutured into place, adequate blood supply was confirmed with blanching and refill.  The pedicle was then wrapped with xeroform gauze and dressed appropriately with a telfa and gauze bandage to ensure continued blood supply and protect the attached pedicle.

## 2022-09-07 ENCOUNTER — APPOINTMENT (OUTPATIENT)
Dept: LAB | Facility: MEDICAL CENTER | Age: 62
End: 2022-09-07
Payer: COMMERCIAL

## 2022-09-07 DIAGNOSIS — R97.20 ELEVATED PSA: ICD-10-CM

## 2022-09-07 DIAGNOSIS — N40.1 BENIGN PROSTATIC HYPERPLASIA WITH LOWER URINARY TRACT SYMPTOMS, SYMPTOM DETAILS UNSPECIFIED: ICD-10-CM

## 2022-09-07 LAB — PSA SERPL-MCNC: 23.8 NG/ML (ref 0–4)

## 2022-09-07 PROCEDURE — 84153 ASSAY OF PSA TOTAL: CPT

## 2022-09-08 DIAGNOSIS — N40.1 BENIGN PROSTATIC HYPERPLASIA WITH LOWER URINARY TRACT SYMPTOMS, SYMPTOM DETAILS UNSPECIFIED: Primary | ICD-10-CM

## 2022-09-09 ENCOUNTER — OFFICE VISIT (OUTPATIENT)
Dept: UROLOGY | Facility: CLINIC | Age: 62
End: 2022-09-09
Payer: COMMERCIAL

## 2022-09-09 VITALS
SYSTOLIC BLOOD PRESSURE: 126 MMHG | HEART RATE: 74 BPM | BODY MASS INDEX: 43.13 KG/M2 | DIASTOLIC BLOOD PRESSURE: 80 MMHG | WEIGHT: 315 LBS

## 2022-09-09 DIAGNOSIS — R97.20 ELEVATED PSA: Primary | ICD-10-CM

## 2022-09-09 DIAGNOSIS — N40.1 BENIGN PROSTATIC HYPERPLASIA WITH LOWER URINARY TRACT SYMPTOMS, SYMPTOM DETAILS UNSPECIFIED: ICD-10-CM

## 2022-09-09 PROCEDURE — 99213 OFFICE O/P EST LOW 20 MIN: CPT

## 2022-09-09 RX ORDER — CYCLOBENZAPRINE HCL 10 MG
10 TABLET ORAL 2 TIMES DAILY PRN
COMMUNITY
Start: 2022-07-14

## 2022-09-09 NOTE — PROGRESS NOTES
9/9/2022    No chief complaint on file  Assessment and Plan    58 y o  male manage by Dr Joana Peace    1  Elevated PSA  · PSA Trend  · 23 8 (9/07/2022)  · 22 1 (4/14/2021)  · 4 7 (1/15/2020)  · Negative transrectal biopsy 2017  · mpMRI 12/04/2018 PI-RADS 2  · Dr Joana Peace ordered repeat mpMRI, we will get him scheduled for this  · Follow-up 3 months after mpMRI      2  BPH  · Continue finasteride 5 mg daily  · Continue tamsulosin 0 4 mg BID  · Markedly enlarged prostate 138 g      History of Present Illness  Guru Milligan is a 58 y o  male here for annual evaluation of elevated PSA and BPH  Patient of Dr Joana Peace with long standing history of elevated PSA  He has had 2- prostate biopsies 1 in 2009 and another in 2017  In addition he had a mpMRI of the prostate in 2018 which showed a PI-RADS category 2 lesion  He has also been noted to have a markedly enlarged prostate at 138 g  His most recent PSA is 23 8 as of 09/07/2022 previously 22 1 on 04/14/2021 and 4 7 on 01/15/2020  Recommendations by Dr Joana Peace this for a repeat mpMRI of the prostate to reevaluate  In regards to his BPH he has been doing well on tamsulosin 0 4 mg b i d  and finasteride 5 mg daily  Review of Systems   Constitutional: Negative for chills and fever  HENT: Negative for congestion and sore throat  Respiratory: Negative for cough and shortness of breath  Cardiovascular: Negative for chest pain and leg swelling  Gastrointestinal: Negative for abdominal pain, constipation, diarrhea, nausea and vomiting  Genitourinary: Negative for difficulty urinating, dysuria, flank pain, frequency, hematuria and urgency  Musculoskeletal: Negative for back pain and gait problem  Skin: Negative for wound  Allergic/Immunologic: Negative for immunocompromised state  Neurological: Negative for dizziness, weakness and numbness  Hematological: Does not bruise/bleed easily             AUA SYMPTOM SCORE Flowsheet Row Most Recent Value   AUA SYMPTOM SCORE    How often have you had a sensation of not emptying your bladder completely after you finished urinating? 1 (P)     How often have you had to urinate again less than two hours after you finished urinating? 1 (P)     How often have you found you stopped and started again several times when you urinate? 1 (P)     How often have you found it difficult to postpone urination? 1 (P)     How often have you had a weak urinary stream? 2 (P)     How often have you had to push or strain to begin urination? 1 (P)     How many times did you most typically get up to urinate from the time you went to bed at night until the time you got up in the morning? 2 (P)     Quality of Life: If you were to spend the rest of your life with your urinary condition just the way it is now, how would you feel about that? 2 (P)     AUA SYMPTOM SCORE 9 (P)           Vitals  Vitals:    09/09/22 0943   BP: 126/80   Pulse: 74   Weight: (!) 144 kg (318 lb)       Physical Exam  Vitals reviewed  Constitutional:       General: He is not in acute distress  Appearance: Normal appearance  He is not ill-appearing or toxic-appearing  HENT:      Head: Normocephalic and atraumatic  Eyes:      General: No scleral icterus  Conjunctiva/sclera: Conjunctivae normal    Cardiovascular:      Rate and Rhythm: Normal rate  Pulmonary:      Effort: Pulmonary effort is normal  No respiratory distress  Abdominal:      Tenderness: There is no abdominal tenderness  There is no right CVA tenderness or left CVA tenderness  Hernia: No hernia is present  Genitourinary:     Comments: MOJGAN reveals smooth symmetric prostate, no palpable nodules masses  Musculoskeletal:      Cervical back: Normal range of motion  Right lower leg: No edema  Left lower leg: No edema  Skin:     General: Skin is warm and dry  Coloration: Skin is not jaundiced or pale     Neurological:      General: No focal deficit present  Mental Status: He is alert and oriented to person, place, and time  Mental status is at baseline  Gait: Gait normal    Psychiatric:         Mood and Affect: Mood normal          Behavior: Behavior normal          Thought Content:  Thought content normal          Judgment: Judgment normal          Past History  Past Medical History:   Diagnosis Date    Acute respiratory failure with hypoxia ([facility] [address] ) 3/23/2021    Arthritis     Asthma     Enlarged prostate     Hyperlipidemia     Hypertension     Hyponatremia 3/25/2021    Lumbar herniated disc      Social History     Socioeconomic History    Marital status: /Civil Union     Spouse name: None    Number of children: None    Years of education: None    Highest education level: None   Occupational History    Occupation: laboror   Tobacco Use    Smoking status: Never Smoker    Smokeless tobacco: Never Used   Vaping Use    Vaping Use: Never used   Substance and Sexual Activity    Alcohol use: Yes     Comment: occ    Drug use: Never    Sexual activity: Not Currently   Other Topics Concern    None   Social History Narrative    None     Social Determinants of Health     Financial Resource Strain: Not on file   Food Insecurity: Not on file   Transportation Needs: Not on file   Physical Activity: Not on file   Stress: Not on file   Social Connections: Not on file   Intimate Partner Violence: Not on file   Housing Stability: Not on file     Social History     Tobacco Use   Smoking Status Never Smoker   Smokeless Tobacco Never Used     Family History   Problem Relation Age of Onset    Alzheimer's disease Mother     Kidney disease Father     Diabetes Father     Alzheimer's disease Father        The following portions of the patient's history were reviewed and updated as appropriate allergies, current medications, past medical history, past social history, past surgical history and problem list    Imagin/04/2018  MULTIPARAMETRIC MRI OF THE PROSTATE WITH AND WITHOUT CONTRAST      INDICATION:   R97 20: Elevated prostate specific antigen (PSA)      COMPARISON: None      PSA LEVEL: 19 3 ng/ml  on 2018  Ilir Lyons PRIOR BIOPSY DATE:   BIOPSY RESULTS: Negative      TECHNIQUE: The following pulse sequences were obtained on a 1 5 T scanner:  T1w axial; T2w axial, sagittal and coronal; DWI axial and ADC map; T1w water, fat, in-phase and opposed-phase axials of entire pelvis and dynamic 3D T1w axial before and during   IV contrast injection      CONTRAST:  Gadobutrol (Gadavist) 17 mL of Gadobutrol injection (SINGLE-DOSE) ml      TECHNICAL LIMITATIONS: None         FINDINGS:     PROSTATE:     Size (AP x TRV x CC): 5 9 x 5 7 x 6 1 = 107 mL  Post-biopsy hemorrhage:  None  Central gland enlargement (BPH): Marked      Focal lesions - No dominant lesion  Heterogeneous peripheral zone  PI-RADSv2 Category 2 - Low (clinically significant cancer unlikely)      Note: Clinically significant cancer is defined on pathology/histology as Bishnu score greater than or equal to 7, and/or volume of greater than or equal to 0 5 mL, and/or extraprostatic extension      Seminal vesicles: Unremarkable      URINARY BLADDER: Mildly thickened wall, likely a sequela of chronic bladder outlet obstruction      LYMPH NODES: No pelvic lymphadenopathy      BONES: No suspicious osseous lesion         IMPRESSION:     1  PI-RADSv2 Category 2 - Low (clinically significant cancer is unlikely to be present)      2  No extraprostatic tumor, seminal vesicle invasion, pelvic lymphadenopathy, or pelvic osseous metastatic disease      3  Marked BPH with calculated prostate volume of 107 mL            Results  No results found for this or any previous visit (from the past 1 hour(s)) ]  Lab Results   Component Value Date    PSA 23 8 (H) 2022    PSA 22 1 (H) 2021    PSA 4 7 (H) 01/15/2020     Lab Results   Component Value Date CALCIUM 9 0 03/28/2021    K 4 1 03/28/2021    CO2 25 03/28/2021     03/28/2021    BUN 23 03/28/2021    CREATININE 0 82 03/28/2021     Lab Results   Component Value Date    WBC 12 60 (H) 03/28/2021    HGB 13 8 (L) 03/28/2021    HCT 40 8 (L) 03/28/2021    MCV 92 03/28/2021     03/28/2021       Please Note:  Voice dictation software has been used to create this document  There may be inadvertent transcriptions errors       Camila Laws

## 2022-10-12 PROBLEM — J12.82 PNEUMONIA DUE TO COVID-19 VIRUS: Status: RESOLVED | Noted: 2021-03-22 | Resolved: 2022-10-12

## 2022-10-12 PROBLEM — U07.1 PNEUMONIA DUE TO COVID-19 VIRUS: Status: RESOLVED | Noted: 2021-03-22 | Resolved: 2022-10-12

## 2022-10-12 PROBLEM — U07.1 SEPSIS DUE TO COVID-19 (HCC): Status: RESOLVED | Noted: 2021-03-22 | Resolved: 2022-10-12

## 2022-10-12 PROBLEM — A41.89 SEPSIS DUE TO COVID-19 (HCC): Status: RESOLVED | Noted: 2021-03-22 | Resolved: 2022-10-12

## 2022-10-24 ENCOUNTER — HOSPITAL ENCOUNTER (OUTPATIENT)
Dept: RADIOLOGY | Age: 62
Discharge: HOME | End: 2022-10-24
Payer: COMMERCIAL

## 2022-10-24 DIAGNOSIS — N40.1 BENIGN PROSTATIC HYPERPLASIA WITH LOWER URINARY TRACT SYMPTOMS, SYMPTOM DETAILS UNSPECIFIED: ICD-10-CM

## 2022-10-24 PROCEDURE — A9585 GADOBUTROL INJECTION: HCPCS | Performed by: UROLOGY

## 2022-10-24 PROCEDURE — 76377 3D RENDER W/INTRP POSTPROCES: CPT

## 2022-10-24 PROCEDURE — 72197 MRI PELVIS W/O & W/DYE: CPT

## 2022-10-24 PROCEDURE — G1004 CDSM NDSC: HCPCS

## 2022-10-24 RX ADMIN — GADOBUTROL 14 ML: 604.72 INJECTION INTRAVENOUS at 10:23

## 2022-10-27 ENCOUNTER — TELEPHONE (OUTPATIENT)
Dept: UROLOGY | Facility: CLINIC | Age: 62
End: 2022-10-27

## 2022-10-27 NOTE — TELEPHONE ENCOUNTER
MRI results received  Has followup in December  Please let patient know overall results look good  He has a very large but benign appearing prostate (150g, in fact further growth compared to 107g in 2018) but there is no nodule/target lesion, and overall PI-RADS two score is very low risk for occult prostate cancer  Two prior negative biopsies

## 2022-12-16 ENCOUNTER — TELEMEDICINE (OUTPATIENT)
Dept: UROLOGY | Facility: CLINIC | Age: 62
End: 2022-12-16

## 2022-12-16 DIAGNOSIS — R97.20 ELEVATED PSA: Primary | ICD-10-CM

## 2022-12-16 DIAGNOSIS — N40.1 BENIGN PROSTATIC HYPERPLASIA WITH LOWER URINARY TRACT SYMPTOMS, SYMPTOM DETAILS UNSPECIFIED: ICD-10-CM

## 2022-12-16 RX ORDER — TAMSULOSIN HYDROCHLORIDE 0.4 MG/1
0.4 CAPSULE ORAL 2 TIMES DAILY
Qty: 180 CAPSULE | Refills: 3 | Status: SHIPPED | OUTPATIENT
Start: 2022-12-16

## 2022-12-16 RX ORDER — FINASTERIDE 5 MG/1
5 TABLET, FILM COATED ORAL DAILY
Qty: 90 TABLET | Refills: 3 | Status: SHIPPED | OUTPATIENT
Start: 2022-12-16

## 2022-12-16 NOTE — PROGRESS NOTES
Virtual Brief Visit    Patient is located in the following state in which I hold an active license [place]    PATY: Ishmael Valdez is an 58 y o  male with a history of an enlarged prostate  Patient has prior urinary retention and is managed on double dose Flomax and finasteride  Because of his PSA, the patient is undergone 2 prior negative biopsies for cancer  Prostate size has increased from his prior MRI in 2017 from a size of 100 g to now 155 g  Last year when the patient was seen, his PSA had risen significantly to 22 however he had been off of his finasteride medication due to pharmacy refill issue  We felt comfortable continued surveillance at that time  This year in September, PSA remained in the 20s  Patient had reported being on the Proscar at that time and so I recommended repeat multi parametric MRI which does not show any specific concerning lesion  PI-RADS 2  At this current time, the patient reports that he has remained on the Proscar religiously  He is out of refills as of several days ago but has otherwise been taking the medication  He has no new urinary symptoms of concern  Assessment/Plan: I remain concerned about the PSA over 20  Patient does have some obturator lymph nodes which are less than 1 cm and stable since 2017  There are no signs of bony disease on his MRI  His MRI does not show a specific lesion, PI-RADS 2  I have recommended that we recheck the patient's PSA in 1 month  If the number remains elevated, I will more strongly recommend transperineal biopsy at that time  Given the patient's obesity, cardiopulmonary comorbidities and need for blood thinners, biopsy is not without significant risk  Would have to again counseled the patient if the PSA remains high      Problem List Items Addressed This Visit        Genitourinary    Benign prostatic hyperplasia with lower urinary tract symptoms (Chronic)    Relevant Medications    finasteride (PROSCAR) 5 mg tablet    tamsulosin (FLOMAX) 0 4 mg       Recent Visits  No visits were found meeting these conditions  Showing recent visits within past 7 days and meeting all other requirements  Future Appointments  No visits were found meeting these conditions    Showing future appointments within next 150 days and meeting all other requirements         Visit Time    Visit Start Time: 952  Visit Stop Time: 536  Total Visit Duration: 15 minutes

## 2022-12-30 NOTE — ASSESSMENT & PLAN NOTE
Date of last visit:  12/1/2022  Date of next visit:  2/20/2023    Requested Prescriptions     Pending Prescriptions Disp Refills    furosemide (LASIX) 20 MG tablet 60 tablet 1     Sig: TAKE 1 TABLET BY MOUTH DAILY AS NEEDED (LEG EDEMA)    esomeprazole (NEXIUM) 40 MG delayed release capsule 60 capsule 5     Sig: TAKE 1 CAPSULE BY MOUTH TWICE A DAY · Noted by fever and tachypnea  · Secondary to COVID infection

## 2023-06-01 NOTE — ASSESSMENT & PLAN NOTE
[facility] Quality Flow/Interdisciplinary Rounds Progress Note        Quality Flow Rounds held on June 1, 2023    Disciplines Attending:  Bedside Nurse, , , and Nursing Unit Leadership    Traci Boyer was admitted on 5/31/2023  8:17 PM    Anticipated Discharge Date:       Disposition:    Cedric Score:  Cedric Scale Score: 21    Readmission Risk              Risk of Unplanned Readmission:  0           Discussed patient goal for the day, patient clinical progression, and barriers to discharge.   The following Goal(s) of the Day/Commitment(s) have been identified:  Diagnostics - Report Results and Labs - Report Results      Delaney Frances RN  June 1, 2023 · Continue Proscar and Flomax

## 2023-12-08 DIAGNOSIS — N40.1 BENIGN PROSTATIC HYPERPLASIA WITH LOWER URINARY TRACT SYMPTOMS, SYMPTOM DETAILS UNSPECIFIED: ICD-10-CM

## 2023-12-08 RX ORDER — TAMSULOSIN HYDROCHLORIDE 0.4 MG/1
0.4 CAPSULE ORAL 2 TIMES DAILY
Qty: 180 CAPSULE | Refills: 0 | Status: SHIPPED | OUTPATIENT
Start: 2023-12-08

## 2023-12-09 ENCOUNTER — APPOINTMENT (OUTPATIENT)
Dept: LAB | Facility: MEDICAL CENTER | Age: 63
End: 2023-12-09
Payer: COMMERCIAL

## 2023-12-09 DIAGNOSIS — N40.1 BENIGN PROSTATIC HYPERPLASIA WITH LOWER URINARY TRACT SYMPTOMS, SYMPTOM DETAILS UNSPECIFIED: ICD-10-CM

## 2023-12-09 DIAGNOSIS — R97.20 ELEVATED PSA: ICD-10-CM

## 2023-12-09 PROCEDURE — 84153 ASSAY OF PSA TOTAL: CPT

## 2023-12-09 PROCEDURE — 84154 ASSAY OF PSA FREE: CPT

## 2023-12-09 PROCEDURE — 36415 COLL VENOUS BLD VENIPUNCTURE: CPT

## 2023-12-12 LAB
PSA FREE MFR SERPL: 12.3 %
PSA FREE SERPL-MCNC: 2.77 NG/ML
PSA SERPL-MCNC: 22.5 NG/ML (ref 0–4)

## 2023-12-13 ENCOUNTER — TELEPHONE (OUTPATIENT)
Dept: UROLOGY | Facility: CLINIC | Age: 63
End: 2023-12-13

## 2023-12-13 NOTE — TELEPHONE ENCOUNTER
Called patient to inform him that his PSA was 22.5. Informed the pt that Dr. Latasha Baumann would like to have a physical visit with him to continue following his elevated psa. Pt is scheduled to see Dr. Latasha Baumann on 1/16/24 at 1:15 pm at [facility]. Pt confirmed date, time, and location.

## 2023-12-13 NOTE — TELEPHONE ENCOUNTER
----- Message from Eulalia Obrien MD sent at 12/13/2023 12:16 PM EST -----  Can we please make sure this patient has physical visit to see me?  Last visit 2022, need to continue to follow elevated PSA

## 2024-01-10 DIAGNOSIS — N40.1 BENIGN PROSTATIC HYPERPLASIA WITH LOWER URINARY TRACT SYMPTOMS, SYMPTOM DETAILS UNSPECIFIED: ICD-10-CM

## 2024-01-10 RX ORDER — FINASTERIDE 5 MG/1
5 TABLET, FILM COATED ORAL DAILY
Qty: 90 TABLET | Refills: 3 | Status: SHIPPED | OUTPATIENT
Start: 2024-01-10

## 2024-01-16 ENCOUNTER — TELEPHONE (OUTPATIENT)
Dept: UROLOGY | Facility: CLINIC | Age: 64
End: 2024-01-16

## 2024-01-16 ENCOUNTER — APPOINTMENT (OUTPATIENT)
Dept: LAB | Facility: MEDICAL CENTER | Age: 64
End: 2024-01-16
Payer: COMMERCIAL

## 2024-01-16 ENCOUNTER — OFFICE VISIT (OUTPATIENT)
Dept: UROLOGY | Facility: CLINIC | Age: 64
End: 2024-01-16
Payer: COMMERCIAL

## 2024-01-16 VITALS
BODY MASS INDEX: 45.03 KG/M2 | HEART RATE: 115 BPM | WEIGHT: 315 LBS | SYSTOLIC BLOOD PRESSURE: 128 MMHG | DIASTOLIC BLOOD PRESSURE: 80 MMHG | OXYGEN SATURATION: 96 %

## 2024-01-16 DIAGNOSIS — R97.20 ELEVATED PSA: Primary | ICD-10-CM

## 2024-01-16 DIAGNOSIS — N40.1 BENIGN PROSTATIC HYPERPLASIA WITH LOWER URINARY TRACT SYMPTOMS, SYMPTOM DETAILS UNSPECIFIED: ICD-10-CM

## 2024-01-16 DIAGNOSIS — N40.1 BENIGN PROSTATIC HYPERPLASIA WITH LOWER URINARY TRACT SYMPTOMS, SYMPTOM DETAILS UNSPECIFIED: Primary | ICD-10-CM

## 2024-01-16 LAB
ANION GAP SERPL CALCULATED.3IONS-SCNC: 10 MMOL/L
BUN SERPL-MCNC: 18 MG/DL (ref 5–25)
CALCIUM SERPL-MCNC: 9.2 MG/DL (ref 8.4–10.2)
CHLORIDE SERPL-SCNC: 105 MMOL/L (ref 96–108)
CO2 SERPL-SCNC: 23 MMOL/L (ref 21–32)
CREAT SERPL-MCNC: 1.01 MG/DL (ref 0.6–1.3)
CREAT UR-MCNC: 191.4 MG/DL
GFR SERPL CREATININE-BSD FRML MDRD: 78 ML/MIN/1.73SQ M
GLUCOSE P FAST SERPL-MCNC: 106 MG/DL (ref 65–99)
MICROALBUMIN UR-MCNC: 14.3 MG/L
MICROALBUMIN/CREAT 24H UR: 7 MG/G CREATININE (ref 0–30)
POST-VOID RESIDUAL VOLUME, ML POC: 37 ML
POTASSIUM SERPL-SCNC: 3.7 MMOL/L (ref 3.5–5.3)
SODIUM SERPL-SCNC: 138 MMOL/L (ref 135–147)

## 2024-01-16 PROCEDURE — 51798 US URINE CAPACITY MEASURE: CPT | Performed by: UROLOGY

## 2024-01-16 PROCEDURE — 80048 BASIC METABOLIC PNL TOTAL CA: CPT

## 2024-01-16 PROCEDURE — 99214 OFFICE O/P EST MOD 30 MIN: CPT | Performed by: UROLOGY

## 2024-01-16 PROCEDURE — 82043 UR ALBUMIN QUANTITATIVE: CPT

## 2024-01-16 PROCEDURE — 36415 COLL VENOUS BLD VENIPUNCTURE: CPT

## 2024-01-16 PROCEDURE — 82570 ASSAY OF URINE CREATININE: CPT

## 2024-01-16 RX ORDER — ALPRAZOLAM 1 MG/1
1 TABLET ORAL ONCE
Qty: 1 TABLET | Refills: 0 | Status: SHIPPED | OUTPATIENT
Start: 2024-01-16 | End: 2024-01-16

## 2024-01-16 RX ORDER — TRAMADOL HYDROCHLORIDE 50 MG/1
50 TABLET ORAL EVERY 6 HOURS PRN
COMMUNITY

## 2024-01-16 RX ORDER — ZOLPIDEM TARTRATE 6.25 MG/1
5 TABLET, FILM COATED, EXTENDED RELEASE ORAL
COMMUNITY

## 2024-01-16 RX ORDER — MONTELUKAST SODIUM 10 MG/1
10 TABLET ORAL DAILY
COMMUNITY
Start: 2024-01-02

## 2024-01-16 RX ORDER — ACETAMINOPHEN 500 MG
2 TABLET ORAL EVERY 6 HOURS PRN
COMMUNITY

## 2024-01-16 NOTE — Clinical Note
I would like to schedule the patient for a transperineal biopsy.  Prior MRI last year was PI-RADS 2.  I am repeating the MRI this year and I expected to be stable.  I will contact you to adjust the fusion if there is anything PI-RADS 3 or greater.

## 2024-01-16 NOTE — PROGRESS NOTES
UROLOGY ROUTINE FOLLOW UP NOTE     CHIEF COMPLAINT   Gene Zapata is a 63 y.o. male with a complaint of   Chief Complaint   Patient presents with    Elevated PSA       History of Present Illness:     63 y.o. male with a history of enlarged prostate and elevated PSA. Previously seen by my partner, Dr. Keyon Ortiz. Last seen in 2011. Patient was on Proscar for lower urinary tract symptoms. He had previously undergone a prostate biopsy in 2009 which was negative for cancer.     Patient denies prostate cancer history in his family. His brother has undergone a transrectal resection of prostate for obstructive urinary symptoms.    He presented again in 2017 with acute urinary retention. He was transferred from an outside hospital due to difficult placing a Fowler catheter the emergency room. Catheter was able to be placed at [facility]. 800 mL plus was returned. The patient was started on Flomax.     After evaluation for his gross hematuria, patient was restarted on Proscar. Hematuria workup was undertaken. The patient did undergo a cystoscopy and a second transrectal biopsy the prostate in February 2017.     He was noted to have a markedly enlarged prostate at 138 g. He did well after the prostate biopsy and had minimal issues or side effects.    Lab Results   Component Value Date    PSA 22.5 (H) 12/09/2023    PSA 23.8 (H) 09/07/2022    PSA 22.1 (H) 04/14/2021     The patient  had dramatic reduction in his PSA on Proscar.  His PSA previously approach 20 but went down to 4.7.      Patient returned having come off his Proscar .  He ran out of the medication.  Unfortunately, we have seen a expected rebound of his PSA.  He has noted worsening of his urinary symptoms. Replaced on Proscar at that point.    Telemedicine visit last 12/2022. PSA was still elevated. Patient reported continued use of finasteride. Recommended recheck at that point but this was not completed.    Past Medical History:     Past Medical History:    Diagnosis Date    Acute respiratory failure with hypoxia (HCC) 3/23/2021    Arthritis     Asthma     Enlarged prostate     Hyperlipidemia     Hypertension     Hyponatremia 3/25/2021    Lumbar herniated disc        PAST SURGICAL HISTORY:     Past Surgical History:   Procedure Laterality Date    KNEE SURGERY N/A     LUNG BIOPSY      PROSTATE BIOPSY         CURRENT MEDICATIONS:     Current Outpatient Medications   Medication Sig Dispense Refill    albuterol (PROVENTIL HFA,VENTOLIN HFA) 90 mcg/act inhaler Inhale 2 puffs every 6 (six) hours as needed for wheezing      apixaban (ELIQUIS) 5 mg 2 tablets twice a day until June 8, then on June 9 change to 1 tablet twice a day.      budesonide-formoterol (SYMBICORT) 160-4.5 mcg/act inhaler Inhale 2 puffs      Cholecalciferol (VITAMIN D3 PO) Take 1 tablet by mouth daily      cyclobenzaprine (FLEXERIL) 10 mg tablet Take 10 mg by mouth 2 (two) times a day as needed      finasteride (PROSCAR) 5 mg tablet Take 1 tablet (5 mg total) by mouth daily 90 tablet 3    gabapentin (NEURONTIN) 300 mg capsule Take 300 mg by mouth 2 (two) times a day      lisinopril (ZESTRIL) 40 mg tablet Take 40 mg by mouth daily      multivitamin (THERAGRAN) TABS Take 1 tablet by mouth daily      Omega-3 Fatty Acids (FISH OIL) 1200 MG CAPS Take 1 capsule by mouth 2 (two) times a day      predniSONE 10 mg tablet Take 2 tablets (20 mg total) by mouth daily Take 20 mg x 2 days, take 10 mg x 2 days then stop (Patient not taking: No sig reported) 6 tablet 0    ranitidine (ZANTAC) 150 mg tablet Take 150 mg by mouth 2 (two) times a day (Patient not taking: Reported on 9/9/2022)      Red Yeast Rice Extract (RED YEAST RICE PO) Take by mouth      tamsulosin (FLOMAX) 0.4 mg Take 1 capsule (0.4 mg total) by mouth 2 (two) times a day 180 capsule 0     No current facility-administered medications for this visit.       ALLERGIES:     Allergies   Allergen Reactions    Percocet [Oxycodone-Acetaminophen] Itching        SOCIAL HISTORY:     Social History     Socioeconomic History    Marital status: /Civil Union     Spouse name: Not on file    Number of children: Not on file    Years of education: Not on file    Highest education level: Not on file   Occupational History    Occupation: laboror   Tobacco Use    Smoking status: Never    Smokeless tobacco: Never   Vaping Use    Vaping status: Never Used   Substance and Sexual Activity    Alcohol use: Yes     Comment: occ    Drug use: Never    Sexual activity: Not Currently   Other Topics Concern    Not on file   Social History Narrative    Not on file     Social Determinants of Health     Financial Resource Strain: Not on file   Food Insecurity: Not on file   Transportation Needs: Not on file   Physical Activity: Not on file   Stress: Not on file   Social Connections: Not on file   Intimate Partner Violence: Not on file   Housing Stability: Not on file       SOCIAL HISTORY:     Family History   Problem Relation Age of Onset    Alzheimer's disease Mother     Kidney disease Father     Diabetes Father     Alzheimer's disease Father        REVIEW OF SYSTEMS:     Review of Systems   Constitutional: Negative.    Respiratory: Negative.     Cardiovascular: Negative.    Genitourinary:  Positive for frequency. Negative for difficulty urinating and urgency.   Musculoskeletal: Negative.    Neurological: Negative.    Psychiatric/Behavioral: Negative.       PHYSICAL EXAM:     Wt (!) 151 kg (332 lb)   BMI 45.03 kg/m²     General:  Healthy appearing   But obesemale in no acute distress.  They have a normal affect.  There is not appear to be any gross neurologic defects or abnormalities.  HEENT:  Normocephalic, atraumatic.  Neck is supple without any palpable lymphadenopathy  Cardiovascular:  Patient has normal palpable distal radial pulses.  There is no significant peripheral edema. No JVD is noted.  Respiratory:  Patient has unlabored respirations.  There is no audible wheeze or  rhonchi.  Abdomen:  Abdomen is obese without surgical scars.  Abdomen is soft and nontender.  There is no tympany.  Inguinal and umbilical hernia are not appreciated.   :  Patient is circumcised but given his obesity and body habitus, has some balanitis, status a colles are descended, is rectal exam is enlarged but smooth  Musculoskeletal:  Patient does not have significant CVA tenderness in the  flank with palpation or percussion.  They full range of motion in all 4 extremities.  Strength in all 4 extremities appears congruent.  Patient is able to ambulate without assistance or difficulty.  Dermatologic:  Patient has no skin abnormalities or rashes.      LABS:     CBC:   Lab Results   Component Value Date    WBC 12.60 (H) 2021    HGB 13.8 (L) 2021    HCT 40.8 (L) 2021    MCV 92 2021     2021       BMP:   Lab Results   Component Value Date    CALCIUM 9.0 2021    K 4.1 2021    CO2 25 2021     2021    BUN 23 2021    CREATININE 0.82 2021       PSA from May 17, 2016 demonstrates a total PSA of 16.8   PSA from 18 19.3  Lab Results   Component Value Date    PSA 22.5 (H) 2023    PSA 23.8 (H) 2022    PSA 22.1 (H) 2021     IMAGIN/4/18  MULTIPARAMETRIC MRI OF THE PROSTATE WITH AND WITHOUT CONTRAST      INDICATION:   R97.20: Elevated prostate specific antigen (PSA).     COMPARISON: None.     PSA LEVEL: 19.3 ng/ml  on 2018..  PRIOR BIOPSY DATE: .  BIOPSY RESULTS: Negative.     TECHNIQUE: The following pulse sequences were obtained on a 1.5 T scanner:  T1w axial; T2w axial, sagittal and coronal; DWI axial and ADC map; T1w water, fat, in-phase and opposed-phase axials of entire pelvis and dynamic 3D T1w axial before and during   IV contrast injection.     CONTRAST:  Gadobutrol (Gadavist) 17 mL of Gadobutrol injection (SINGLE-DOSE) ml.     TECHNICAL LIMITATIONS: None.        FINDINGS:     PROSTATE:     Size (AP  x TRV x CC): 5.9 x 5.7 x 6.1 = 107 mL.     Post-biopsy hemorrhage:  None.    Central gland enlargement (BPH): Marked.     Focal lesions - No dominant lesion. Heterogeneous peripheral zone. PI-RADSv2 Category 2 - Low (clinically significant cancer unlikely).     Note: Clinically significant cancer is defined on pathology/histology as Bishnu score greater than or equal to 7, and/or volume of greater than or equal to 0.5 mL, and/or extraprostatic extension.     Seminal vesicles: Unremarkable.     URINARY BLADDER: Mildly thickened wall, likely a sequela of chronic bladder outlet obstruction.     LYMPH NODES: No pelvic lymphadenopathy.     BONES: No suspicious osseous lesion.        IMPRESSION:     1. PI-RADSv2 Category 2 - Low (clinically significant cancer is unlikely to be present).     2. No extraprostatic tumor, seminal vesicle invasion, pelvic lymphadenopathy, or pelvic osseous metastatic disease.     3. Marked BPH with calculated prostate volume of 107 mL.    PATHOLOGY:     2/2017 - 138grams on TRUS  Prostate pathology from biopsy - negative      12/2009      ASSESSMENT:     63 y.o. male with enlarged prostate, >130g, negative prostate biopsy due to prior elevated PSA and moderate LUTs on dual therapy    PLAN:     I remain very concerned that the patient's PSA.  Despite ongoing finasteride use confirmed by the patient, his PSA has not reduced to the degree we saw previously when he was first started on the medication.  PSA at that time reduced to 4.  Knowing the patient has an enlarged prostate greater than 150 g, I expect an elevated PSA but persistent PSA in the 20s remains suspicious.  Additionally, the patient's last multiparametric MRI demonstrated obturator nodes.  Unfortunately, the patient did not repeat his PSA last year but we have not seen significant progression.  I have recommended we repeat the multiparametric MRI but at this point in time I recommend that we proceed for transperineal biopsy.  If  there is an abnormality within the prostate greater than PI-RADS 3, would consider fusion biopsy.  Additionally we will carefully inspected the prior obturator lymphadenopathy patient is agreeable to the plan.

## 2024-01-25 ENCOUNTER — HOSPITAL ENCOUNTER (OUTPATIENT)
Facility: MEDICAL CENTER | Age: 64
Discharge: HOME | End: 2024-01-25
Payer: COMMERCIAL

## 2024-01-25 DIAGNOSIS — N40.1 BENIGN PROSTATIC HYPERPLASIA WITH LOWER URINARY TRACT SYMPTOMS, SYMPTOM DETAILS UNSPECIFIED: ICD-10-CM

## 2024-01-25 PROCEDURE — 76377 3D RENDER W/INTRP POSTPROCES: CPT

## 2024-01-25 PROCEDURE — G1004 CDSM NDSC: HCPCS

## 2024-01-25 PROCEDURE — A9585 GADOBUTROL INJECTION: HCPCS | Performed by: UROLOGY

## 2024-01-25 PROCEDURE — 72197 MRI PELVIS W/O & W/DYE: CPT

## 2024-01-25 RX ORDER — GADOBUTROL 604.72 MG/ML
15 INJECTION INTRAVENOUS
Status: COMPLETED | OUTPATIENT
Start: 2024-01-25 | End: 2024-01-25

## 2024-01-25 RX ADMIN — GADOBUTROL 15 ML: 604.72 INJECTION INTRAVENOUS at 12:02

## 2024-01-26 ENCOUNTER — TELEPHONE (OUTPATIENT)
Dept: UROLOGY | Facility: MEDICAL CENTER | Age: 64
End: 2024-01-26

## 2024-02-02 NOTE — TELEPHONE ENCOUNTER
I just spoke to Gene about his MRI.    I and he still want to move fwd with transperineal biopsy.    He is reachable on his cell, [phone], he mentioned he did not get a message from your prior note on 1/26/24 if you can try to get back in touch with him.    Thanks

## 2024-03-01 DIAGNOSIS — N40.1 BENIGN PROSTATIC HYPERPLASIA WITH LOWER URINARY TRACT SYMPTOMS, SYMPTOM DETAILS UNSPECIFIED: ICD-10-CM

## 2024-03-01 RX ORDER — TAMSULOSIN HYDROCHLORIDE 0.4 MG/1
0.4 CAPSULE ORAL 2 TIMES DAILY
Qty: 180 CAPSULE | Refills: 1 | Status: SHIPPED | OUTPATIENT
Start: 2024-03-01

## 2024-03-05 ENCOUNTER — APPOINTMENT (OUTPATIENT)
Dept: LAB | Facility: HOSPITAL | Age: 64
End: 2024-03-05
Payer: COMMERCIAL

## 2024-03-05 ENCOUNTER — OFFICE VISIT (OUTPATIENT)
Dept: LAB | Facility: HOSPITAL | Age: 64
End: 2024-03-05
Payer: COMMERCIAL

## 2024-03-05 DIAGNOSIS — R97.20 ELEVATED PROSTATE SPECIFIC ANTIGEN (PSA): ICD-10-CM

## 2024-03-05 LAB
ALBUMIN SERPL BCP-MCNC: 4.4 G/DL (ref 3.5–5)
ALP SERPL-CCNC: 47 U/L (ref 34–104)
ALT SERPL W P-5'-P-CCNC: 32 U/L (ref 7–52)
ANION GAP SERPL CALCULATED.3IONS-SCNC: 10 MMOL/L
AST SERPL W P-5'-P-CCNC: 28 U/L (ref 13–39)
BASOPHILS # BLD AUTO: 0.05 THOUSANDS/ÂΜL (ref 0–0.1)
BASOPHILS NFR BLD AUTO: 1 % (ref 0–1)
BILIRUB SERPL-MCNC: 0.6 MG/DL (ref 0.2–1)
BUN SERPL-MCNC: 16 MG/DL (ref 5–25)
CALCIUM SERPL-MCNC: 9.5 MG/DL (ref 8.4–10.2)
CHLORIDE SERPL-SCNC: 103 MMOL/L (ref 96–108)
CO2 SERPL-SCNC: 23 MMOL/L (ref 21–32)
CREAT SERPL-MCNC: 1.02 MG/DL (ref 0.6–1.3)
EOSINOPHIL # BLD AUTO: 0.22 THOUSAND/ÂΜL (ref 0–0.61)
EOSINOPHIL NFR BLD AUTO: 3 % (ref 0–6)
ERYTHROCYTE [DISTWIDTH] IN BLOOD BY AUTOMATED COUNT: 13.1 % (ref 11.6–15.1)
GFR SERPL CREATININE-BSD FRML MDRD: 77 ML/MIN/1.73SQ M
GLUCOSE P FAST SERPL-MCNC: 109 MG/DL (ref 65–99)
HCT VFR BLD AUTO: 46.3 % (ref 36.5–49.3)
HGB BLD-MCNC: 14.8 G/DL (ref 12–17)
IMM GRANULOCYTES # BLD AUTO: 0.01 THOUSAND/UL (ref 0–0.2)
IMM GRANULOCYTES NFR BLD AUTO: 0 % (ref 0–2)
LYMPHOCYTES # BLD AUTO: 2 THOUSANDS/ÂΜL (ref 0.6–4.47)
LYMPHOCYTES NFR BLD AUTO: 31 % (ref 14–44)
MCH RBC QN AUTO: 31.3 PG (ref 26.8–34.3)
MCHC RBC AUTO-ENTMCNC: 32 G/DL (ref 31.4–37.4)
MCV RBC AUTO: 98 FL (ref 82–98)
MONOCYTES # BLD AUTO: 0.72 THOUSAND/ÂΜL (ref 0.17–1.22)
MONOCYTES NFR BLD AUTO: 11 % (ref 4–12)
NEUTROPHILS # BLD AUTO: 3.4 THOUSANDS/ÂΜL (ref 1.85–7.62)
NEUTS SEG NFR BLD AUTO: 54 % (ref 43–75)
NRBC BLD AUTO-RTO: 0 /100 WBCS
PLATELET # BLD AUTO: 213 THOUSANDS/UL (ref 149–390)
PMV BLD AUTO: 9.7 FL (ref 8.9–12.7)
POTASSIUM SERPL-SCNC: 4.1 MMOL/L (ref 3.5–5.3)
PROT SERPL-MCNC: 7.6 G/DL (ref 6.4–8.4)
RBC # BLD AUTO: 4.73 MILLION/UL (ref 3.88–5.62)
SODIUM SERPL-SCNC: 136 MMOL/L (ref 135–147)
WBC # BLD AUTO: 6.4 THOUSAND/UL (ref 4.31–10.16)

## 2024-03-05 PROCEDURE — 85025 COMPLETE CBC W/AUTO DIFF WBC: CPT

## 2024-03-05 PROCEDURE — 87086 URINE CULTURE/COLONY COUNT: CPT

## 2024-03-05 PROCEDURE — 80053 COMPREHEN METABOLIC PANEL: CPT

## 2024-03-05 PROCEDURE — 93005 ELECTROCARDIOGRAM TRACING: CPT

## 2024-03-05 PROCEDURE — 36415 COLL VENOUS BLD VENIPUNCTURE: CPT

## 2024-03-06 LAB
ATRIAL RATE: 70 BPM
BACTERIA UR CULT: NORMAL
P AXIS: 46 DEGREES
PR INTERVAL: 182 MS
QRS AXIS: 27 DEGREES
QRSD INTERVAL: 108 MS
QT INTERVAL: 386 MS
QTC INTERVAL: 416 MS
T WAVE AXIS: 15 DEGREES
VENTRICULAR RATE: 70 BPM

## 2024-03-06 PROCEDURE — 93010 ELECTROCARDIOGRAM REPORT: CPT | Performed by: INTERNAL MEDICINE

## 2024-03-10 NOTE — H&P
H&P Exam - Urology   Gene Zapata 63 y.o. male MRN: [id number]  Unit/Bed#:  Encounter: [id number]    Assessment/Plan     Assessment:  Elevated PSA of 22.5  Previous biopsies negative for malignancy  Plan:  Transrectal ultrasound-guided supersaturational transperineal needle biopsy of the prostate    History of Present Illness   HPI:  Gene Zapata is a 63 y.o. male who presents with a history of an enlarged prostate and elevated PSA.  The patient had been placed on Proscar for lower urinary tract symptoms undergoing a prostate biopsy in 2009 negative for cancer.  The patient has no familial cancer history with his brother having history of TURP for benign disease.  The patient returned in 2017 with urinary retention was started on tamsulosin and after evaluation of gross hematuria was restarted on Proscar.  A second transrectal biopsy 2017 also did not show malignancy.  The patient has a markedly enlarged prostate at 130 g by multiparametric MRI which does not show any evidence of a distinct prostate lesion considered a PI-RADS 2 study.  Because of rising PSA transrectal ultrasound-guided transperineal needle biopsies of the prostate using supersaturation technique as requested.  I met the patient in the holding area discussed the proposed procedure step-by-step answered all patient questions performed history and physical discussed risks and complications of false negative biopsies bleeding infection retention potential need for catheter or operative intervention.  The patient expressed understanding and provided verbal and signed informed consent..    Review of Systems    Historical Information   Past Medical History:   Diagnosis Date    Acute respiratory failure with hypoxia (HCC) 3/23/2021    Arthritis     Asthma     Enlarged prostate     Hyperlipidemia     Hypertension     Hyponatremia 3/25/2021    Lumbar herniated disc      Past Surgical History:   Procedure Laterality Date    KNEE SURGERY N/A     LUNG BIOPSY       PROSTATE BIOPSY       Social History   Social History     Substance and Sexual Activity   Alcohol Use Yes    Comment: occ     Social History     Substance and Sexual Activity   Drug Use Never     Social History     Tobacco Use   Smoking Status Never   Smokeless Tobacco Never     Family History:   Family History   Problem Relation Age of Onset    Alzheimer's disease Mother     Kidney disease Father     Diabetes Father     Alzheimer's disease Father        Meds/Allergies   all medications and allergies reviewed  Allergies   Allergen Reactions    Percocet [Oxycodone-Acetaminophen] Itching       Objective   Vitals: There were no vitals taken for this visit.    No intake/output data recorded.    Invasive Devices       None                   Physical Exam  Vitals reviewed.   Constitutional:       Appearance: Normal appearance.   HENT:      Head: Normocephalic and atraumatic.      Nose: Nose normal.      Mouth/Throat:      Mouth: Mucous membranes are moist.   Eyes:      Extraocular Movements: Extraocular movements intact.   Cardiovascular:      Rate and Rhythm: Normal rate and regular rhythm.   Pulmonary:      Effort: Pulmonary effort is normal. No respiratory distress.      Breath sounds: No wheezing, rhonchi or rales.   Abdominal:      General: There is no distension.      Palpations: Abdomen is soft.   Musculoskeletal:         General: Normal range of motion.   Skin:     General: Skin is dry.   Neurological:      Mental Status: He is alert and oriented to person, place, and time.   Psychiatric:         Mood and Affect: Mood normal.         Behavior: Behavior normal.         Thought Content: Thought content normal.         Judgment: Judgment normal.         Lab Results: I have personally reviewed pertinent reports.    Imaging: I have personally reviewed pertinent reports.   and I have personally reviewed pertinent films in PACS  EKG, Pathology, and Other Studies: I have personally reviewed pertinent reports.   and I  have personally reviewed pertinent films in PACS  VTE Prophylaxis: Sequential compression device (Venodyne)     Code Status: Prior  Advance Directive and Living Will:      Power of :    POLST:      Counseling / Coordination of Care  Total floor / unit time spent today 30 minutes.  Greater than 50% of total time was spent with the patient and / or family counseling and / or coordination of care.  A description of the counseling / coordination of care:  .

## 2024-03-10 NOTE — DISCHARGE INSTRUCTIONS
Vigorous oral fluid intake and limited activity for 24 to 48 hours.  Report any excessive bleeding difficulty urinating or fevers . Restart blood thinners in 2 days if no visible blood      prostate Biopsy   WHAT YOU NEED TO KNOW:   A prostate biopsy is a procedure to remove samples of tissue from your prostate gland. The prostate is a male sex gland that makes fluid found in semen. It is located just below the bladder. After the samples are removed, they are sent to a lab and tested for cancer.       DISCHARGE INSTRUCTIONS:   Seek care immediately if:   You have heavy bleeding from your rectum.    You urinate very little or not at all.    You have pain from your procedure that gets worse, even after you take pain medicine.    Call your urologist or doctor if:   You have a fever or chills.    You feel pain or burning when you urinate.    Your urine is cloudy or smells bad.    You have questions or concerns about your condition or care.    Medicines:  You may need any of the following:  Antibiotics  help prevent or treat a bacterial infection.    Acetaminophen  decreases pain and fever. It is available without a doctor's order. Ask how much to take and how often to take it. Follow directions. Read the labels of all other medicines you are using to see if they also contain acetaminophen, or ask your doctor or pharmacist. Acetaminophen can cause liver damage if not taken correctly.    Prescription pain medicine  may be given. Ask your healthcare provider how to take this medicine safely. Some prescription pain medicines contain acetaminophen. Do not take other medicines that contain acetaminophen without talking to your healthcare provider. Too much acetaminophen may cause liver damage. Prescription pain medicine may cause constipation. Ask your healthcare provider how to prevent or treat constipation.     Take your medicine as directed.  Contact your healthcare provider if you think your medicine is not helping or if  you have side effects. Tell your provider if you are allergic to any medicine. Keep a list of the medicines, vitamins, and herbs you take. Include the amounts, and when and why you take them. Bring the list or the pill bottles to follow-up visits. Carry your medicine list with you in case of an emergency.    Follow up with your urologist or doctor as directed:  You may need to return for more tests or procedures. Write down your questions so you remember to ask them during your visits.  © Copyright [employer] 2023 Information is for End User's use only and may not be sold, redistributed or otherwise used for commercial purposes.  The above information is an  only. It is not intended as medical advice for individual conditions or treatments. Talk to your doctor, nurse or pharmacist before following any medical regimen to see if it is safe and effective for you.

## 2024-03-15 ENCOUNTER — PREP FOR PROCEDURE (OUTPATIENT)
Dept: UROLOGY | Facility: MEDICAL CENTER | Age: 64
End: 2024-03-15

## 2024-03-15 RX ORDER — EZETIMIBE 10 MG/1
10 TABLET ORAL
COMMUNITY
Start: 2024-01-22

## 2024-03-15 NOTE — PRE-PROCEDURE INSTRUCTIONS
Pre-Surgery Instructions:   Medication Instructions    acetaminophen (TYLENOL) 500 mg tablet Uses PRN- OK to take day of surgery    albuterol (PROVENTIL HFA,VENTOLIN HFA) 90 mcg/act inhaler Uses PRN- OK to take day of surgery    apixaban (ELIQUIS) 5 mg Instructions provided by DORIAN Johnson x 2 days    budesonide-formoterol (SYMBICORT) 160-4.5 mcg/act inhaler Take day of surgery.    Cholecalciferol (VITAMIN D3 PO) Stop taking 7 days prior to surgery.    cyclobenzaprine (FLEXERIL) 10 mg tablet Uses PRN- OK to take day of surgery    ezetimibe (ZETIA) 10 mg tablet Take night before surgery    finasteride (PROSCAR) 5 mg tablet Take day of surgery.    gabapentin (NEURONTIN) 300 mg capsule Take day of surgery.    lisinopril (ZESTRIL) 40 mg tablet Take night before surgery    montelukast (SINGULAIR) 10 mg tablet Take day of surgery.    multivitamin (THERAGRAN) TABS Stop taking 7 days prior to surgery.    Omega-3 Fatty Acids (FISH OIL) 1200 MG CAPS Stop taking 7 days prior to surgery.    Red Yeast Rice Extract (RED YEAST RICE PO) Stop taking 7 days prior to surgery.    tamsulosin (FLOMAX) 0.4 mg Take day of surgery.    traMADol (ULTRAM) 50 mg tablet Uses PRN- OK to take day of surgery    zolpidem (AMBIEN CR) 6.25 MG CR tablet Take night before surgery   Medication instructions for day surgery reviewed. Please use only a sip of water to take your instructed medications. Avoid all over the counter vitamins, supplements and NSAIDS for one week prior to surgery per anesthesia guidelines. Tylenol is ok to take as needed.     You will receive a call one business day prior to surgery with an arrival time and hospital directions. If your surgery is scheduled on a Monday, the hospital will be calling you on the Friday prior to your surgery. If you have not heard from anyone by 8pm, please call the hospital supervisor through the hospital  at [phone]. ([place] [phone] or [place] [phone]).    Do not eat or drink  anything after midnight the night before your surgery, including candy, mints, lifesavers, or chewing gum. Do not drink alcohol 24hrs before your surgery. Try not to smoke at least 24hrs before your surgery.       Follow the pre surgery showering instructions as listed in the “My Surgical Experience Booklet” or otherwise provided by your surgeon's office. Do not use a blade to shave the surgical area 1 week before surgery. It is okay to use a clean electric clippers up to 24 hours before surgery. Do not apply any lotions, creams, including makeup, cologne, deodorant, or perfumes after showering on the day of your surgery. Do not use dry shampoo, hair spray, hair gel, or any type of hair products.     No contact lenses, eye make-up, or artificial eyelashes. Remove nail polish, including gel polish, and any artificial, gel, or acrylic nails if possible. Remove all jewelry including rings and body piercing jewelry.     Wear causal clothing that is easy to take on and off. Consider your type of surgery.    Keep any valuables, jewelry, piercings at home. Please bring any specially ordered equipment (sling, braces) if indicated.    Arrange for a responsible person to drive you to and from the hospital on the day of your surgery. Please confirm the visitor policy for the day of your procedure when you receive your phone call with an arrival time.     Call the surgeon's office with any new illnesses, exposures, or additional questions prior to surgery.    Please reference your “My Surgical Experience Booklet” for additional information to prepare for your upcoming surgery.

## 2024-03-21 ENCOUNTER — ANESTHESIA EVENT (OUTPATIENT)
Dept: PERIOP | Facility: HOSPITAL | Age: 64
End: 2024-03-21
Payer: COMMERCIAL

## 2024-03-22 ENCOUNTER — ANESTHESIA (OUTPATIENT)
Dept: PERIOP | Facility: HOSPITAL | Age: 64
End: 2024-03-22
Payer: COMMERCIAL

## 2024-03-22 ENCOUNTER — HOSPITAL ENCOUNTER (OUTPATIENT)
Facility: HOSPITAL | Age: 64
Setting detail: OUTPATIENT SURGERY
Discharge: HOME | End: 2024-03-22
Attending: UROLOGY | Admitting: UROLOGY
Payer: COMMERCIAL

## 2024-03-22 VITALS
BODY MASS INDEX: 42.66 KG/M2 | DIASTOLIC BLOOD PRESSURE: 64 MMHG | RESPIRATION RATE: 16 BRPM | HEART RATE: 78 BPM | TEMPERATURE: 97.1 F | HEIGHT: 72 IN | SYSTOLIC BLOOD PRESSURE: 128 MMHG | WEIGHT: 315 LBS | OXYGEN SATURATION: 94 %

## 2024-03-22 DIAGNOSIS — R97.20 ELEVATED PROSTATE SPECIFIC ANTIGEN (PSA): ICD-10-CM

## 2024-03-22 PROBLEM — E66.01 CLASS 3 SEVERE OBESITY DUE TO EXCESS CALORIES WITH SERIOUS COMORBIDITY AND BODY MASS INDEX (BMI) OF 40.0 TO 44.9 IN ADULT (HCC): Status: ACTIVE | Noted: 2024-03-22

## 2024-03-22 PROBLEM — I82.409 DVT (DEEP VENOUS THROMBOSIS) (HCC): Status: ACTIVE | Noted: 2024-03-22

## 2024-03-22 PROBLEM — E66.813 CLASS 3 SEVERE OBESITY DUE TO EXCESS CALORIES WITH SERIOUS COMORBIDITY AND BODY MASS INDEX (BMI) OF 40.0 TO 44.9 IN ADULT (HCC): Status: ACTIVE | Noted: 2024-03-22

## 2024-03-22 PROBLEM — I26.99 PE (PULMONARY THROMBOEMBOLISM) (HCC): Status: ACTIVE | Noted: 2024-03-22

## 2024-03-22 PROCEDURE — 55700 PR PROSTATE NEEDLE BIOPSY ANY APPROACH: CPT | Performed by: UROLOGY

## 2024-03-22 PROCEDURE — G0416 PROSTATE BIOPSY, ANY MTHD: HCPCS | Performed by: PATHOLOGY

## 2024-03-22 PROCEDURE — 76942 ECHO GUIDE FOR BIOPSY: CPT | Performed by: UROLOGY

## 2024-03-22 PROCEDURE — 88342 IMHCHEM/IMCYTCHM 1ST ANTB: CPT | Performed by: PATHOLOGY

## 2024-03-22 PROCEDURE — 88344 IMHCHEM/IMCYTCHM EA MLT ANTB: CPT | Performed by: PATHOLOGY

## 2024-03-22 PROCEDURE — NC001 PR NO CHARGE: Performed by: UROLOGY

## 2024-03-22 RX ORDER — PROPOFOL 10 MG/ML
INJECTION, EMULSION INTRAVENOUS AS NEEDED
Status: DISCONTINUED | OUTPATIENT
Start: 2024-03-22 | End: 2024-03-22

## 2024-03-22 RX ORDER — ONDANSETRON 2 MG/ML
INJECTION INTRAMUSCULAR; INTRAVENOUS AS NEEDED
Status: DISCONTINUED | OUTPATIENT
Start: 2024-03-22 | End: 2024-03-22

## 2024-03-22 RX ORDER — LIDOCAINE HYDROCHLORIDE 20 MG/ML
INJECTION, SOLUTION EPIDURAL; INFILTRATION; INTRACAUDAL; PERINEURAL AS NEEDED
Status: DISCONTINUED | OUTPATIENT
Start: 2024-03-22 | End: 2024-03-22

## 2024-03-22 RX ORDER — FENTANYL CITRATE/PF 50 MCG/ML
25 SYRINGE (ML) INJECTION
Status: DISCONTINUED | OUTPATIENT
Start: 2024-03-22 | End: 2024-03-22 | Stop reason: HOSPADM

## 2024-03-22 RX ORDER — LIDOCAINE HCL/EPINEPHRINE/PF 2%-1:200K
VIAL (ML) INJECTION AS NEEDED
Status: DISCONTINUED | OUTPATIENT
Start: 2024-03-22 | End: 2024-03-22 | Stop reason: HOSPADM

## 2024-03-22 RX ORDER — ONDANSETRON 2 MG/ML
4 INJECTION INTRAMUSCULAR; INTRAVENOUS ONCE AS NEEDED
Status: DISCONTINUED | OUTPATIENT
Start: 2024-03-22 | End: 2024-03-22 | Stop reason: HOSPADM

## 2024-03-22 RX ORDER — SODIUM CHLORIDE 9 MG/ML
125 INJECTION, SOLUTION INTRAVENOUS CONTINUOUS
Status: DISCONTINUED | OUTPATIENT
Start: 2024-03-22 | End: 2024-03-22

## 2024-03-22 RX ORDER — FENTANYL CITRATE 50 UG/ML
INJECTION, SOLUTION INTRAMUSCULAR; INTRAVENOUS AS NEEDED
Status: DISCONTINUED | OUTPATIENT
Start: 2024-03-22 | End: 2024-03-22

## 2024-03-22 RX ORDER — PROPOFOL 10 MG/ML
INJECTION, EMULSION INTRAVENOUS CONTINUOUS PRN
Status: DISCONTINUED | OUTPATIENT
Start: 2024-03-22 | End: 2024-03-22

## 2024-03-22 RX ADMIN — PROPOFOL 50 MG: 10 INJECTION, EMULSION INTRAVENOUS at 12:16

## 2024-03-22 RX ADMIN — LIDOCAINE HYDROCHLORIDE 40 MG: 20 INJECTION, SOLUTION EPIDURAL; INFILTRATION; INTRACAUDAL at 12:16

## 2024-03-22 RX ADMIN — PROPOFOL 100 MCG/KG/MIN: 10 INJECTION, EMULSION INTRAVENOUS at 12:16

## 2024-03-22 RX ADMIN — FENTANYL CITRATE 100 MCG: 50 INJECTION INTRAMUSCULAR; INTRAVENOUS at 12:13

## 2024-03-22 RX ADMIN — ONDANSETRON 4 MG: 2 INJECTION INTRAMUSCULAR; INTRAVENOUS at 12:16

## 2024-03-22 RX ADMIN — SODIUM CHLORIDE 125 ML/HR: 0.9 INJECTION, SOLUTION INTRAVENOUS at 11:09

## 2024-03-22 RX ADMIN — CEFAZOLIN 3000 MG: 1 INJECTION, POWDER, FOR SOLUTION INTRAMUSCULAR; INTRAVENOUS; PARENTERAL at 12:12

## 2024-03-22 NOTE — INTERVAL H&P NOTE
H&P reviewed. After examining the patient I find no changes in the patients condition since the H&P had been written.    Vitals:    03/22/24 1107   BP: 166/88   Pulse: 80   Resp: 18   Temp: 97.8 °F (36.6 °C)   SpO2: 97%

## 2024-03-22 NOTE — OP NOTE
OPERATIVE REPORT  PATIENT NAME: Gene Zapata    :  1960  MRN: [id number]  Pt Location: [facility] OR ROOM 03    SURGERY DATE: 3/22/2024    Surgeons and Role:     * Lg Fernandez MD - Primary    Preop Diagnosis:  Elevated prostate specific antigen (PSA) [R97.20]  Increased PSA velocity    Post-Op Diagnosis Codes:     * Elevated prostate specific antigen (PSA) [R97.20]  Increased PSA velocity    Procedure(s):  TRANSPERINEAL TRANSRECTAL SUPERSATURATIONAL ULTRASOUND GUIDED BIOPSY PROSTATE    Specimen(s):  ID Type Source Tests Collected by Time Destination   1 : RIGHT ANTERIOR MEDIAL Tissue Prostate TISSUE EXAM Lg Fernandez MD 3/22/2024 1204    2 : Right Anterior lateral Tissue Prostate TISSUE EXAM Lg Fernandez MD 3/22/2024 1204    3 : Right posterior lateral Tissue Prostate TISSUE EXAM Lg Fernandez MD 3/22/2024 1204    4 : Left Anterior Medial Tissue Prostate TISSUE EXAM Lg Fernandez MD 3/22/2024 1204    5 : left anterior lateral Tissue Prostate TISSUE EXAM Lg Fernandez MD 3/22/2024 1204    6 : Left posterior lateral Tissue Prostate TISSUE EXAM Lg Fernandez MD 3/22/2024 1204    7 : left posterior medial Tissue Prostate TISSUE EXAM Lg Fernandez MD 3/22/2024 1204    8 : Left posterior base Tissue Prostate TISSUE EXAM Lg Fernandez MD 3/22/2024 1204    9 : right posterior medial Tissue Prostate TISSUE EXAM Lg Fernandez MD 3/22/2024 1204    10 : right posterior base Tissue Prostate TISSUE EXAM Lg Fernandez MD 3/22/2024 1204    11 : Left transitional zone Tissue Prostate TISSUE EXAM Lg Fernandez MD 3/22/2024 1206    12 : Right transitional zone Tissue Prostate TISSUE EXAM Lg Fernandez MD 3/22/2024 1206        Estimated Blood Loss:   Minimal    Drains:  * No LDAs found *    Anesthesia Type:   IV Sedation with Anesthesia    Operative Indications:  Elevated prostate specific antigen (PSA) [R97.20]  Increased PSA velocity    Operative Findings:  See  operative note below    Complications:   None    Procedure and Technique:  I saw the patient in the holding area discussed his particular case of rising PSA despite finasteride and despite a PI-RADS 2 MRI.  Supersaturation of biopsies were discussed step-by-step patient questions answered physical examination performed risks and complications discussed.  The patient was taken to the operating identified by the surgeon placed on the operating table in dorsolithotomy position after intravenous sedation was induced and appropriate timeout took place.  Lidocaine 2% with epinephrine was used to anesthetize perineal skin and immediate subcutaneous tissue and after the introduction of a transrectal condom cover lubricated ultrasound probe per anus into the rectal vault subcutaneous tissue near the apices of the prostate was also anesthetized using the same mixture.  Using precision point technology 2 cores per region in 12 regions were biopsied using the spring-loaded core biopsy needle gun.  A total of 24 cores were obtained.  After this all equipment was removed from the patient except for the ultrasound probe was used to hold pressure on the prostate transrectally.  Pressure was released after 2 minutes the probe removed and the patient taken to the PACU where he recovered uneventfully.  He will follow-up with his urologist to discuss biopsy results in 1 to 2 weeks.  There were no complications   I was present for the entire procedure. and I was present for all critical portions of the procedure.    Patient Disposition:  PACU         SIGNATURE: Lg Fernandez MD  DATE: March 22, 2024  TIME: 12:40 PM

## 2024-03-22 NOTE — ANESTHESIA POSTPROCEDURE EVALUATION
Post-Op Assessment Note    CV Status:  Stable    Pain management: adequate       Mental Status:  Alert and awake   Hydration Status:  Euvolemic   PONV Controlled:  Controlled   Airway Patency:  Patent  Two or more mitigation strategies used for obstructive sleep apnea   Post Op Vitals Reviewed: Yes    No anethesia notable event occurred.    Staff: Anesthesiologist               BP      Temp      Pulse     Resp      SpO2      /63   Pulse 86   Temp (!) 97 °F (36.1 °C) (Temporal)   Resp 16   Ht 6' (1.829 m)   Wt (!) 145 kg (320 lb 8.8 oz)   SpO2 94%   BMI 43.47 kg/m²

## 2024-03-22 NOTE — ANESTHESIA PREPROCEDURE EVALUATION
Procedure:  TRANSPERINEAL ULTRASOUND GUIDED BIOPSY PROSTATE (Perineum)    Relevant Problems   CARDIO   (+) DVT (deep venous thrombosis) (HCC)   (+) Essential hypertension   (+) PE (pulmonary thromboembolism) (HCC)      /RENAL   (+) Benign prostatic hyperplasia with lower urinary tract symptoms      PULMONARY   (+) Moderate asthma without complication      Other   (+) Class 3 severe obesity due to excess calories with serious comorbidity and body mass index (BMI) of 40.0 to 44.9 in adult (HCC)   (+) History of DVT (deep vein thrombosis)        Physical Exam    Airway    Mallampati score: III  TM Distance: >3 FB  Neck ROM: full     Dental   No notable dental hx     Cardiovascular  Rhythm: regular, Rate: normal, Cardiovascular exam normal    Pulmonary  Pulmonary exam normal Breath sounds clear to auscultation    Other Findings        Anesthesia Plan  ASA Score- 3     Anesthesia Type- IV sedation with anesthesia with ASA Monitors.         Additional Monitors:     Airway Plan:     Comment: GA prn.       Plan Factors-    Chart reviewed.   Existing labs reviewed. Patient summary reviewed.    Patient is not a current smoker.  Patient instructed to abstain from smoking on day of procedure. Patient did not smoke on day of surgery.    There is medical exclusion for perioperative obstructive sleep apnea risk education.        Induction-     Postoperative Plan-     Informed Consent- Anesthetic plan and risks discussed with patient and spouse (Kamala).

## 2024-04-01 NOTE — PROGRESS NOTES
ASSESSMENT:     63 y.o. male with enlarged prostate, >130g, negative prostate biopsy due to prior elevated PSA and moderate LUTs on dual therapy    PLAN:     Now s/p three prostate biopsies (2009, 2017 both transrectal; and now transperineal in 2024). At most, atypical glands. At this point, would continue finasteride/Proscar and continue monitoring.    Patient has very large prostate, >130g. PSA certainly elevated as is PSA density, especially considering use of 5-alpha reductase inhibitor.  However, we have been unable to uncover any dangerous cancer on 3 separate biopsies    Continue PSA monitoring. Next in 6mos.    Patient had a prior episode of retention.  Urinary symptoms are well-controlled on dual therapy.  Today, we discussed options for large volume prostate glands from a surgical and interventional standpoint.  Patient does have surgical risk given his obesity, prior pulmonary emboli and anticoagulation use.  Patient provided information to do further review.  He is not interested in surgical therapy at this point    -        UROLOGY ROUTINE FOLLOW UP NOTE     CHIEF COMPLAINT   Gene Zapata is a 63 y.o. male with a complaint of   Chief Complaint   Patient presents with    Follow-up     Pathology review       History of Present Illness:     63 y.o. male with a history of enlarged prostate and elevated PSA. Previously seen by my partner, Dr. Keyon Ortiz. Last seen in 2011. Patient was on Proscar for lower urinary tract symptoms. He had previously undergone a prostate biopsy in 2009 which was negative for cancer.     Patient denies prostate cancer history in his family. His brother has undergone a transrectal resection of prostate for obstructive urinary symptoms.    He presented again in 2017 with acute urinary retention. He was transferred from an outside hospital due to difficult placing a Fowler catheter the emergency room. Catheter was able to be placed at [facility]. 800 mL plus was returned. The  patient was started on Flomax.     After evaluation for his gross hematuria, patient was restarted on Proscar. Hematuria workup was undertaken. The patient did undergo a cystoscopy and a second transrectal biopsy the prostate in February 2017.     He was noted to have a markedly enlarged prostate at 138 g. He did well after the prostate biopsy and had minimal issues or side effects.    Lab Results   Component Value Date    PSA 22.5 (H) 12/09/2023    PSA 23.8 (H) 09/07/2022    PSA 22.1 (H) 04/14/2021     The patient  had dramatic reduction in his PSA on Proscar.  His PSA previously approach 20 but went down to 4.7.      Patient returned having come off his Proscar .  He ran out of the medication.  Unfortunately, we have seen a expected rebound of his PSA.  He has noted worsening of his urinary symptoms. Replaced on Proscar at that point.    Telemedicine visit last 12/2022. PSA was still elevated. Patient reported continued use of finasteride. Recommended recheck at that point but this was not completed. Given abnormal PSA, recommend repeat mpMRI (PIRADs-2). Transperineal biopsy completed with rare atypical glands.    Past Medical History:     Past Medical History:   Diagnosis Date    Acute respiratory failure with hypoxia (HCC) 03/23/2021    Arthritis     Asthma     Colon polyp     DVT (deep venous thrombosis) (HCC) 3/22/2024    right leg    Enlarged prostate     Hyperlipidemia     Hypertension     Hyponatremia 03/25/2021    Lumbar herniated disc     PE (pulmonary thromboembolism) ([facility]) 3/22/2024       PAST SURGICAL HISTORY:     Past Surgical History:   Procedure Laterality Date    COLONOSCOPY      DENTAL SURGERY      KNEE SURGERY N/A     LUNG BIOPSY      NH BX PROSTATE STRTCTC SATURATION SAMPLING IMG GID N/A 3/22/2024    Procedure: TRANSPERINEAL ULTRASOUND GUIDED BIOPSY PROSTATE;  Surgeon: Lg Fernandez MD;  Location: [facility];  Service: Urology    PROSTATE BIOPSY      SHOULDER SURGERY Left     rotator cuff  repair    WISDOM TOOTH EXTRACTION         CURRENT MEDICATIONS:     Current Outpatient Medications   Medication Sig Dispense Refill    acetaminophen (TYLENOL) 500 mg tablet Take 2 tablets by mouth every 6 (six) hours as needed      albuterol (PROVENTIL HFA,VENTOLIN HFA) 90 mcg/act inhaler Inhale 2 puffs every 6 (six) hours as needed for wheezing      ALPRAZolam (XANAX) 1 mg tablet Take 1 tablet (1 mg total) by mouth 1 (one) time for 1 dose Take one hour prior to procedure, must have  to and from 1 tablet 0    apixaban (ELIQUIS) 5 mg 2 tablets twice a day until June 8, then on June 9 change to 1 tablet twice a day.      budesonide-formoterol (SYMBICORT) 160-4.5 mcg/act inhaler Inhale 2 puffs 2 (two) times a day      Cholecalciferol (VITAMIN D3 PO) Take 1 tablet by mouth daily      cyclobenzaprine (FLEXERIL) 10 mg tablet Take 10 mg by mouth 2 (two) times a day as needed      ezetimibe (ZETIA) 10 mg tablet Take 10 mg by mouth in the evening. Take before meals      finasteride (PROSCAR) 5 mg tablet Take 1 tablet (5 mg total) by mouth daily 90 tablet 3    gabapentin (NEURONTIN) 300 mg capsule Take 300 mg by mouth 2 (two) times a day      lisinopril (ZESTRIL) 40 mg tablet Take 40 mg by mouth daily at bedtime      montelukast (SINGULAIR) 10 mg tablet Take 10 mg by mouth daily      multivitamin (THERAGRAN) TABS Take 1 tablet by mouth daily      Omega-3 Fatty Acids (FISH OIL) 1200 MG CAPS Take 1 capsule by mouth 2 (two) times a day      Red Yeast Rice Extract (RED YEAST RICE PO) Take by mouth      tamsulosin (FLOMAX) 0.4 mg Take 1 capsule (0.4 mg total) by mouth 2 (two) times a day 180 capsule 1    traMADol (ULTRAM) 50 mg tablet Take 50 mg by mouth every 6 (six) hours as needed for moderate pain      zolpidem (AMBIEN CR) 6.25 MG CR tablet Take 5 mg by mouth daily at bedtime as needed for sleep       No current facility-administered medications for this visit.       ALLERGIES:     Allergies   Allergen Reactions     Percocet [Oxycodone-Acetaminophen] Itching       SOCIAL HISTORY:     Social History     Socioeconomic History    Marital status: /Civil Union     Spouse name: None    Number of children: None    Years of education: None    Highest education level: None   Occupational History    Occupation: laboror   Tobacco Use    Smoking status: Never    Smokeless tobacco: Never   Vaping Use    Vaping status: Never Used   Substance and Sexual Activity    Alcohol use: Yes     Comment: occ- 1-2 x per month liquor    Drug use: Never    Sexual activity: Yes   Other Topics Concern    None   Social History Narrative    None     Social Determinants of Health     Financial Resource Strain: Not on file   Food Insecurity: Not on file   Transportation Needs: Not on file   Physical Activity: Not on file   Stress: Not on file   Social Connections: Not on file   Intimate Partner Violence: Not on file   Housing Stability: Not on file       SOCIAL HISTORY:     Family History   Problem Relation Age of Onset    Alzheimer's disease Mother     Kidney disease Father     Diabetes Father     Alzheimer's disease Father        REVIEW OF SYSTEMS:     Review of Systems   Constitutional: Negative.    Respiratory: Negative.     Cardiovascular: Negative.    Genitourinary:  Positive for frequency. Negative for difficulty urinating and urgency.   Musculoskeletal: Negative.    Neurological: Negative.    Psychiatric/Behavioral: Negative.       PHYSICAL EXAM:     /84 (BP Location: Left arm, Patient Position: Sitting, Cuff Size: Large)   Pulse 73   Ht 6' (1.829 m)   Wt (!) 145 kg (319 lb 3.2 oz)   SpO2 98%   BMI 43.29 kg/m²     General:  Healthy appearing   But obesemale in no acute distress.  They have a normal affect.  There is not appear to be any gross neurologic defects or abnormalities.  HEENT:  Normocephalic, atraumatic.  Neck is supple without any palpable lymphadenopathy  Cardiovascular:  Patient has normal palpable distal radial pulses.   There is no significant peripheral edema. No JVD is noted.  Respiratory:  Patient has unlabored respirations.  There is no audible wheeze or rhonchi.  Abdomen:  Abdomen is obese without surgical scars.  Abdomen is soft and nontender.  There is no tympany.  Inguinal and umbilical hernia are not appreciated.  Musculoskeletal:  Patient does not have significant CVA tenderness in the  flank with palpation or percussion.  They full range of motion in all 4 extremities.  Strength in all 4 extremities appears congruent.  Patient is able to ambulate without assistance or difficulty.  Dermatologic:  Patient has no skin abnormalities or rashes.      LABS:     CBC:   Lab Results   Component Value Date    WBC 6.40 2024    HGB 14.8 2024    HCT 46.3 2024    MCV 98 2024     2024       BMP:   Lab Results   Component Value Date    CALCIUM 9.5 2024    K 4.1 2024    CO2 23 2024     2024    BUN 16 2024    CREATININE 1.02 2024       PSA from May 17, 2016 demonstrates a total PSA of 16.8   PSA from 18 19.3  Lab Results   Component Value Date    PSA 22.5 (H) 2023    PSA 23.8 (H) 2022    PSA 22.1 (H) 2021     IMAGIN24  Narrative & Impression   MULTIPARAMETRIC MRI OF THE PROSTATE WITH AND WITHOUT CONTRAST-WITH 3-D POSTPROCESSING     INDICATION: N40.1: Benign prostatic hyperplasia with lower urinary tract symptoms.     COMPARISON: MR prostate 2022.     PSA LEVEL: 22.5 ng/mL on 2023.  PRIOR BIOPSY DATE: 2009.  BIOPSY RESULTS: Benign.     TECHNIQUE: The following pulse sequences were obtained: Small field-of-view axial T1-weighted and multiplanar T2-weighted images; DWI axial and ADC map; large field of view axial T2 weighted images; T1w in-phase and opposed-phase axials of entire pelvis   and dynamic 3D T1w axial before and during IV contrast injection.     CONTRAST: Gadobutrol (Gadavist) 15 mL of  Gadobutrol injection (SINGLE-DOSE)     TECHNICAL LIMITATIONS: None.     FINDINGS:     PROSTATE:  Size: 6.9 x 6.3 x 7.4 cm = 142.3 cc.  Post-biopsy hemorrhage: None.  Central gland enlargement (BPH): Marked.     Focal lesions - No dominant lesion.  Findings of BPH with a mostly encapsulated OR a homogenous circumscribed nodule without encapsulation OR a homogenous hypointense area between nodules. PI-RADSv2.1 Category 2 - Low (clinically significant cancer   unlikely).        SEMINAL VESICLES: Unremarkable     Note: Clinically significant cancer is defined on pathology/histology as [place] score greater than or equal to 7, and/or volume of greater than or equal to 0.5 mL, and/or extraprostatic extension.     URINARY BLADDER: Unremarkable.     LYMPH NODES: No pelvic lymphadenopathy.     BONES: No suspicious osseous lesion.           IMPRESSION:     1. PI-RADSv2.1 Category 2 - Low (clinically significant cancer is unlikely to be present).     2. No extraprostatic tumor, seminal vesicle invasion, pelvic lymphadenopathy, or pelvic osseous metastatic disease.     3. Marked BPH with calculated prostate volume of 142 cc.       12/4/18  MULTIPARAMETRIC MRI OF THE PROSTATE WITH AND WITHOUT CONTRAST      INDICATION:   R97.20: Elevated prostate specific antigen (PSA).     COMPARISON: None.     PSA LEVEL: 19.3 ng/ml  on 9/21/2018..  PRIOR BIOPSY DATE: 2009.  BIOPSY RESULTS: Negative.     TECHNIQUE: The following pulse sequences were obtained on a 1.5 T scanner:  T1w axial; T2w axial, sagittal and coronal; DWI axial and ADC map; T1w water, fat, in-phase and opposed-phase axials of entire pelvis and dynamic 3D T1w axial before and during   IV contrast injection.     CONTRAST:  Gadobutrol (Gadavist) 17 mL of Gadobutrol injection (SINGLE-DOSE) ml.     TECHNICAL LIMITATIONS: None.        FINDINGS:     PROSTATE:     Size (AP x TRV x CC): 5.9 x 5.7 x 6.1 = 107 mL.     Post-biopsy hemorrhage:  None.    Central gland enlargement (BPH):  Marked.     Focal lesions - No dominant lesion. Heterogeneous peripheral zone. PI-RADSv2 Category 2 - Low (clinically significant cancer unlikely).     Note: Clinically significant cancer is defined on pathology/histology as [place] score greater than or equal to 7, and/or volume of greater than or equal to 0.5 mL, and/or extraprostatic extension.     Seminal vesicles: Unremarkable.     URINARY BLADDER: Mildly thickened wall, likely a sequela of chronic bladder outlet obstruction.     LYMPH NODES: No pelvic lymphadenopathy.     BONES: No suspicious osseous lesion.        IMPRESSION:     1. PI-RADSv2 Category 2 - Low (clinically significant cancer is unlikely to be present).     2. No extraprostatic tumor, seminal vesicle invasion, pelvic lymphadenopathy, or pelvic osseous metastatic disease.     3. Marked BPH with calculated prostate volume of 107 mL.    PATHOLOGY:     3/22/24  Final Diagnosis   A.  Prostate, right anterior medial, biopsy:     - No significant pathologic abnormalities.     - No high grade prostatic intraepithelial neoplasia (HGPIN) or carcinoma identified.     B.  Prostate, right anterior lateral, biopsy:     - No significant pathologic abnormalities.     - No high grade prostatic intraepithelial neoplasia (HGPIN) or carcinoma identified.     C.  Prostate, right posterior lateral, biopsy:     - No significant pathologic abnormalities.     - No high grade prostatic intraepithelial neoplasia (HGPIN) or carcinoma identified.     Comment:  Results of multiplex immunostaining (, p63, P504S) and pankeratin (AE1/AE3) help   support the above diagnosis.     D.  Prostate, left anterior medial, biopsy:     - No significant pathologic abnormalities.     - No high grade prostatic intraepithelial neoplasia (HGPIN) or carcinoma identified.     E.  Prostate, left anterior lateral, biopsy:     - No significant pathologic abnormalities.     - No high grade prostatic intraepithelial neoplasia (HGPIN) or  carcinoma identified.     F.  Prostate, left posterior lateral, biopsy:     - No significant pathologic abnormalities.     - No high grade prostatic intraepithelial neoplasia (HGPIN) or carcinoma identified.     G.  Prostate, left posterior medial, biopsy:     - No significant pathologic abnormalities.     - No high grade prostatic intraepithelial neoplasia (HGPIN) or carcinoma identified.     H.  Prostate, left posterior base, biopsy:     - No significant pathologic abnormalities.     - No high grade prostatic intraepithelial neoplasia (HGPIN) or carcinoma identified.     I.  Prostate, right posterior medial, biopsy:     - No significant pathologic abnormalities.     - No high grade prostatic intraepithelial neoplasia (HGPIN) or carcinoma identified.     J.  Prostate, right posterior base, biopsy:     - No significant pathologic abnormalities.     - No high grade prostatic intraepithelial neoplasia (HGPIN) or carcinoma identified.     K.  Prostate, left transitional zone, biopsy:     - Prostate tissue with small focus of atypical glands, suspicious for low-grade adenocarcinoma. See note.      L.  Prostate, right transitional zone, biopsy:     - No significant pathologic abnormalities.     - No high grade prostatic intraepithelial neoplasia (HGPIN) or carcinoma identified.     2/2017 - 138grams on TRUS  Prostate pathology from biopsy - negative      12/2009

## 2024-04-02 ENCOUNTER — OFFICE VISIT (OUTPATIENT)
Dept: UROLOGY | Facility: CLINIC | Age: 64
End: 2024-04-02
Payer: COMMERCIAL

## 2024-04-02 VITALS
BODY MASS INDEX: 42.66 KG/M2 | DIASTOLIC BLOOD PRESSURE: 84 MMHG | SYSTOLIC BLOOD PRESSURE: 132 MMHG | HEIGHT: 72 IN | OXYGEN SATURATION: 98 % | HEART RATE: 73 BPM | WEIGHT: 315 LBS

## 2024-04-02 DIAGNOSIS — R97.20 ELEVATED PSA: Primary | ICD-10-CM

## 2024-04-02 DIAGNOSIS — N42.32 ATYPICAL SMALL ACINAR PROLIFERATION OF PROSTATE: ICD-10-CM

## 2024-04-02 PROCEDURE — 99214 OFFICE O/P EST MOD 30 MIN: CPT | Performed by: UROLOGY

## 2024-04-02 NOTE — PATIENT INSTRUCTIONS
Surgical options for large prostates include:    1. Simple prostatectomy performed in either open or robotic fashion  2. Holmium laser enucleation of the prostate or HoLEP performed transurethral ([facility]-)  3. Alogrithm-driven aqua ablation (referral to [place])     Non surgical option is prostate artery embolization

## 2024-08-01 NOTE — PROGRESS NOTES
Detail Level: Detailed Discussion/Summary  Discussion Summary:   I reviewed the patient's pathology report with him and provided him and his wife a copy for their records  At this point I discussed with the patient that he has a markedly enlarged prostate of 140 g  He is currently very well-controlled on Proscar and Flomax and I recommended continuing these medications indefinitely  We discussed the side effect profile of both medications as well as the black box warning on Proscar which the patient has been on in the past     I did mention to him that potentially at some point he will fail medications  We briefly discussed surgical options for removal of his prostate including urolith, transrectal resection of his prostate, and simple prostatectomy  In my mind he is neither a candidate for urolith or transurethral resection of the prostate given the advanced size of his gland and would need to undergo a simple prostatectomy  He, the patient is well-controlled on medications  I'll see him back in 6 months with an updated PSA and we will continue to follow his symptoms  Chief Complaint  Chief Complaint Free Text Note Form: Pt here to discuss path results      History of Present Illness  HPI: 54-year-old male with a history of enlarged prostate and elevated PSA  Previously seen by my partner, Dr Felipe Duenas  Last seen in 2011  Patient was on Proscar for lower urinary tract symptoms  He had previously undergone a prostate biopsy in 2009 which was negative for cancer  Patient denies prostate cancer history in his family  His brother has undergone a transrectal resection of prostate for obstructive urinary symptoms  Patient is not been seen for the last 4 years  He presented over the holiday season with acute urinary retention  He was transferred from an outside hospital due to difficult placing a Fowler catheter the emergency room  Catheter was able to be placed at [address]  800 mL plus was returned   The patient was Detail Level: Zone started on Flomax  Evaluation for his gross hematuria, patient was also started on Proscar  Hematuria workup was undertaken  The patient recently underwent a cystoscopy and a transrectal biopsy the prostate  He was noted to have a markedly enlarged prostate at 138 g  He did well after the prostate biopsy and had minimal issues or side effects  Dense with his wife today and hasn't reported that his symptoms of urinary bother her markedly improved on his stool therapy  Review of Systems  ROS Reviewed:   ROS reviewed  Active Problems    1  Acute urinary retention (788 29) (R33 8)   2  Arthritis (716 90) (M19 90)   3  Benign prostatic hypertrophy with lower urinary tract symptoms (LUTS) (600 01) (N40 1)   4  Elevated prostate specific antigen (PSA) (790 93) (R97 20)   5  Gross hematuria (599 71) (R31 0)   6  Herniated cervical disc without myelopathy (722 0) (M50 20)   7  Lichen sclerosus (752 3) (L90 0)   8  Uncomplicated asthma, unspecified asthma severity (493 90) (J45 909)    Past Medical History  Active Problems And Past Medical History Reviewed: The active problems and past medical history were reviewed and updated today  Surgical History    1  History of Biopsy Lung Percutaneous   2  History of Diagnostic Cystoscopy   3  History of Needle Biopsy Of Prostate  Surgical History Reviewed: The surgical history was reviewed and updated today  Family History  Family History Reviewed: The family history was reviewed and updated today  Social History    · Alcohol use (V49 89) (Z78 9)   ·    · Never A Smoker  Social History Reviewed: The social history was reviewed and updated today  Current Meds   1  Advair Diskus 100-50 MCG/DOSE Inhalation Aerosol Powder Breath Activated; hs   Recorded   2  Clotrimazole-Betamethasone 1-0 05 % External Cream; APPLY  AND RUB  IN A THIN   FILM TO AFFECTED AREAS TWICE DAILY  (AM AND PM);    Therapy: 34BLP1213 to (Last XI:51MNU1310) Requested for: 99CHL6864 Ordered   3  Dendracin Neurodendraxcin LOTN Recorded   4  Finasteride 5 MG Oral Tablet; TAKE 1 TABLET DAILY; Therapy: 21RGC4353 to (Evaluate:00Tvu0978)  Requested for: 59TZK5539; Last   Rx:09Jan2017 Ordered   5  Flexeril 10 MG TABS; Therapy: (Recorded:10Mar2017) to Recorded   6  Gabapentin 300 MG Oral Capsule Recorded   7  Lisinopril 40 MG Oral Tablet Recorded   8  Meloxicam 7 5 MG Oral Tablet Recorded   9  Nasonex 50 MCG/ACT Nasal Suspension; hs Recorded   10  Proventil HFA AERS Recorded   11  RaNITidine HCl - 75 MG Oral Tablet; BID Recorded   12  Singulair 10 MG Oral Tablet Recorded   13  Tamsulosin HCl - 0 4 MG Oral Capsule; Take 1 capsule twice daily; Therapy: 05GRH0216 to (Last CC:40SPO6862)  Requested for: 31MHZ0623 Ordered   14  Zolpidem Tartrate 5 MG Oral Tablet Recorded  Medication List Reviewed: The medication list was reviewed and updated today  Allergies    1  Percocet TABS    Vitals  Vital Signs    Recorded: 03RYE5371 11:36AM   Heart Rate 80   Systolic 700   Diastolic 82   Weight 751 lb    BMI Calculated 40 64   BSA Calculated 2 59     Physical Exam    Constitutional   General appearance: Abnormal   Obese  Pulmonary   Respiratory effort: No increased work of breathing or signs of respiratory distress  Cardiovascular   Examination of extremities for edema and/or varicosities: Normal     Abdomen   Abdomen: Non-tender, no masses  Musculoskeletal   Gait and station: Normal     Skin   Skin and subcutaneous tissue: Normal without rashes or lesions  Results/Data  Lab Studies Reviewed: Prostate biopsy demonstrates benign tissue with no cancer CT ABDOMEN PELVIS W WO CONTRAST 20FYO4235 03:15PM Rodo Carson Order Number: [id number]   Performing Comments: CT of the abdomen and pelvis with and without IV contrast followed by delayed images  No oral contrast needed  Urogram/hematuria protocol    - Patient Instructions:  To schedule this appointment, please contact Central Scheduling at    [phone]  **  [address] **                 NOTHING TO EAT 3 HOURS PRIOR TO TEST                 CLEAR LIQUIDS ARE OK     Test Name Result Flag Reference   CT ABDOMEN PELVIS W WO CONTRAST (Report)     CT ABDOMEN AND PELVIS WITH AND WITHOUT IV CONTRAST     INDICATION: Urinary retention  COMPARISON: None  TECHNIQUE: CT examination of the abdomen and pelvis  Precontrast imaging through the upper abdomen was performed  In addition to portal venous phase postcontrast scanning through the abdomen and pelvis, delayed phase postcontrast scanning was    performed through the abdomen from pelvis for performance of a urogram  Axial, sagittal and coronal reformatted projections were created  This examination, like all CT scans performed in the [facility], was performed utilizing    techniques to minimize radiation dose exposure, including the use of iterative reconstruction and automated exposure control  IV Contrast: iohexol (OMNIPAQUE) 350 MG/ML injection (MULTI-DOSE) 100 mL Note: (SINGLE DOSE/MULTI DOSE) information refers to the container from which the contrast was acquired  Contrast was injected one time intravenously without immediate    complication  Enteric Contrast: Enteric contrast was not administered  FINDINGS:     ABDOMEN     LOWER CHEST: No significant abnormalities identified in the lower chest      LIVER/BILIARY TREE: Unremarkable  GALLBLADDER: No calcified gallstones  No pericholecystic inflammatory change  SPLEEN: Unremarkable  PANCREAS: Unremarkable  ADRENAL GLANDS: Unremarkable  KIDNEYS/URETERS: One or more sharply circumscribed subcentimeter renal hypodensities are noted  These lesions are too small to accurately characterize, but are statistically most likely to represent benign cortical renal cyst(s)   According to the    guidelines published in the [facility] Paper of the ACR Incidental Findings Committee (Radiology 2010), no further workup of these lesions is recommended  No hydronephrosis, perinephric collections, or suspicious enhancing solid mass lesions  STOMACH AND BOWEL: Unremarkable  APPENDIX: A normal appendix was visualized  ABDOMINOPELVIC CAVITY: No ascites or free intraperitoneal air  No lymphadenopathy  VESSELS: Unremarkable for patient's age  PELVIS     REPRODUCTIVE ORGANS: The prostate is enlarged  URINARY BLADDER: Mild bladder wall thickening likely sequela of outlet obstruction with no enhancing mass or pericystic inflammation  ABDOMINAL WALL/INGUINAL REGIONS: Unremarkable  OSSEOUS STRUCTURES: No acute fracture or destructive osseous lesion  IMPRESSION:       1  Prostatomegaly with mild bladder wall thickening concerning for obstruction  2  No hydronephrosis or perinephric collections         Workstation performed: [id number]     Signed by:   Marcelo Parra MD   1/17/17     Signatures   Electronically signed by : TYLER Orellana ; Mar 10 2017 11:58AM EST                       (Author) Detail Level: Generalized

## 2024-08-22 DIAGNOSIS — N40.1 BENIGN PROSTATIC HYPERPLASIA WITH LOWER URINARY TRACT SYMPTOMS, SYMPTOM DETAILS UNSPECIFIED: ICD-10-CM

## 2024-08-22 RX ORDER — TAMSULOSIN HYDROCHLORIDE 0.4 MG/1
0.4 CAPSULE ORAL 2 TIMES DAILY
Qty: 180 CAPSULE | Refills: 1 | Status: SHIPPED | OUTPATIENT
Start: 2024-08-22

## 2024-10-02 ENCOUNTER — APPOINTMENT (OUTPATIENT)
Dept: LAB | Facility: MEDICAL CENTER | Age: 64
End: 2024-10-02
Payer: COMMERCIAL

## 2024-10-02 DIAGNOSIS — D50.9 IRON DEFICIENCY ANEMIA, UNSPECIFIED IRON DEFICIENCY ANEMIA TYPE: ICD-10-CM

## 2024-10-02 DIAGNOSIS — I10 ESSENTIAL HYPERTENSION, MALIGNANT: ICD-10-CM

## 2024-10-02 DIAGNOSIS — R97.20 ELEVATED PSA: ICD-10-CM

## 2024-10-02 DIAGNOSIS — N42.32 ATYPICAL SMALL ACINAR PROLIFERATION OF PROSTATE: ICD-10-CM

## 2024-10-02 DIAGNOSIS — E78.00 PURE HYPERCHOLESTEROLEMIA: Primary | ICD-10-CM

## 2024-10-02 DIAGNOSIS — R73.9 BLOOD GLUCOSE ELEVATED: ICD-10-CM

## 2024-10-02 LAB
ALBUMIN SERPL BCG-MCNC: 4.3 G/DL (ref 3.5–5)
ALP SERPL-CCNC: 50 U/L (ref 34–104)
ALT SERPL W P-5'-P-CCNC: 17 U/L (ref 7–52)
ANION GAP SERPL CALCULATED.3IONS-SCNC: 9 MMOL/L (ref 4–13)
AST SERPL W P-5'-P-CCNC: 20 U/L (ref 13–39)
BASOPHILS # BLD AUTO: 0.06 THOUSANDS/ΜL (ref 0–0.1)
BASOPHILS NFR BLD AUTO: 1 % (ref 0–1)
BILIRUB SERPL-MCNC: 0.68 MG/DL (ref 0.2–1)
BUN SERPL-MCNC: 16 MG/DL (ref 5–25)
CALCIUM SERPL-MCNC: 9 MG/DL (ref 8.4–10.2)
CHLORIDE SERPL-SCNC: 100 MMOL/L (ref 96–108)
CHOLEST SERPL-MCNC: 183 MG/DL
CO2 SERPL-SCNC: 28 MMOL/L (ref 21–32)
CREAT SERPL-MCNC: 0.91 MG/DL (ref 0.6–1.3)
EOSINOPHIL # BLD AUTO: 0.16 THOUSAND/ΜL (ref 0–0.61)
EOSINOPHIL NFR BLD AUTO: 3 % (ref 0–6)
ERYTHROCYTE [DISTWIDTH] IN BLOOD BY AUTOMATED COUNT: 13 % (ref 11.6–15.1)
EST. AVERAGE GLUCOSE BLD GHB EST-MCNC: 123 MG/DL
GFR SERPL CREATININE-BSD FRML MDRD: 88 ML/MIN/1.73SQ M
GLUCOSE P FAST SERPL-MCNC: 105 MG/DL (ref 65–99)
HBA1C MFR BLD: 5.9 %
HCT VFR BLD AUTO: 43 % (ref 36.5–49.3)
HDLC SERPL-MCNC: 30 MG/DL
HGB BLD-MCNC: 14 G/DL (ref 12–17)
IMM GRANULOCYTES # BLD AUTO: 0.03 THOUSAND/UL (ref 0–0.2)
IMM GRANULOCYTES NFR BLD AUTO: 1 % (ref 0–2)
LDLC SERPL CALC-MCNC: 111 MG/DL (ref 0–100)
LDLC SERPL DIRECT ASSAY-MCNC: 116 MG/DL (ref 0–100)
LYMPHOCYTES # BLD AUTO: 1.99 THOUSANDS/ΜL (ref 0.6–4.47)
LYMPHOCYTES NFR BLD AUTO: 33 % (ref 14–44)
MCH RBC QN AUTO: 31.5 PG (ref 26.8–34.3)
MCHC RBC AUTO-ENTMCNC: 32.6 G/DL (ref 31.4–37.4)
MCV RBC AUTO: 97 FL (ref 82–98)
MONOCYTES # BLD AUTO: 0.67 THOUSAND/ΜL (ref 0.17–1.22)
MONOCYTES NFR BLD AUTO: 11 % (ref 4–12)
NEUTROPHILS # BLD AUTO: 3.19 THOUSANDS/ΜL (ref 1.85–7.62)
NEUTS SEG NFR BLD AUTO: 51 % (ref 43–75)
NONHDLC SERPL-MCNC: 153 MG/DL
NRBC BLD AUTO-RTO: 0 /100 WBCS
PLATELET # BLD AUTO: 182 THOUSANDS/UL (ref 149–390)
PMV BLD AUTO: 10.4 FL (ref 8.9–12.7)
POTASSIUM SERPL-SCNC: 3.9 MMOL/L (ref 3.5–5.3)
PROT SERPL-MCNC: 7.4 G/DL (ref 6.4–8.4)
PSA SERPL-MCNC: 30.31 NG/ML (ref 0–4)
RBC # BLD AUTO: 4.45 MILLION/UL (ref 3.88–5.62)
SODIUM SERPL-SCNC: 137 MMOL/L (ref 135–147)
TRIGL SERPL-MCNC: 209 MG/DL
WBC # BLD AUTO: 6.1 THOUSAND/UL (ref 4.31–10.16)

## 2024-10-02 PROCEDURE — 85025 COMPLETE CBC W/AUTO DIFF WBC: CPT

## 2024-10-02 PROCEDURE — 84153 ASSAY OF PSA TOTAL: CPT

## 2024-10-02 PROCEDURE — 83721 ASSAY OF BLOOD LIPOPROTEIN: CPT

## 2024-10-02 PROCEDURE — 36415 COLL VENOUS BLD VENIPUNCTURE: CPT

## 2024-10-02 PROCEDURE — 80053 COMPREHEN METABOLIC PANEL: CPT

## 2024-10-02 PROCEDURE — 80061 LIPID PANEL: CPT

## 2024-10-02 PROCEDURE — 83036 HEMOGLOBIN GLYCOSYLATED A1C: CPT

## 2024-10-08 ENCOUNTER — OFFICE VISIT (OUTPATIENT)
Dept: UROLOGY | Facility: CLINIC | Age: 64
End: 2024-10-08
Payer: COMMERCIAL

## 2024-10-08 VITALS
OXYGEN SATURATION: 99 % | DIASTOLIC BLOOD PRESSURE: 80 MMHG | HEIGHT: 72 IN | BODY MASS INDEX: 42.66 KG/M2 | HEART RATE: 77 BPM | SYSTOLIC BLOOD PRESSURE: 130 MMHG | WEIGHT: 315 LBS

## 2024-10-08 DIAGNOSIS — R97.20 ELEVATED PSA: Primary | ICD-10-CM

## 2024-10-08 DIAGNOSIS — N47.7 POSTHITIS: ICD-10-CM

## 2024-10-08 PROCEDURE — 99213 OFFICE O/P EST LOW 20 MIN: CPT | Performed by: PHYSICIAN ASSISTANT

## 2024-10-08 RX ORDER — CLOTRIMAZOLE AND BETAMETHASONE DIPROPIONATE 10; .64 MG/G; MG/G
CREAM TOPICAL 2 TIMES DAILY
Qty: 45 G | Refills: 1 | Status: SHIPPED | OUTPATIENT
Start: 2024-10-08

## 2024-10-08 NOTE — PROGRESS NOTES
Ambulatory Visit  Name: Gene Zapata      : 1960      MRN: [id number]  Encounter Provider: Gilma Chinchilla PA-C  Encounter Date: 10/8/2024   Encounter department: [facility]    Assessment & Plan  Elevated PSA  Negative prostate biopsy , , ; no malignancy  MRI prostate , ,  large gland bph no lesion pirads 2  another acute jump in the psa to 30 this month  i have low suspicion of malignancy based on extensive workup prior, even within the year  urine culture to assess any chronic bacteruria, though no symptoms of prostatitis or uti  psa again in 3 months and annual  Orders:    PSA Total, Diagnostic; Future    Urine culture    Posthitis    Orders:    clotrimazole-betamethasone (LOTRISONE) 1-0.05 % cream; Apply topically 2 (two) times a day      History of Present Illness     Gene Zapata is a 64 y.o. male who presents for elevated psa followup for 20 yrs. large gland bph 130g+ negative trus prostate biopsy , , and transperineal ). Symptomatic BPH now on dual therapy with significant improvement in voiding. No dysuria hematuria or rectal pain. no h/o prostatitis. has some chapping of the tip of the penis/buried.    History obtained from : patient  Review of Systems   Constitutional: Negative.    Respiratory: Negative.     Cardiovascular: Negative.    Genitourinary:  Negative for decreased urine volume, difficulty urinating, dysuria, flank pain, frequency, hematuria and urgency.   Musculoskeletal: Negative.            AUA SYMPTOM SCORE      Flowsheet Row Most Recent Value   AUA SYMPTOM SCORE    How often have you had a sensation of not emptying your bladder completely after you finished urinating? 1 (P)     How often have you had to urinate again less than two hours after you finished urinating? 1 (P)     How often have you found you stopped and started again several times when you urinate? 1 (P)     How often have you found it difficult to  postpone urination? 1 (P)     How often have you had a weak urinary stream? 1 (P)     How often have you had to push or strain to begin urination? 1 (P)     How many times did you most typically get up to urinate from the time you went to bed at night until the time you got up in the morning? 1 (P)     Quality of Life: If you were to spend the rest of your life with your urinary condition just the way it is now, how would you feel about that? 1 (P)     AUA SYMPTOM SCORE 7 (P)            Objective     /80 (BP Location: Right arm, Patient Position: Sitting, Cuff Size: Adult)   Pulse 77   Ht 6' (1.829 m)   Wt (!) 143 kg (316 lb)   SpO2 99%   BMI 42.86 kg/m²   Physical Exam  Vitals and nursing note reviewed.   Constitutional:       General: He is not in acute distress.     Appearance: He is well-developed. He is not diaphoretic.   HENT:      Head: Normocephalic and atraumatic.   Pulmonary:      Effort: Pulmonary effort is normal.   Genitourinary:     Comments: circumcised buried penis with thickened chapped adipose skin surrounding the phallus. Testes smooth descended bilaterally into the scrotum nontender with no palpable mass.  Digital rectal exam smooth prostate, without appreciable nodule, induration or asymmetry  Musculoskeletal:         General: No edema. Normal range of motion.   Skin:     General: Skin is warm.   Neurological:      Mental Status: He is alert and oriented to person, place, and time.   Psychiatric:         Mood and Affect: Mood and affect normal.       Results  Lab Results   Component Value Date    PSA 30.307 (H) 10/02/2024    PSA 22.5 (H) 12/09/2023    PSA 23.8 (H) 09/07/2022     Lab Results   Component Value Date    CALCIUM 9.0 10/02/2024    K 3.9 10/02/2024    CO2 28 10/02/2024     10/02/2024    BUN 16 10/02/2024    CREATININE 0.91 10/02/2024     Lab Results   Component Value Date    WBC 6.10 10/02/2024    HGB 14.0 10/02/2024    HCT 43.0 10/02/2024    MCV 97 10/02/2024    PLT  182 10/02/2024       Office Urine Dip  No results found for this or any previous visit (from the past 1 hour(s)).]    Administrative Statements

## 2024-10-08 NOTE — ASSESSMENT & PLAN NOTE
Negative prostate biopsy 2009, 2017, 2024; no malignancy  MRI prostate 2018, 2022, 2024 large gland bph no lesion pirads 2  another acute jump in the psa to 30 this month  i have low suspicion of malignancy based on extensive workup prior, even within the year  urine culture to assess any chronic bacteruria, though no symptoms of prostatitis or uti  psa again in 3 months and annual  Orders:    PSA Total, Diagnostic; Future    Urine culture

## 2024-10-09 PROCEDURE — 87086 URINE CULTURE/COLONY COUNT: CPT | Performed by: PHYSICIAN ASSISTANT

## 2024-10-10 LAB — BACTERIA UR CULT: NORMAL

## 2024-12-31 DIAGNOSIS — N40.1 BENIGN PROSTATIC HYPERPLASIA WITH LOWER URINARY TRACT SYMPTOMS, SYMPTOM DETAILS UNSPECIFIED: ICD-10-CM

## 2024-12-31 RX ORDER — FINASTERIDE 5 MG/1
5 TABLET, FILM COATED ORAL DAILY
Qty: 90 TABLET | Refills: 1 | Status: SHIPPED | OUTPATIENT
Start: 2024-12-31

## 2025-01-02 ENCOUNTER — TELEPHONE (OUTPATIENT)
Age: 65
End: 2025-01-02

## 2025-01-02 NOTE — TELEPHONE ENCOUNTER
Pt calling due to he was seen in [facility] ED on 12/30/24 for urinary retention. Pt was discharged with arriola and set up appt for arriola removal with [facility] on 1/16/25.     Pt calling to schedule TOV with SL due to he is a current pt and no appts available until 1/20.     Pt sts he will keep appt with [facility] at this time.

## 2025-02-13 ENCOUNTER — APPOINTMENT (OUTPATIENT)
Dept: LAB | Facility: CLINIC | Age: 65
End: 2025-02-13
Payer: COMMERCIAL

## 2025-02-13 DIAGNOSIS — E78.00 PURE HYPERCHOLESTEROLEMIA: ICD-10-CM

## 2025-02-13 DIAGNOSIS — I15.9 SECONDARY HYPERTENSION: ICD-10-CM

## 2025-02-13 DIAGNOSIS — R97.20 ELEVATED PSA: ICD-10-CM

## 2025-02-13 LAB
ALBUMIN SERPL BCG-MCNC: 4 G/DL (ref 3.5–5)
ALP SERPL-CCNC: 49 U/L (ref 34–104)
ALT SERPL W P-5'-P-CCNC: 16 U/L (ref 7–52)
ANION GAP SERPL CALCULATED.3IONS-SCNC: 7 MMOL/L (ref 4–13)
AST SERPL W P-5'-P-CCNC: 15 U/L (ref 13–39)
BILIRUB SERPL-MCNC: 0.63 MG/DL (ref 0.2–1)
BUN SERPL-MCNC: 15 MG/DL (ref 5–25)
CALCIUM SERPL-MCNC: 9.3 MG/DL (ref 8.4–10.2)
CHLORIDE SERPL-SCNC: 102 MMOL/L (ref 96–108)
CHOLEST SERPL-MCNC: 238 MG/DL (ref ?–200)
CO2 SERPL-SCNC: 29 MMOL/L (ref 21–32)
CREAT SERPL-MCNC: 0.88 MG/DL (ref 0.6–1.3)
GFR SERPL CREATININE-BSD FRML MDRD: 90 ML/MIN/1.73SQ M
GLUCOSE P FAST SERPL-MCNC: 111 MG/DL (ref 65–99)
HDLC SERPL-MCNC: 35 MG/DL
LDLC SERPL CALC-MCNC: 149 MG/DL (ref 0–100)
LDLC SERPL DIRECT ASSAY-MCNC: 159 MG/DL (ref 0–100)
NONHDLC SERPL-MCNC: 203 MG/DL
POTASSIUM SERPL-SCNC: 3.8 MMOL/L (ref 3.5–5.3)
PROT SERPL-MCNC: 7.3 G/DL (ref 6.4–8.4)
PSA SERPL-MCNC: 46.79 NG/ML (ref 0–4)
SODIUM SERPL-SCNC: 138 MMOL/L (ref 135–147)
TRIGL SERPL-MCNC: 271 MG/DL (ref ?–150)

## 2025-02-13 PROCEDURE — 80053 COMPREHEN METABOLIC PANEL: CPT

## 2025-02-13 PROCEDURE — 83721 ASSAY OF BLOOD LIPOPROTEIN: CPT

## 2025-02-13 PROCEDURE — 80061 LIPID PANEL: CPT

## 2025-02-13 PROCEDURE — 36415 COLL VENOUS BLD VENIPUNCTURE: CPT

## 2025-02-13 PROCEDURE — 84153 ASSAY OF PSA TOTAL: CPT

## 2025-02-18 ENCOUNTER — RESULTS FOLLOW-UP (OUTPATIENT)
Dept: UROLOGY | Facility: CLINIC | Age: 65
End: 2025-02-18

## 2025-02-19 ENCOUNTER — TELEPHONE (OUTPATIENT)
Dept: UROLOGY | Facility: CLINIC | Age: 65
End: 2025-02-19

## 2025-02-19 NOTE — TELEPHONE ENCOUNTER
Gilma Chinchilla PA-C to [facility]        2/19/25 12:36 PM  Result Note  I called patient to review his psa results (very high 46, fortunately chronic problem for him and 3 negative biopsies is reassuring, but he has large BPH and recurrent retention i would lean more toward surgical management at this point) and also discuss his recent retention episode. he was seen by [facility] and appears he had voiding trial already last month. i wanted to see what his followup plan was. dr manuel has agreed to take him on closer to home at the [place] office as well. Please let me know when he returns the call

## 2025-02-23 DIAGNOSIS — N40.1 BENIGN PROSTATIC HYPERPLASIA WITH LOWER URINARY TRACT SYMPTOMS, SYMPTOM DETAILS UNSPECIFIED: ICD-10-CM

## 2025-02-25 RX ORDER — TAMSULOSIN HYDROCHLORIDE 0.4 MG/1
0.4 CAPSULE ORAL 2 TIMES DAILY
Qty: 180 CAPSULE | Refills: 1 | Status: SHIPPED | OUTPATIENT
Start: 2025-02-25

## 2025-03-10 NOTE — TELEPHONE ENCOUNTER
Called and spoke with pt he is staying with Gilma and would like to see her moving forward. Pt did knot he had a case of retention but again would like to adress that with gilma

## 2025-03-10 NOTE — TELEPHONE ENCOUNTER
----- Message from Gilma Chinchilla PA-C sent at 3/10/2025  3:10 PM EDT -----  Please reach out to patient. epic shows he has upcoming appt with [facility]ist closer to home on 3/25. If he would like we can send his labwork results but I will not send it without his permission thanks.

## 2025-03-12 ENCOUNTER — TELEPHONE (OUTPATIENT)
Dept: UROLOGY | Facility: CLINIC | Age: 65
End: 2025-03-12

## 2025-03-12 NOTE — TELEPHONE ENCOUNTER
Voicemail message left for the patient asking to call the office to discuss Gilam's recommendations.  Gilma referring the patient to Dr Weber to the [place] office.

## 2025-03-12 NOTE — TELEPHONE ENCOUNTER
Gilma Chinchilla PA-C to [facility]        3/10/25  4:08 PM  Result Note  thanks karolina- i saw in [facility] that he was having that again. i made arrangements to have him see dr manuel- see message below he would like to see him in [place]. win-win closer for patient and he's getting BPH specialist that i already gave the background history to    3/10/25  3:23 PM  Karolina Dickey MA routed this conversation to [facility]   Karolina Dickey MA       3/10/25  3:23 PM  Note     Called and spoke with pt he is staying with Gilma and would like to see her moving forward. Pt did knot he had a case of retention but again would like to adress that with gilma      View older events    Karolina Dickey MA       3/10/25  3:22 PM  Note     ----- Message from Gilma Chinchilla PA-C sent at 3/10/2025  3:10 PM EDT -----  Please reach out to patient. epic shows he has upcoming appt with [facility]ist closer to home on 3/25. If he would like we can send his labwork results but I will not send it without his permission thanks.            Gilma Chinchilla PA-C to [facility]        3/10/25  3:10 PM  Result Note  Please reach out to patient. epic shows he has upcoming appt with [facility] urologist closer to home on 3/25. If he would like we can send his labwork results but I will not send it without his permission thanks.  Gilma Chinchilla PA-C to [facility]        2/19/25 12:36 PM  Result Note  I called patient to review his psa results (very high 46, fortunately chronic problem for him and 3 negative biopsies is reassuring, but he has large BPH and recurrent retention i would lean more toward surgical management at this point) and also discuss his recent retention episode. he was seen by [facility] and appears he had voiding trial already last month. i wanted to see what his followup plan was. dr manuel has agreed  to take him on closer to home at the [place] office as well. Please let me know when he returns the call  Torito Weber DO to LILA Reynolds LPN       2/18/25  7:37 AM   Yeah happy to see him    Anna can you get this lynsey to see me within the next 2 months or so?  Gilma Chinchilla PA-C to Torito Weber DO        2/18/25  3:41 AM  Result Note  [facility] Ugarte, I'd like to ask if you can see this patient in [place]. I inherited him from Dr Jade when he was seeing him in [facility]. Pt lives up in [place] [facility]. Anyway large gland 130g+ with few episodes of retention, three negative biopsies in past decade including transperineal 2024, and progressively rising psa. This most recent psa of 46 is shortly after arriola was removed. He is on finasteride. Maybe it is time for a HOLEP. let me know what you think and I will talk to Gene about going there. Thanks

## 2025-03-18 NOTE — TELEPHONE ENCOUNTER
Patient has been scheduled for a follow up appointment with Dr. Weber to discuss plan of care r/t retention and rising PSA on 5/21/25 11:15 am at the [place] office.

## 2025-03-19 NOTE — TELEPHONE ENCOUNTER
Called and left a Vm for the pt to please call back and confirm the appt made for him with  in the [place] office on 5/21/25 at 11:15am.    Provided him with a call back number to confirm the appt

## 2025-03-19 NOTE — TELEPHONE ENCOUNTER
Patient called today to ask why he was scheduled back in [place]? Patient wants to remain in  location. IF he were to transfer to another location he'd want it to be [place].    Pt asked to be rescheduled with MD in  and is scheduled with Dr Ray on 4/7 at 3:30 PM.

## 2025-04-07 ENCOUNTER — OFFICE VISIT (OUTPATIENT)
Dept: UROLOGY | Facility: CLINIC | Age: 65
End: 2025-04-07
Payer: MEDICARE

## 2025-04-07 VITALS
OXYGEN SATURATION: 97 % | BODY MASS INDEX: 42.53 KG/M2 | DIASTOLIC BLOOD PRESSURE: 80 MMHG | SYSTOLIC BLOOD PRESSURE: 138 MMHG | HEIGHT: 72 IN | HEART RATE: 72 BPM | WEIGHT: 314 LBS

## 2025-04-07 DIAGNOSIS — R97.20 ELEVATED PSA: Primary | ICD-10-CM

## 2025-04-07 LAB — POST-VOID RESIDUAL VOLUME, ML POC: 7 ML

## 2025-04-07 PROCEDURE — 99213 OFFICE O/P EST LOW 20 MIN: CPT | Performed by: UROLOGY

## 2025-04-07 PROCEDURE — 51798 US URINE CAPACITY MEASURE: CPT | Performed by: UROLOGY

## 2025-04-07 NOTE — PROGRESS NOTES
Name: Gene Zapata      : 1960      MRN: [id number]  Encounter Provider: Tone Ray MD  Encounter Date: 2025   Encounter department: [facility] [place] [facility]  :  Assessment & Plan  Elevated PSA  We discussed potential etiologies of PSA elevation.  He understands he has a very large prostate.  We discussed PSA density and appropriate levels as well as appropriate doubling time.  Unfortunately, despite multiple negative prostatitis, there is still concern based on his current PSA.  I certainly recommend another MRI of the prostate as it has been over a year since previous.  We will use the new MRI to determine risk of prostate cancer and potential need for MRI fusion biopsy.  We discussed technique, risk, benefits.  They agree with the plan and questions answered to their satisfaction.  Orders:  •  POCT Measure PVR  •  MRI prostate multiparametric wo w contrast; Future  •  Basic metabolic panel; Future        History of Present Illness   Gene Zapata is a 64 y.o. male who presents in follow-up.  He is a previous patient of Dr. Jade.  He has history of significantly large prostate at least 130 g.  He has had prostate biopsies in , ,  follow-up which were negative for malignancy.  He has had episodes of urinary retention, but says that he is voiding well without symptoms while taking tamsulosin.  Currently no complaints.  AUA symptom score 8.    PSA continued to rise however, up to 46.      AUA SYMPTOM SCORE      Flowsheet Row Most Recent Value   AUA SYMPTOM SCORE    How often have you had a sensation of not emptying your bladder completely after you finished urinating? 1 (P)     How often have you had to urinate again less than two hours after you finished urinating? 1 (P)     How often have you found you stopped and started again several times when you urinate? 1 (P)     How often have you found it difficult to postpone urination? 1 (P)     How often have you  had a weak urinary stream? 2 (P)     How often have you had to push or strain to begin urination? 1 (P)     How many times did you most typically get up to urinate from the time you went to bed at night until the time you got up in the morning? 1 (P)     Quality of Life: If you were to spend the rest of your life with your urinary condition just the way it is now, how would you feel about that? 2 (P)     AUA SYMPTOM SCORE 8 (P)            Review of Systems       Objective   /80 (BP Location: Right arm, Patient Position: Sitting, Cuff Size: Adult)   Pulse 72   Ht 6' (1.829 m)   Wt (!) 142 kg (314 lb)   SpO2 97%   BMI 42.59 kg/m²     Physical Exam      Results   Lab Results   Component Value Date    PSA 46.792 (H) 02/13/2025    PSA 30.307 (H) 10/02/2024    PSA 22.5 (H) 12/09/2023     Lab Results   Component Value Date    CALCIUM 9.3 02/13/2025    K 3.8 02/13/2025    CO2 29 02/13/2025     02/13/2025    BUN 15 02/13/2025    CREATININE 0.88 02/13/2025     Lab Results   Component Value Date    WBC 6.10 10/02/2024    HGB 14.0 10/02/2024    HCT 43.0 10/02/2024    MCV 97 10/02/2024     10/02/2024       Office Urine Dip  No results found for this or any previous visit (from the past hour).

## 2025-04-07 NOTE — ASSESSMENT & PLAN NOTE
We discussed potential etiologies of PSA elevation.  He understands he has a very large prostate.  We discussed PSA density and appropriate levels as well as appropriate doubling time.  Unfortunately, despite multiple negative prostatitis, there is still concern based on his current PSA.  I certainly recommend another MRI of the prostate as it has been over a year since previous.  We will use the new MRI to determine risk of prostate cancer and potential need for MRI fusion biopsy.  We discussed technique, risk, benefits.  They agree with the plan and questions answered to their satisfaction.  Orders:  •  POCT Measure PVR  •  MRI prostate multiparametric wo w contrast; Future  •  Basic metabolic panel; Future

## 2025-04-18 ENCOUNTER — HOSPITAL ENCOUNTER (OUTPATIENT)
Facility: MEDICAL CENTER | Age: 65
Discharge: HOME | End: 2025-04-18
Payer: MEDICARE

## 2025-04-18 DIAGNOSIS — R97.20 ELEVATED PSA: ICD-10-CM

## 2025-04-18 PROCEDURE — 76377 3D RENDER W/INTRP POSTPROCES: CPT

## 2025-04-18 PROCEDURE — 72197 MRI PELVIS W/O & W/DYE: CPT

## 2025-04-18 PROCEDURE — A9585 GADOBUTROL INJECTION: HCPCS | Performed by: UROLOGY

## 2025-04-18 RX ORDER — GADOBUTROL 604.72 MG/ML
13 INJECTION INTRAVENOUS
Status: COMPLETED | OUTPATIENT
Start: 2025-04-18 | End: 2025-04-18

## 2025-04-18 RX ADMIN — GADOBUTROL 13 ML: 604.72 INJECTION INTRAVENOUS at 12:11

## 2025-04-21 ENCOUNTER — RESULTS FOLLOW-UP (OUTPATIENT)
Dept: UROLOGY | Facility: AMBULATORY SURGERY CENTER | Age: 65
End: 2025-04-21

## 2025-04-21 NOTE — TELEPHONE ENCOUNTER
Please inform pt that MRI and PSA are low risk for prostate cancer, with large prostate volume.   6 month follow up is appropriate.

## 2025-04-22 DIAGNOSIS — R97.20 ELEVATED PSA: Primary | ICD-10-CM

## 2025-04-22 NOTE — TELEPHONE ENCOUNTER
Called and LMOM for patient with mpMRI prostate/PSA results and Dr. Ray's note. Advised patient to follow up as scheduled in October with another PSA prior to the visit.

## 2025-07-02 DIAGNOSIS — N40.1 BENIGN PROSTATIC HYPERPLASIA WITH LOWER URINARY TRACT SYMPTOMS, SYMPTOM DETAILS UNSPECIFIED: ICD-10-CM

## 2025-07-02 RX ORDER — FINASTERIDE 5 MG/1
5 TABLET, FILM COATED ORAL DAILY
Qty: 90 TABLET | Refills: 1 | Status: SHIPPED | OUTPATIENT
Start: 2025-07-02

## (undated) DEVICE — 3M™ IOBAN™ 2 ANTIMICROBIAL INCISE DRAPE 6650EZ: Brand: IOBAN™ 2

## (undated) DEVICE — 3M™ TRANSPORE™ WHITE SURGICAL TAPE 1534-1, 1 INCH X 10 YARD (2,5CM X 9,1M), 12 ROLLS/CARTON 10 CARTONS/CASE: Brand: 3M™ TRANSPORE™

## (undated) DEVICE — UTILITY MARKER,BLACK WITH LABELS: Brand: DEVON

## (undated) DEVICE — RAZOR STERILE

## (undated) DEVICE — MAX-CORE® DISPOSABLE CORE BIOPSY INSTRUMENT, 18G X 25CM: Brand: MAX-CORE

## (undated) DEVICE — ULTRASOUND GEL STERILE FOIL PK

## (undated) DEVICE — GLOVE SRG BIOGEL 8

## (undated) DEVICE — SCD SEQUENTIAL COMPRESSION COMFORT SLEEVE MEDIUM KNEE LENGTH: Brand: KENDALL SCD

## (undated) DEVICE — SPECIMEN CONTAINER STERILE PEEL PACK

## (undated) DEVICE — CHLORAPREP HI-LITE 26ML ORANGE

## (undated) DEVICE — PROBE ULTRASOUND URONAV TRIPLANE

## (undated) DEVICE — FORMALIN PRE-FILLED 10 PCT PROSTATE BIOPSY

## (undated) DEVICE — SPONGE 4 X 4 XRAY 16 PLY STRL LF RFD

## (undated) DEVICE — TELFA NON-ADHERENT ABSORBENT DRESSING: Brand: TELFA

## (undated) DEVICE — SYSTEM TRANSPERINEAL ACCESS PRECISIONPOINT

## (undated) DEVICE — SYRINGE 10ML LL

## (undated) DEVICE — LUBRICANT JELLY SURGILUBE TUBE 2OZ FLIP TOP

## (undated) DEVICE — NEEDLE 25G X 1 1/2

## (undated) DEVICE — PREP PAD BNS: Brand: CONVERTORS

## (undated) DEVICE — SYRINGE,TOOMEY,IRRIGATION,70CC,STERILE: Brand: MEDLINE